# Patient Record
Sex: FEMALE | Race: WHITE | NOT HISPANIC OR LATINO | Employment: FULL TIME | URBAN - METROPOLITAN AREA
[De-identification: names, ages, dates, MRNs, and addresses within clinical notes are randomized per-mention and may not be internally consistent; named-entity substitution may affect disease eponyms.]

---

## 2023-03-26 DIAGNOSIS — M62.838 OTHER MUSCLE SPASM: ICD-10-CM

## 2023-03-27 RX ORDER — CYCLOBENZAPRINE HCL 10 MG
TABLET ORAL
Qty: 90 TABLET | Refills: 3 | Status: SHIPPED | OUTPATIENT
Start: 2023-03-27 | End: 2023-07-29

## 2023-03-28 ENCOUNTER — TELEPHONE (OUTPATIENT)
Dept: PRIMARY CARE | Facility: CLINIC | Age: 63
End: 2023-03-28
Payer: COMMERCIAL

## 2023-03-28 DIAGNOSIS — G47.00 INSOMNIA, UNSPECIFIED TYPE: ICD-10-CM

## 2023-04-03 DIAGNOSIS — M54.81 OCCIPITAL NEURALGIA: ICD-10-CM

## 2023-04-03 RX ORDER — ZOLPIDEM TARTRATE 5 MG/1
5 TABLET ORAL NIGHTLY PRN
COMMUNITY
End: 2023-04-03 | Stop reason: SDUPTHER

## 2023-04-03 RX ORDER — ZOLPIDEM TARTRATE 5 MG/1
5 TABLET ORAL NIGHTLY PRN
Qty: 30 TABLET | Refills: 2 | Status: SHIPPED | OUTPATIENT
Start: 2023-04-03 | End: 2023-07-19 | Stop reason: SDUPTHER

## 2023-04-03 RX ORDER — PREGABALIN 75 MG/1
CAPSULE ORAL
Qty: 90 CAPSULE | OUTPATIENT
Start: 2023-04-03

## 2023-04-03 NOTE — TELEPHONE ENCOUNTER
Per patient, Ambien dose is 5mg daily.  Contract and UDS for sleep aids current.  Refill sent to physician for review.

## 2023-04-10 ENCOUNTER — HOSPITAL ENCOUNTER (OUTPATIENT)
Dept: DATA CONVERSION | Facility: HOSPITAL | Age: 63
End: 2023-04-10
Attending: ANESTHESIOLOGY
Payer: COMMERCIAL

## 2023-04-10 DIAGNOSIS — M54.16 RADICULOPATHY, LUMBAR REGION: ICD-10-CM

## 2023-04-10 DIAGNOSIS — J45.909 UNSPECIFIED ASTHMA, UNCOMPLICATED (HHS-HCC): ICD-10-CM

## 2023-04-17 DIAGNOSIS — J45.40 MODERATE PERSISTENT ASTHMA WITHOUT COMPLICATION (HHS-HCC): Primary | ICD-10-CM

## 2023-04-17 RX ORDER — ALBUTEROL SULFATE 90 UG/1
AEROSOL, METERED RESPIRATORY (INHALATION)
Qty: 17 G | Refills: 4 | Status: SHIPPED | OUTPATIENT
Start: 2023-04-17 | End: 2024-03-18

## 2023-04-19 ENCOUNTER — TELEPHONE (OUTPATIENT)
Dept: PRIMARY CARE | Facility: CLINIC | Age: 63
End: 2023-04-19
Payer: COMMERCIAL

## 2023-04-19 NOTE — TELEPHONE ENCOUNTER
Patient is requesting a refill on Lyrica.  She was referred to pain management in 1/2023 and doesn't have an updated drug screen or urine screen.  She is scheduled for a follow up on 5/31. She is asking for enough to see you at her apt. Please advise

## 2023-05-02 ENCOUNTER — OFFICE VISIT (OUTPATIENT)
Dept: PRIMARY CARE | Facility: CLINIC | Age: 63
End: 2023-05-02
Payer: COMMERCIAL

## 2023-05-02 VITALS
WEIGHT: 142.4 LBS | BODY MASS INDEX: 25.63 KG/M2 | SYSTOLIC BLOOD PRESSURE: 120 MMHG | TEMPERATURE: 97.6 F | DIASTOLIC BLOOD PRESSURE: 72 MMHG

## 2023-05-02 DIAGNOSIS — G44.009 MIGRAINE-CLUSTER HEADACHE SYNDROME: Primary | ICD-10-CM

## 2023-05-02 PROCEDURE — 99213 OFFICE O/P EST LOW 20 MIN: CPT | Performed by: FAMILY MEDICINE

## 2023-05-02 PROCEDURE — 96372 THER/PROPH/DIAG INJ SC/IM: CPT | Performed by: FAMILY MEDICINE

## 2023-05-02 RX ORDER — PAROXETINE 10 MG/1
1 TABLET, FILM COATED ORAL DAILY
COMMUNITY
Start: 2012-04-23 | End: 2023-07-10

## 2023-05-02 RX ORDER — CEVIMELINE HYDROCHLORIDE 30 MG/1
1 CAPSULE ORAL 3 TIMES DAILY
COMMUNITY
Start: 2019-04-26 | End: 2024-01-17 | Stop reason: SDUPTHER

## 2023-05-02 RX ORDER — FLUTICASONE PROPIONATE AND SALMETEROL 500; 50 UG/1; UG/1
1 POWDER RESPIRATORY (INHALATION)
COMMUNITY
Start: 2018-03-05

## 2023-05-02 RX ORDER — OMEPRAZOLE 20 MG/1
20 CAPSULE, DELAYED RELEASE ORAL DAILY
COMMUNITY
Start: 2014-01-27 | End: 2024-01-17 | Stop reason: SDUPTHER

## 2023-05-02 RX ORDER — KETOROLAC TROMETHAMINE 30 MG/ML
60 INJECTION, SOLUTION INTRAMUSCULAR; INTRAVENOUS ONCE
Status: COMPLETED | OUTPATIENT
Start: 2023-05-02 | End: 2023-05-02

## 2023-05-02 RX ORDER — ELETRIPTAN HYDROBROMIDE 40 MG/1
40 TABLET, FILM COATED ORAL ONCE AS NEEDED
Qty: 6 TABLET | Refills: 5 | Status: SHIPPED | OUTPATIENT
Start: 2023-05-02 | End: 2024-03-01 | Stop reason: SDUPTHER

## 2023-05-02 RX ORDER — PREGABALIN 75 MG/1
1 CAPSULE ORAL 3 TIMES DAILY
COMMUNITY
Start: 2020-03-11 | End: 2023-05-02 | Stop reason: SDUPTHER

## 2023-05-02 RX ORDER — PREDNISONE 20 MG/1
40 TABLET ORAL DAILY
Qty: 10 TABLET | Refills: 0 | Status: SHIPPED | OUTPATIENT
Start: 2023-05-02 | End: 2023-05-07

## 2023-05-02 RX ORDER — MONTELUKAST SODIUM 10 MG/1
1 TABLET ORAL EVERY EVENING
COMMUNITY
Start: 2012-04-23 | End: 2023-08-22

## 2023-05-02 RX ORDER — IBUPROFEN 800 MG/1
TABLET ORAL
COMMUNITY
Start: 2018-01-02 | End: 2023-10-17 | Stop reason: SDUPTHER

## 2023-05-02 RX ORDER — LIDOCAINE 50 MG/G
1 PATCH TOPICAL EVERY 12 HOURS
COMMUNITY
End: 2023-10-03

## 2023-05-02 RX ORDER — PREGABALIN 75 MG/1
75 CAPSULE ORAL 3 TIMES DAILY
Qty: 90 CAPSULE | Refills: 5 | Status: SHIPPED | OUTPATIENT
Start: 2023-05-02 | End: 2023-12-21 | Stop reason: HOSPADM

## 2023-05-02 RX ORDER — ELETRIPTAN HYDROBROMIDE 40 MG/1
TABLET, FILM COATED ORAL
COMMUNITY
Start: 2018-11-28 | End: 2023-05-02 | Stop reason: SDUPTHER

## 2023-05-02 RX ADMIN — KETOROLAC TROMETHAMINE 60 MG: 30 INJECTION, SOLUTION INTRAMUSCULAR; INTRAVENOUS at 10:35

## 2023-05-02 ASSESSMENT — ENCOUNTER SYMPTOMS: HEADACHES: 1

## 2023-05-02 ASSESSMENT — PAIN SCALES - WONG BAKER: WONGBAKER_NUMERICALRESPONSE: HURTS WORST

## 2023-05-02 ASSESSMENT — PAIN SCALES - GENERAL: PAINLEVEL_OUTOF10: 10 - WORST POSSIBLE PAIN

## 2023-05-02 NOTE — PROGRESS NOTES
Subjective   Patient ID: Radha Puri is a 62 y.o. female who presents for Headache.    He has been having a nonstop migraine for 1 week.  She thinks that it may be due to her going off the Lyrica.  I told her I would renew it for her and we would do some things to get some relief for this current cluster.    Headache          Review of Systems   Neurological:  Positive for headaches.       Objective   /72   Temp 36.4 °C (97.6 °F) (Skin)   Wt 64.6 kg (142 lb 6.4 oz)   BMI 25.63 kg/m²     Physical Exam  Neurological:      General: No focal deficit present.      Mental Status: She is oriented to person, place, and time. Mental status is at baseline.   Psychiatric:         Mood and Affect: Mood normal.         Behavior: Behavior normal.         Thought Content: Thought content normal.         Judgment: Judgment normal.         Assessment/Plan   Problem List Items Addressed This Visit    None  Visit Diagnoses       Migraine-cluster headache syndrome    -  Primary    Relevant Medications    ketorolac (Toradol) injection 60 mg (Completed) (Start on 5/2/2023 10:45 AM)    eletriptan (Relpax) 40 mg tablet    pregabalin (Lyrica) 75 mg capsule    predniSONE (Deltasone) 20 mg tablet

## 2023-05-02 NOTE — PATIENT INSTRUCTIONS
We gave you a shot of Toradol and I renewed your Lyrica as well as Relpax.  I put you on a short course of prednisone in addition in order to settle this migraine down

## 2023-05-10 ENCOUNTER — APPOINTMENT (OUTPATIENT)
Dept: PRIMARY CARE | Facility: CLINIC | Age: 63
End: 2023-05-10
Payer: COMMERCIAL

## 2023-05-31 ENCOUNTER — APPOINTMENT (OUTPATIENT)
Dept: PRIMARY CARE | Facility: CLINIC | Age: 63
End: 2023-05-31
Payer: COMMERCIAL

## 2023-06-08 ENCOUNTER — TELEPHONE (OUTPATIENT)
Dept: PRIMARY CARE | Facility: CLINIC | Age: 63
End: 2023-06-08
Payer: COMMERCIAL

## 2023-06-08 NOTE — TELEPHONE ENCOUNTER
[facility], left a msg stating that they sent a POC on 5/31, and are looking for you to sigh and fax it back.

## 2023-07-10 DIAGNOSIS — F34.1 PERSISTENT DEPRESSIVE DISORDER: Primary | ICD-10-CM

## 2023-07-10 RX ORDER — PAROXETINE 10 MG/1
TABLET, FILM COATED ORAL
Qty: 90 TABLET | Refills: 3 | Status: SHIPPED | OUTPATIENT
Start: 2023-07-10

## 2023-07-14 LAB
ALANINE AMINOTRANSFERASE (SGPT) (U/L) IN SER/PLAS: 18 U/L (ref 7–45)
ALBUMIN (G/DL) IN SER/PLAS: 4.2 G/DL (ref 3.4–5)
ALKALINE PHOSPHATASE (U/L) IN SER/PLAS: 74 U/L (ref 33–136)
ANION GAP IN SER/PLAS: 13 MMOL/L (ref 10–20)
ASPARTATE AMINOTRANSFERASE (SGOT) (U/L) IN SER/PLAS: 15 U/L (ref 9–39)
BILIRUBIN TOTAL (MG/DL) IN SER/PLAS: 0.7 MG/DL (ref 0–1.2)
CALCIUM (MG/DL) IN SER/PLAS: 8.8 MG/DL (ref 8.6–10.3)
CARBON DIOXIDE, TOTAL (MMOL/L) IN SER/PLAS: 24 MMOL/L (ref 21–32)
CHLORIDE (MMOL/L) IN SER/PLAS: 104 MMOL/L (ref 98–107)
CHOLESTEROL (MG/DL) IN SER/PLAS: 221 MG/DL (ref 0–199)
CHOLESTEROL IN HDL (MG/DL) IN SER/PLAS: 49.7 MG/DL
CHOLESTEROL/HDL RATIO: 4.4
CREATININE (MG/DL) IN SER/PLAS: 1.1 MG/DL (ref 0.5–1.05)
ERYTHROCYTE DISTRIBUTION WIDTH (RATIO) BY AUTOMATED COUNT: 12.8 % (ref 11.5–14.5)
ERYTHROCYTE MEAN CORPUSCULAR HEMOGLOBIN CONCENTRATION (G/DL) BY AUTOMATED: 32 G/DL (ref 32–36)
ERYTHROCYTE MEAN CORPUSCULAR VOLUME (FL) BY AUTOMATED COUNT: 96 FL (ref 80–100)
ERYTHROCYTES (10*6/UL) IN BLOOD BY AUTOMATED COUNT: 4.2 X10E12/L (ref 4–5.2)
GFR FEMALE: 56 ML/MIN/1.73M2
GLUCOSE (MG/DL) IN SER/PLAS: 161 MG/DL (ref 74–99)
HEMATOCRIT (%) IN BLOOD BY AUTOMATED COUNT: 40.3 % (ref 36–46)
HEMOGLOBIN (G/DL) IN BLOOD: 12.9 G/DL (ref 12–16)
LDL: 144 MG/DL (ref 0–99)
LEUKOCYTES (10*3/UL) IN BLOOD BY AUTOMATED COUNT: 5.1 X10E9/L (ref 4.4–11.3)
PLATELETS (10*3/UL) IN BLOOD AUTOMATED COUNT: 297 X10E9/L (ref 150–450)
POTASSIUM (MMOL/L) IN SER/PLAS: 4.5 MMOL/L (ref 3.5–5.3)
PROTEIN TOTAL: 6.7 G/DL (ref 6.4–8.2)
SODIUM (MMOL/L) IN SER/PLAS: 136 MMOL/L (ref 136–145)
TRIGLYCERIDE (MG/DL) IN SER/PLAS: 136 MG/DL (ref 0–149)
UREA NITROGEN (MG/DL) IN SER/PLAS: 29 MG/DL (ref 6–23)
VLDL: 27 MG/DL (ref 0–40)

## 2023-07-19 ENCOUNTER — OFFICE VISIT (OUTPATIENT)
Dept: PRIMARY CARE | Facility: CLINIC | Age: 63
End: 2023-07-19
Payer: COMMERCIAL

## 2023-07-19 VITALS
SYSTOLIC BLOOD PRESSURE: 120 MMHG | DIASTOLIC BLOOD PRESSURE: 80 MMHG | TEMPERATURE: 97.9 F | WEIGHT: 145.2 LBS | OXYGEN SATURATION: 95 % | HEART RATE: 81 BPM | BODY MASS INDEX: 26.13 KG/M2

## 2023-07-19 DIAGNOSIS — Z00.00 HEALTH MAINTENANCE EXAMINATION: Primary | ICD-10-CM

## 2023-07-19 DIAGNOSIS — Z79.899 MEDICATION MANAGEMENT: ICD-10-CM

## 2023-07-19 DIAGNOSIS — Z12.11 COLON CANCER SCREENING: ICD-10-CM

## 2023-07-19 DIAGNOSIS — G47.00 INSOMNIA, UNSPECIFIED TYPE: ICD-10-CM

## 2023-07-19 DIAGNOSIS — Z23 ENCOUNTER FOR IMMUNIZATION: ICD-10-CM

## 2023-07-19 DIAGNOSIS — R73.9 HYPERGLYCEMIA: ICD-10-CM

## 2023-07-19 LAB — POC HEMOGLOBIN A1C: 5.9 % (ref 4.2–6.5)

## 2023-07-19 PROCEDURE — 90471 IMMUNIZATION ADMIN: CPT | Performed by: STUDENT IN AN ORGANIZED HEALTH CARE EDUCATION/TRAINING PROGRAM

## 2023-07-19 PROCEDURE — 80346 BENZODIAZEPINES1-12: CPT

## 2023-07-19 PROCEDURE — 80354 DRUG SCREENING FENTANYL: CPT

## 2023-07-19 PROCEDURE — 80373 DRUG SCREENING TRAMADOL: CPT

## 2023-07-19 PROCEDURE — 80361 OPIATES 1 OR MORE: CPT

## 2023-07-19 PROCEDURE — 80307 DRUG TEST PRSMV CHEM ANLYZR: CPT

## 2023-07-19 PROCEDURE — 99396 PREV VISIT EST AGE 40-64: CPT | Performed by: STUDENT IN AN ORGANIZED HEALTH CARE EDUCATION/TRAINING PROGRAM

## 2023-07-19 PROCEDURE — 80358 DRUG SCREENING METHADONE: CPT

## 2023-07-19 PROCEDURE — 99214 OFFICE O/P EST MOD 30 MIN: CPT | Performed by: STUDENT IN AN ORGANIZED HEALTH CARE EDUCATION/TRAINING PROGRAM

## 2023-07-19 PROCEDURE — 90715 TDAP VACCINE 7 YRS/> IM: CPT | Performed by: STUDENT IN AN ORGANIZED HEALTH CARE EDUCATION/TRAINING PROGRAM

## 2023-07-19 PROCEDURE — 80365 DRUG SCREENING OXYCODONE: CPT

## 2023-07-19 PROCEDURE — 1036F TOBACCO NON-USER: CPT | Performed by: STUDENT IN AN ORGANIZED HEALTH CARE EDUCATION/TRAINING PROGRAM

## 2023-07-19 PROCEDURE — 80368 SEDATIVE HYPNOTICS: CPT

## 2023-07-19 PROCEDURE — 83036 HEMOGLOBIN GLYCOSYLATED A1C: CPT | Performed by: STUDENT IN AN ORGANIZED HEALTH CARE EDUCATION/TRAINING PROGRAM

## 2023-07-19 RX ORDER — ALBUTEROL SULFATE 0.83 MG/ML
2.5 SOLUTION RESPIRATORY (INHALATION) EVERY 4 HOURS PRN
COMMUNITY
Start: 2013-11-07

## 2023-07-19 RX ORDER — ZOLPIDEM TARTRATE 5 MG/1
5 TABLET ORAL NIGHTLY PRN
Qty: 30 TABLET | Refills: 2 | Status: SHIPPED | OUTPATIENT
Start: 2023-07-19 | End: 2023-10-24 | Stop reason: SDUPTHER

## 2023-07-19 ASSESSMENT — ENCOUNTER SYMPTOMS
SHORTNESS OF BREATH: 0
ABDOMINAL PAIN: 0
PALPITATIONS: 0
NERVOUS/ANXIOUS: 0
HEMATURIA: 0
DIZZINESS: 0
FATIGUE: 0
HEADACHES: 0
DYSURIA: 0
DYSPHORIC MOOD: 0
FREQUENCY: 0
NAUSEA: 0
WHEEZING: 0
CHILLS: 0
COUGH: 0
NUMBNESS: 0
CONSTIPATION: 0
FEVER: 0
DIARRHEA: 0

## 2023-07-19 ASSESSMENT — PATIENT HEALTH QUESTIONNAIRE - PHQ9
SUM OF ALL RESPONSES TO PHQ9 QUESTIONS 1 AND 2: 0
1. LITTLE INTEREST OR PLEASURE IN DOING THINGS: NOT AT ALL
2. FEELING DOWN, DEPRESSED OR HOPELESS: NOT AT ALL

## 2023-07-19 NOTE — PATIENT INSTRUCTIONS
Recommendations for women annual wellness exam:   Make sure screenings for cervical, breast, and colon cancer are up to date if applicable- pap smears age 21-65, discuss mammogram starting at age 40, colonoscopy at age 45, earlier if positive family history for breast or colon cancer   Screen for osteoporosis with DXA bone scan starting at age 65 or sooner if risk factors present (long term steroid use, smoking, heavy alcohol use, history of fracture, rheumatoid arthritis, low body weight, family history of hip fracture)  Screening for lung cancer with low-dose CT in all adults age 55-77 who have a 30 pack-year smoking history and currently smoke or have quit within the past 15 years  Follow a healthy diet (Dash diet, Mediterranean diet)  Exercise 150 min/wk   Maintain healthy weight (BMI < 25)   Do not smoke   Alcohol in moderation (up to 1 drink/day)  Get enough sleep (7-8 hours/night)  Make sure immunizations are up to date (influenza, pneumococcal, Tdap, shingles if age > 50)  Postmenopausal women or women with osteoporosis need minimum 1,200 mg calcium and 800 IU vitamin D daily  Talk to your physician if you have concerns about depression or anxiety  Visit dentist twice yearly    Try to keep an eye on carbohydrate consumption  Tdap given today  Meds refilled  Will f/up in 6 months for med refill

## 2023-07-19 NOTE — PROGRESS NOTES
Radha Puri  presents for her annual wellness exam.    HPI  Specialists seen by patient: Ortho spine: Dr Parker  -ENT  -GYN: Dr Rincon  -Neuro: Dr Becker  -eye doctor  -dentist    Last pap/cervical cancer screening: within last year  Last mammogram: approximate date 6/22 and was normal, that was diagnostic  Hx of colon ca screening: yes, 2019, needs repeat  Hx of DXA: n/a  History of depression or anxiety:yes, doing well  Immunizations: not up to date - may need tdap and will get tdap  Diet: Follows a healthy diet  Exercise: Gets regular exercise,  and swims   Alcohol use:  a few drinks a week    How many times in the past year 4 or more drinks in a day? Not sure  Lung cancer screening: not applicable  Smoking history: ex-smoker  Drug use: no    Needs refill of ambien. Takes it nightly. Has tried melatonin. Hasn't tried trazodone or amitriptyline.     OARRS:  Elizabeth Donahue,  on 7/19/2023  8:47 AM  I have personally reviewed the OARRS report for Radha Puri. I have considered the risks of abuse, dependence, addiction and diversion    Is the patient prescribed a combination of a benzodiazepine and opioid?  No    Last Urine Drug Screen / ordered today: Yes  Recent Results (from the past 62530 hour(s))   OPIATE/OPIOID/BENZO PRESCRIPTION COMPLIANCE    Collection Time: 07/19/23  8:08 AM   Result Value Ref Range    DRUG SCREEN COMMENT URINE SEE BELOW     Creatine, Urine 122.7 mg/dL    Amphetamine Screen, Urine PRESUMPTIVE NEGATIVE NEGATIVE    Barbiturate Screen, Urine PRESUMPTIVE NEGATIVE NEGATIVE    Cannabinoid Screen, Urine PRESUMPTIVE NEGATIVE NEGATIVE    Cocaine Screen, Urine PRESUMPTIVE NEGATIVE NEGATIVE    PCP Screen, Urine PRESUMPTIVE NEGATIVE NEGATIVE    7-Aminoclonazepam <25 Cutoff <25 ng/mL    Alpha-Hydroxyalprazolam <25 Cutoff <25 ng/mL    Alpha-Hydroxymidazolam <25 Cutoff <25 ng/mL    Alprazolam <25 Cutoff <25 ng/mL    Chlordiazepoxide <25 Cutoff <25 ng/mL    Clonazepam <25 Cutoff <25  ng/mL    Diazepam <25 Cutoff <25 ng/mL    Lorazepam <25 Cutoff <25 ng/mL    Midazolam <25 Cutoff <25 ng/mL    Nordiazepam <25 Cutoff <25 ng/mL    Oxazepam <25 Cutoff <25 ng/mL    Temazepam <25 Cutoff <25 ng/mL    Zolpidem 25 (A) Cutoff <25 ng/mL    Zolpidem Metabolite (ZCA) >1000 (A) Cutoff <25 ng/mL    6-Acetylmorphine <25 Cutoff <25 ng/mL    Codeine <50 Cutoff <50 ng/mL    Hydrocodone <25 Cutoff <25 ng/mL    Hydromorphone <25 Cutoff <25 ng/mL    Morphine Urine <50 Cutoff <50 ng/mL    Norhydrocodone <25 Cutoff <25 ng/mL    Noroxycodone <25 Cutoff <25 ng/mL    Oxycodone <25 Cutoff <25 ng/mL    Oxymorphone <25 Cutoff <25 ng/mL    Tramadol <50 Cutoff <50 ng/mL    O-Desmethyltramadol <50 Cutoff <50 ng/mL    Fentanyl <2.5 Cutoff<2.5 ng/mL    Norfentanyl <2.5 Cutoff<2.5 ng/mL    METHADONE CONFIRMATION,URINE <25 Cutoff <25 ng/mL    EDDP <25 Cutoff <25 ng/mL   Drug Screen, Urine With Reflex to Confirmation    Collection Time: 06/08/22 12:11 PM   Result Value Ref Range    DRUG SCREEN COMMENT URINE SEE BELOW     Amphetamine Screen, Urine PRESUMPTIVE NEGATIVE NEGATIVE    Barbiturate Screen, Urine PRESUMPTIVE NEGATIVE NEGATIVE    BENZODIAZEPINE (PRESENCE) IN URINE BY SCREEN METHOD PRESUMPTIVE NEGATIVE NEGATIVE    Cannabinoid Screen, Urine PRESUMPTIVE NEGATIVE NEGATIVE    Cocaine Screen, Urine PRESUMPTIVE NEGATIVE NEGATIVE    Fentanyl, Ur PRESUMPTIVE NEGATIVE NEGATIVE    Methadone Screen, Urine PRESUMPTIVE NEGATIVE NEGATIVE    Opiate Screen, Urine PRESUMPTIVE NEGATIVE NEGATIVE    Oxycodone Screen, Ur PRESUMPTIVE NEGATIVE NEGATIVE    PCP Screen, Urine PRESUMPTIVE NEGATIVE NEGATIVE   Benzodiazepine Confirmation, Urine    Collection Time: 06/08/22 12:11 PM   Result Value Ref Range    7-Aminoclonazepam <25 Cutoff <25 ng/mL    Alpha-Hydroxyalprazolam <25 Cutoff <25 ng/mL    Alpha-Hydroxymidazolam <25 Cutoff <25 ng/mL    Alprazolam <25 Cutoff <25 ng/mL    Chlordiazepoxide <25 Cutoff <25 ng/mL    Clonazepam <25 Cutoff <25 ng/mL     Diazepam <25 Cutoff <25 ng/mL    Lorazepam <25 Cutoff <25 ng/mL    Midazolam <25 Cutoff <25 ng/mL    Nordiazepam <25 Cutoff <25 ng/mL    Oxazepam <25 Cutoff <25 ng/mL    Temazepam <25 Cutoff <25 ng/mL   Amphetamine Confirm, Urine    Collection Time: 08/02/21 12:00 AM   Result Value Ref Range    Amphetamines,Urine <50 ng/mL    MDA, Urine <200 ng/mL    MDEA, Urine <200 ng/mL    MDMA, Urine <200 ng/mL    Methamphetamine Quant, Ur <200 ng/mL    Phentermine,Urine <200 ng/mL     Results are as expected.     Controlled Substance Agreement:  Date of the Last Agreement: 7/19/23  Reviewed Controlled Substance Agreement including but not limited to the benefits, risks, and alternatives to treatment with a Controlled Substance medication(s).    Sleep Aids:   What is the patient's goal of therapy? Improve sleep  Is this being achieved with current treatment? yes    Activities of Daily Living:   Is your overall impression that this patient is benefiting (symptom reduction outweighs side effects) from sleep aid therapy? Yes     1. Physical Functioning: Better  2. Family Relationship: Same  3. Social Relationship: Same  4. Mood: Same  5. Sleep Patterns: Better  6. Overall Function: Better      Review of Systems   Constitutional:  Negative for chills, fatigue and fever.   Respiratory:  Negative for cough, shortness of breath and wheezing.    Cardiovascular:  Negative for chest pain, palpitations and leg swelling.   Gastrointestinal:  Negative for abdominal pain, constipation, diarrhea and nausea.   Genitourinary:  Negative for dysuria, frequency, hematuria and urgency.   Neurological:  Negative for dizziness, numbness and headaches.   Psychiatric/Behavioral:  Negative for dysphoric mood. The patient is not nervous/anxious.           Objective    /80 (BP Location: Left arm, Patient Position: Sitting, BP Cuff Size: Adult)   Pulse 81   Temp 36.6 °C (97.9 °F) (Temporal)   Wt 65.9 kg (145 lb 3.2 oz)   SpO2 95%   BMI 26.13  kg/m²     Physical Exam       Problem List Items Addressed This Visit    None  Visit Diagnoses       Health maintenance examination    -  Primary    Insomnia, unspecified type        Relevant Medications    zolpidem (Ambien) 5 mg tablet    Other Relevant Orders    Opiate/Opioid/Benzo Extended Prescription Compliance (Completed)    Encounter for immunization        Relevant Orders    Tdap vaccine, age 10 years and older (BOOSTRIX) (Completed)    Colon cancer screening        Hyperglycemia        Relevant Orders    POCT glycosylated hemoglobin (Hb A1C) manually resulted (Completed)    Medication management        Relevant Orders    Opiate/Opioid/Benzo Extended Prescription Compliance (Completed)             Blood work results reviewed with patient.   We reviewed appropriate preventative health screening guidelines. The patient is not up-to-date on vaccinations, recommended tetanus vaccine and given today . The patient is  up to date on mammogram but declines one for this year. The patient had colonoscopy in 2019.    We discussed regular aerobic exercise. We discussed proper nutrition and weight control.      Elizabeth Donahue, DO  07/22/23

## 2023-07-21 LAB
6-ACETYLMORPHINE: <25 NG/ML
7-AMINOCLONAZEPAM: <25 NG/ML
ALPHA-HYDROXYALPRAZOLAM: <25 NG/ML
ALPHA-HYDROXYMIDAZOLAM: <25 NG/ML
ALPRAZOLAM: <25 NG/ML
AMPHETAMINE (PRESENCE) IN URINE BY SCREEN METHOD: ABNORMAL
BARBITURATES PRESENCE IN URINE BY SCREEN METHOD: ABNORMAL
CANNABINOIDS IN URINE BY SCREEN METHOD: ABNORMAL
CHLORDIAZEPOXIDE: <25 NG/ML
CLONAZEPAM: <25 NG/ML
COCAINE (PRESENCE) IN URINE BY SCREEN METHOD: ABNORMAL
CODEINE: <50 NG/ML
CREATINE, URINE FOR DRUG: 122.7 MG/DL
DIAZEPAM: <25 NG/ML
DRUG SCREEN COMMENT URINE: ABNORMAL
EDDP: <25 NG/ML
FENTANYL CONFIRMATION, URINE: <2.5 NG/ML
HYDROCODONE: <25 NG/ML
HYDROMORPHONE: <25 NG/ML
LORAZEPAM: <25 NG/ML
METHADONE CONFIRMATION,URINE: <25 NG/ML
MIDAZOLAM: <25 NG/ML
MORPHINE URINE: <50 NG/ML
NORDIAZEPAM: <25 NG/ML
NORFENTANYL: <2.5 NG/ML
NORHYDROCODONE: <25 NG/ML
NOROXYCODONE: <25 NG/ML
O-DESMETHYLTRAMADOL: <50 NG/ML
OXAZEPAM: <25 NG/ML
OXYCODONE: <25 NG/ML
OXYMORPHONE: <25 NG/ML
PHENCYCLIDINE (PRESENCE) IN URINE BY SCREEN METHOD: ABNORMAL
TEMAZEPAM: <25 NG/ML
TRAMADOL: <50 NG/ML
ZOLPIDEM METABOLITE (ZCA): >1000 NG/ML
ZOLPIDEM: 25 NG/ML

## 2023-07-22 PROBLEM — R73.9 HYPERGLYCEMIA: Status: ACTIVE | Noted: 2023-07-22

## 2023-07-22 PROBLEM — G47.00 INSOMNIA: Status: ACTIVE | Noted: 2023-07-22

## 2023-07-22 NOTE — ASSESSMENT & PLAN NOTE
Patient's blood sugar was quite high on recent labs.  A1c in office was 5.9 and we discussed the importance of a low carbohydrate diet with prediabetes.

## 2023-07-22 NOTE — ASSESSMENT & PLAN NOTE
Ambien refilled.  She is aware of the risks of being on this medication and at this time I feel that it is okay that she continue with this medication.  I personally have reviewed the OARRS report for this patient.  This report is scanned into the electronic medical record.  I have considered the risks of abuse, dependence, addiction and diversion.  I believe that it is clinically appropriate for the patient to be prescribed this medication.  Urine drug screen and drug contract either pending or up-to-date.

## 2023-07-24 DIAGNOSIS — M62.838 OTHER MUSCLE SPASM: ICD-10-CM

## 2023-07-29 RX ORDER — CYCLOBENZAPRINE HCL 10 MG
10 TABLET ORAL 3 TIMES DAILY PRN
Qty: 90 TABLET | Refills: 0 | Status: SHIPPED | OUTPATIENT
Start: 2023-07-29 | End: 2023-08-16

## 2023-08-11 ENCOUNTER — TELEPHONE (OUTPATIENT)
Dept: PRIMARY CARE | Facility: CLINIC | Age: 63
End: 2023-08-11
Payer: COMMERCIAL

## 2023-08-11 DIAGNOSIS — M54.42 ACUTE LOW BACK PAIN WITH LEFT-SIDED SCIATICA, UNSPECIFIED BACK PAIN LATERALITY: ICD-10-CM

## 2023-08-11 NOTE — TELEPHONE ENCOUNTER
Lauren from [facility] needs a new order for dry needling pt.  Patient has apt today for reevaluation.  Please fax to [phone]

## 2023-08-12 DIAGNOSIS — M62.838 OTHER MUSCLE SPASM: ICD-10-CM

## 2023-08-15 NOTE — TELEPHONE ENCOUNTER
"----- Message from Elizabeth Donahue DO sent at 8/15/2023  3:37 PM EDT -----  Regarding: FW: Dry needling   Contact: [phone]  Can you put in the prescription under physical therapy with comments dry needling    - Dr. DUNBAR    ----- Message -----  From: Leanne Davis  Sent: 8/14/2023   4:55 PM EDT  To: Elizabeth Donahue DO  Subject: FW: Dry needling                                   ----- Message -----  From: Radha Puri \"Hilda\"  Sent: 8/9/2023  11:20 AM EDT  To:  [place] [id number] Clinical Support Staff  Subject: Dry needling                                     Hi, I was DC from Physical Therapy last night. Sadly! The back and hip issues are here to stay:/  Dry needling has helped considerably. However because I was discharged, I’ll need a new script for dry needling. Would you please right a script to [facility] for dry needling. Their fax number is [phone]  Thank you   Hilda Puri   [phone]      "

## 2023-08-16 RX ORDER — CYCLOBENZAPRINE HCL 10 MG
10 TABLET ORAL 3 TIMES DAILY PRN
Qty: 90 TABLET | Refills: 0 | Status: SHIPPED | OUTPATIENT
Start: 2023-08-16 | End: 2023-09-06

## 2023-08-22 DIAGNOSIS — T78.40XD ALLERGY, SUBSEQUENT ENCOUNTER: Primary | ICD-10-CM

## 2023-08-22 RX ORDER — MONTELUKAST SODIUM 10 MG/1
10 TABLET ORAL EVERY EVENING
Qty: 90 TABLET | Refills: 3 | Status: SHIPPED | OUTPATIENT
Start: 2023-08-22

## 2023-09-04 DIAGNOSIS — M62.838 OTHER MUSCLE SPASM: ICD-10-CM

## 2023-09-06 RX ORDER — CYCLOBENZAPRINE HCL 10 MG
10 TABLET ORAL 3 TIMES DAILY PRN
Qty: 90 TABLET | Refills: 0 | Status: SHIPPED | OUTPATIENT
Start: 2023-09-06 | End: 2023-12-21 | Stop reason: HOSPADM

## 2023-09-15 ENCOUNTER — OFFICE VISIT (OUTPATIENT)
Dept: PRIMARY CARE | Facility: CLINIC | Age: 63
End: 2023-09-15
Payer: COMMERCIAL

## 2023-09-15 VITALS
SYSTOLIC BLOOD PRESSURE: 112 MMHG | DIASTOLIC BLOOD PRESSURE: 69 MMHG | WEIGHT: 144.6 LBS | HEART RATE: 88 BPM | TEMPERATURE: 97.6 F | OXYGEN SATURATION: 95 % | BODY MASS INDEX: 26.03 KG/M2

## 2023-09-15 DIAGNOSIS — M25.562 ACUTE PAIN OF LEFT KNEE: Primary | ICD-10-CM

## 2023-09-15 DIAGNOSIS — R79.89 ELEVATED SERUM CREATININE: ICD-10-CM

## 2023-09-15 PROCEDURE — 1036F TOBACCO NON-USER: CPT | Performed by: NURSE PRACTITIONER

## 2023-09-15 PROCEDURE — 99213 OFFICE O/P EST LOW 20 MIN: CPT | Performed by: NURSE PRACTITIONER

## 2023-09-15 RX ORDER — DICLOFENAC SODIUM 10 MG/G
GEL TOPICAL
Qty: 200 G | Refills: 1 | Status: SHIPPED | OUTPATIENT
Start: 2023-09-15 | End: 2023-09-15 | Stop reason: SDUPTHER

## 2023-09-15 RX ORDER — NAPROXEN SODIUM 220 MG
TABLET ORAL EVERY 8 HOURS PRN
COMMUNITY
End: 2023-10-02

## 2023-09-15 RX ORDER — DICLOFENAC SODIUM 10 MG/G
GEL TOPICAL
Qty: 200 G | Refills: 1 | Status: SHIPPED | OUTPATIENT
Start: 2023-09-15 | End: 2023-09-15 | Stop reason: ALTCHOICE

## 2023-09-15 RX ORDER — DICLOFENAC SODIUM 10 MG/G
GEL TOPICAL
Qty: 200 G | Refills: 1 | Status: SHIPPED | OUTPATIENT
Start: 2023-09-15 | End: 2023-10-17 | Stop reason: ALTCHOICE

## 2023-09-15 RX ORDER — FLURANDRENOLIDE 4 UG/CM2
TAPE TOPICAL DAILY PRN
COMMUNITY
Start: 2019-05-24

## 2023-09-15 ASSESSMENT — ENCOUNTER SYMPTOMS
ARTHRALGIAS: 1
FEVER: 0
CHILLS: 0
FATIGUE: 0
COUGH: 1
NECK PAIN: 1

## 2023-09-15 NOTE — PROGRESS NOTES
Subjective   Hilda Puri is a 62 y.o. female who presents for Knee Pain (Possible referral for new orthapedic.), Flu Vaccine (Already recieved), Cough (Thinks it is Asthma related), and Med Refill (Patti, diclofenic gel,  mg,).    HPI  She presents to the office today for evaluation of her left knee.  About one month ago fell on the left knee. No twisting- went down directly on the left knee.  (+) Bruising, swelling and redness.  Went to urgent care last Sunday and had an x-ray.  No new fracture but mild OA and loose bodies noted.  She reports it is much more painful bearing weight now than it was after the fall.    Had a fall 2 years ago and fractured the kneecap.  She reports she had an orthopedic at [facility] who retired.  She needs a new referral to orthopedics.    She also report she has had an ongoing dry cough. This started over the summer with the poor air quality but has persisted.  Was on steroid in the past but made her very irritable.    Review of Systems   Constitutional:  Negative for chills, fatigue and fever.   Respiratory:  Positive for cough.    Cardiovascular:  Negative for leg swelling.   Musculoskeletal:  Positive for arthralgias, gait problem and neck pain.       Objective   /69   Pulse 88   Temp 36.4 °C (97.6 °F)   Wt 65.6 kg (144 lb 9.6 oz)   SpO2 95%   BMI 26.03 kg/m²     Physical Exam  Constitutional:       General: She is not in acute distress.     Appearance: Normal appearance. She is not toxic-appearing.   Cardiovascular:      Rate and Rhythm: Normal rate and regular rhythm.      Pulses: Normal pulses.      Heart sounds: Normal heart sounds, S1 normal and S2 normal.   Pulmonary:      Effort: Pulmonary effort is normal. No accessory muscle usage or respiratory distress.      Breath sounds: Normal breath sounds and air entry. No wheezing or rhonchi.   Musculoskeletal:      Right knee: Normal.      Left knee: Crepitus present. Decreased range of motion.      Right  lower leg: No edema.      Left lower leg: No edema.   Lymphadenopathy:      Cervical: No cervical adenopathy.   Neurological:      Mental Status: She is alert and oriented to person, place, and time.      Sensory: Sensation is intact.   Psychiatric:         Mood and Affect: Mood normal.         Behavior: Behavior normal.         Thought Content: Thought content normal.         Judgment: Judgment normal.       Assessment/Plan   Problem List Items Addressed This Visit       Elevated serum creatinine - Primary     Advised to avoid NSAIDs for now until rechecked.         Relevant Orders    Basic Metabolic Panel    Acute pain of left knee     Diclofenac gel as needed. Referral to orthopedics Dr. Leonard Sal.         Relevant Medications    diclofenac sodium (Voltaren) 1 % gel gel    Other Relevant Orders    Referral to Orthopaedic Surgery       It has been a pleasure seeing you today!

## 2023-09-28 DIAGNOSIS — Z87.39 PERSONAL HISTORY OF OTHER DISEASES OF THE MUSCULOSKELETAL SYSTEM AND CONNECTIVE TISSUE: ICD-10-CM

## 2023-09-29 NOTE — TELEPHONE ENCOUNTER
Patient informed Dr MISTRY is out of office until 10/2/23. Patient states medication is not needed now and can wait until when  returns.

## 2023-10-02 ENCOUNTER — OFFICE VISIT (OUTPATIENT)
Dept: PRIMARY CARE | Facility: CLINIC | Age: 63
End: 2023-10-02
Payer: COMMERCIAL

## 2023-10-02 VITALS
HEART RATE: 78 BPM | TEMPERATURE: 97.2 F | BODY MASS INDEX: 24.37 KG/M2 | SYSTOLIC BLOOD PRESSURE: 112 MMHG | DIASTOLIC BLOOD PRESSURE: 72 MMHG | OXYGEN SATURATION: 97 % | WEIGHT: 135.4 LBS

## 2023-10-02 DIAGNOSIS — M54.31 SCIATICA OF RIGHT SIDE: Primary | ICD-10-CM

## 2023-10-02 PROCEDURE — 99213 OFFICE O/P EST LOW 20 MIN: CPT | Performed by: INTERNAL MEDICINE

## 2023-10-02 PROCEDURE — 1036F TOBACCO NON-USER: CPT | Performed by: INTERNAL MEDICINE

## 2023-10-02 RX ORDER — DICLOFENAC SODIUM 10 MG/G
4 GEL TOPICAL 4 TIMES DAILY
Qty: 150 G | Refills: 1 | Status: SHIPPED | OUTPATIENT
Start: 2023-10-02

## 2023-10-02 RX ORDER — IBUPROFEN 800 MG/1
800 TABLET ORAL 3 TIMES DAILY PRN
Qty: 90 TABLET | Refills: 5 | Status: SHIPPED | OUTPATIENT
Start: 2023-10-02 | End: 2024-02-28

## 2023-10-02 NOTE — OP NOTE
Post Operative Note:     Post-Procedure Diagnosis: Right-sided lumbar radiculitis   Procedure: 1.   2.   3.   4.   5.   Surgeon: Monroe   Resident/Fellow/Other Assistant: KATHERINE Oliveira   Estimated Blood Loss (mL): none   Specimen: no   Findings: None     Operative Report Dictated:  Dictation: not applicable - note contains Operative  Report   Operative Report:    Preoperative diagnosis:  Lumbar radiculitis  Postoperative diagnosis:  Lumbar radiculitis, spondylosis, stenosis  Procedure: Right-sided L3 and 4 lumbar transforaminal epidural steroid injection under fluoroscopic guidance  Surgeon: Sivan Childress  Assistant:  Fellow  Anesthesia: Local  Complications: Apparently none    Clinical note: Ms. Puri is a white female with history of low back and right-sided leg pain.  We discussed moving forward with the aforementioned procedure and she is here today.  CT lumbar spine  reviewed.    Procedure note: The patient was met in the preoperative holding area after risks benefits and alternatives to procedure were discussed with the patient, informed consent was obtained. Patient brought back to the procedure room and placed in the prone  position on the fluoroscopy table. Area over the back was exposed, prepped, draped, in the usual sterile fashion.  Skin and subcutaneous tissues to the neuroforamen was anesthetized using 0.5% lidocaine.  22-gauge Sprotte needles were inserted in the  skin and advanced into the foramen. Needle tip position was confirmed in AP oblique and lateral view.  Contrast was injected which showed appropriate epidural spread, no intravascular or intrathecal uptake. A total of 2 mL of 0.5% lidocaine mixed with  10 mg dexamethasone was injected in divided doses among the 2 needles. Needles removed, bandage applied, patient tolerated the procedure well with no immediate complications.      Attestation:   Note Completion:  Attending Attestation I was present for the entire procedure          Electronic Signatures:  Sivan Childress)  (Signed 10-Apr-2023 09:12)   Authored: Post Operative Note, Note Completion      Last Updated: 10-Apr-2023 09:12 by Sivan Childress)

## 2023-10-02 NOTE — PROGRESS NOTES
Subjective   Patient ID: [id number]   [facility]    Hilda Puri is a 62 y.o. female who presents for sciatic pain. She is having sciatica pain chronically. She is referred to orthopedic doctor this month but she wants physical therapy.        Objective   Visit Vitals  /72 (BP Location: Left arm, Patient Position: Sitting, BP Cuff Size: Adult)   Pulse 78   Temp 36.2 °C (97.2 °F) (Skin)      Review of Systems  All 12 systems reviewed, no other abnormality except that mentioned in HPI.    Physical Exam  Constitutional- no abnormality  ENT- no abnormality  Neck- no swelling  CVS- normal S1 and S2, no murmur.  Pulmonary- clear to auscultation,no rhonchi, no wheezes.  Abdomen- normal- liver and spleen, soft, no distension, bowel sound present.  Neurological- all cranial nerves intact, speech and gait normal, no sensory or motor deficiency.  Musculoskeletal- all pulses are normal, normal movement, no joint swelling.  Skin- no rash, dry.  psychiatry- no suicidal ideation.  Lymph node- no lymphadenopathy      Assessment/Plan   Problem List Items Addressed This Visit    None  Visit Diagnoses       Sciatica of right side    -  Primary    Relevant Medications    diclofenac sodium (Voltaren) 1 % gel gel    ibuprofen 800 mg tablet    Other Relevant Orders    Referral to Physical Therapy            Ruth Barajas MD

## 2023-10-03 RX ORDER — LIDOCAINE 50 MG/G
PATCH TOPICAL
Qty: 30 PATCH | Refills: 5 | Status: SHIPPED | OUTPATIENT
Start: 2023-10-03

## 2023-10-17 ENCOUNTER — OFFICE VISIT (OUTPATIENT)
Dept: ORTHOPEDIC SURGERY | Facility: CLINIC | Age: 63
End: 2023-10-17
Payer: COMMERCIAL

## 2023-10-17 ENCOUNTER — PREP FOR PROCEDURE (OUTPATIENT)
Dept: ORTHOPEDIC SURGERY | Facility: CLINIC | Age: 63
End: 2023-10-17

## 2023-10-17 ENCOUNTER — ANCILLARY PROCEDURE (OUTPATIENT)
Dept: RADIOLOGY | Facility: CLINIC | Age: 63
End: 2023-10-17
Payer: COMMERCIAL

## 2023-10-17 VITALS — BODY MASS INDEX: 23.92 KG/M2 | WEIGHT: 135 LBS | HEIGHT: 63 IN

## 2023-10-17 DIAGNOSIS — M25.351 INSTABILITY OF RIGHT HIP JOINT: ICD-10-CM

## 2023-10-17 DIAGNOSIS — M17.11 ARTHRITIS OF RIGHT KNEE: ICD-10-CM

## 2023-10-17 DIAGNOSIS — M25.551 RIGHT HIP PAIN: ICD-10-CM

## 2023-10-17 DIAGNOSIS — M16.11 PRIMARY OSTEOARTHRITIS OF RIGHT HIP: ICD-10-CM

## 2023-10-17 PROCEDURE — 1036F TOBACCO NON-USER: CPT | Performed by: ORTHOPAEDIC SURGERY

## 2023-10-17 PROCEDURE — 99214 OFFICE O/P EST MOD 30 MIN: CPT | Performed by: ORTHOPAEDIC SURGERY

## 2023-10-17 PROCEDURE — 73502 X-RAY EXAM HIP UNI 2-3 VIEWS: CPT | Mod: RIGHT SIDE | Performed by: RADIOLOGY

## 2023-10-17 PROCEDURE — 73502 X-RAY EXAM HIP UNI 2-3 VIEWS: CPT | Mod: RT,FY

## 2023-10-17 NOTE — PROGRESS NOTES
PRIMARY CARE PHYSICIAN: Elizabeth Donahue DO  REFERRING PROVIDER: Dr Leonard Sal MD    CONSULT ORTHOPAEDIC: Hip Evaluation        ASSESSMENT & PLAN    IMPRESSION:  Primary osteoarthritis, right hip    PLAN:  Radha Puri has radiographic and physical exam evidence of degenerative joint disease and wishes to pursue surgery. The patient appears to have sufficient symptoms to warrant surgical intervention and is an appropriate candidate for  right Total Hip Arthroplasty as evidenced by six months of unsuccessful non-operative treatment as outlined in the HPI, progressive symptoms including pain impacting sleep or causing fatigue, pain impacting work, pain worsened with weight bearing, and pain limiting ability to stay fit and healthy.     We had a lengthy discussion regarding the risk and benefit of surgery, the alternatives, limitations, and personnel involved. The include but are not limited to infection, persistent pain, instability, nerve injury, blood clots, and medical complications. We also discussed the pre-operative course, surgery itself, and rehabilitation. Perioperative blood management and transfusion issues were discussed, and options clearly outlined. The patient consented to the use of allogenic blood if medically necessary.     The patient has elected to schedule surgery at this time or intents to call the office with a surgical date. Shared decision making occurred while obtaining informed consent. The patient with be scheduled for a pre-operative education class. The patient will be ordered appropriate preoperative labs to be completed for preadmission testing.     Robotic Assisted Surgery: Yes. The use of robotic assisted surgery was discussed with the patient.  The risk, benefits were discussed regarding this.  Patient consented for this procedure.  We discussed specifically placement of pins and arrays for navigation during surgery and to help with the robotic assistance.  We discussed the  use of an additional bandage to cover the pin sites.  We also reviewed that they may have some slight pain at the placement of pin sites that should improve in the same fashion as their general recovery of the joint replacement.    We discussed the term robot, when used to describe the [employer] system, refers to the [employer] robotic arm. The CHAS System is not a robot, but a surgeon-controlled robotic-arm assisted device.      - Preoperative Consults: PAT Clearance   - Disposition: Rapid recovery  - Anesthesia Plan: Spinal, local  - Implants: Veronica, Chas  - Physical Therapy Plan: Home  - [id number]: Yes  - Surgical Approach: Direct superior  - DVT PPx: Aspirin    Risk Assessment for Post-Op Antibiotics   None present    I hereby indicate that these comorbidities may have a detrimental effect on this arthroplasty.   -None present        SUBJECTIVE  CHIEF COMPLAINT:   Chief Complaint   Patient presents with    Right Hip - Pain        HPI: Radha Puri is a 62 y.o. patient. Radha Puri has had progressive problems with the right hip over the past 2 years. They do not report any trauma.  But she does trip a lot.  They do not report any constant or progressive numbness or tingling in their legs. Their symptoms are interfering with activities which include pain with sitting or getting in and out of a car. She does limp most of the time now, due to the pain.     FUNCTIONAL STATUS: occasionally limited.  AMBULATORY STATUS: Independent community ambulation with canes/crutches  PREVIOUS TREATMENTS: NSAIDS Ibuprofen and Aleve with mild improvement  Therapy for her hips and back still taking  HISTORY OF SURGERY ON AFFECTED HIP(S): No   BACK PAIN REPORTED: Yes       REVIEW OF SYSTEMS  Constitutional: See HPI for pain assessment, No significant weight loss, recent trauma  Cardiovascular: No chest pain, shortness of breath  Respiratory: No difficulty breathing, cough  Gastrointestinal: No nausea, vomiting, diarrhea,  constipation  Musculoskeletal: Noted in HPI, positive for pain, restricted motion, stiffness  Integumentary: No rashes, easy bruising, redness   Neurological: no numbness or tingling in extremities, no gait disturbances   Psychiatric: No mood changes, memory changes, social issues  Heme/Lymph: no excessive swelling, easy bruising, excessive bleeding  ENT: No hearing changes  Eyes: No vision changes    Past Medical History:   Diagnosis Date    Acute bronchitis due to other specified organisms 08/06/2019    Acute bacterial bronchitis    Atelectasis 06/24/2013    Atelectasis    Carpal tunnel syndrome, unspecified upper limb 06/24/2013    Carpal tunnel syndrome    Chest pain, unspecified 04/02/2013    Chest pain    Chronic obstructive pulmonary disease, unspecified (CMS/HCC)     Chronic obstructive pulmonary disease    Cutaneous abscess of face 04/02/2013    Abscess of face    Enthesopathy, unspecified 04/02/2013    Tendonitis    Erysipelas 04/02/2013    Erysipelas    Gastro-esophageal reflux disease with esophagitis, without bleeding 08/14/2014    Gastro-esophageal reflux disease with esophagitis    Headache, unspecified 04/02/2013    Headache    Migraine, unspecified, not intractable, without status migrainosus 11/28/2018    Headache, migraine    Other conditions influencing health status 06/24/2013    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    Other lesions of oral mucosa 06/25/2019    Sore mouth    Pain in leg, unspecified 06/24/2013    Lower extremity pain    Personal history of malignant neoplasm of unspecified site of lip, oral cavity, and pharynx 08/14/2014    History of malignant neoplasm of pharynx    Personal history of other diseases of the digestive system 04/26/2019    History of glossitis    Personal history of other specified conditions 06/09/2014    History of heartburn    Personal history of other specified conditions 06/12/2018    History of dyspnea    Personal history of pneumonia (recurrent)  2014    History of pneumonia    Sprain of joints and ligaments of unspecified parts of neck, initial encounter 2013    Neck sprain    Zoster without complications 2013    Herpes zoster        Allergies   Allergen Reactions    Doxycycline Diarrhea        Past Surgical History:   Procedure Laterality Date    CERVICAL DISCECTOMY  2013    Spinal Diskectomy Cervical    CERVICAL FUSION  2013    Cervical Vertebral Fusion    EYE SURGERY  2013    Eye Surgery    KNEE ARTHROSCOPY W/ DEBRIDEMENT  2013    Arthroscopy Knee Right    OTHER SURGICAL HISTORY  2014    Throat Surgery    ROTATOR CUFF REPAIR  2013    Rotator Cuff Repair    SINUS SURGERY  2013    Sinus Surgery        Family History   Problem Relation Name Age of Onset    Cirrhosis Mother      Lung cancer Father          Social History     Socioeconomic History    Marital status:      Spouse name: Not on file    Number of children: Not on file    Years of education: Not on file    Highest education level: Not on file   Occupational History    Not on file   Tobacco Use    Smoking status: Former     Packs/day: 1.00     Years: 20.00     Additional pack years: 0.00     Total pack years: 20.00     Types: Cigarettes     Quit date:      Years since quittin.8     Passive exposure: Past    Smokeless tobacco: Never   Substance and Sexual Activity    Alcohol use: Yes     Alcohol/week: 2.0 standard drinks of alcohol     Types: 2 Glasses of wine per week    Drug use: Yes     Types: Marijuana    Sexual activity: Not on file   Other Topics Concern    Not on file   Social History Narrative    Not on file     Social Determinants of Health     Financial Resource Strain: Not on file   Food Insecurity: Not on file   Transportation Needs: Not on file   Physical Activity: Not on file   Stress: Not on file   Social Connections: Not on file   Intimate Partner Violence: Not on file   Housing Stability: Not on file         CURRENT MEDICATIONS:   Current Outpatient Medications   Medication Sig Dispense Refill    albuterol 2.5 mg /3 mL (0.083 %) nebulizer solution Take 3 mL (2.5 mg) by nebulization every 4 hours if needed.      albuterol 90 mcg/actuation inhaler USE 2 INHALATIONS EVERY 4 TO 6 HOURS AS NEEDED 17 g 4    cetirizine (ZYRTEC) 10 mg capsule Take by mouth.      cevimeline (Evoxac) 30 mg capsule Take 1 capsule (30 mg) by mouth 3 times a day.      cyclobenzaprine (Flexeril) 10 mg tablet TAKE 1 TABLET BY MOUTH THREE TIMES A DAY AS NEEDED FOR MUSCLE SPASM 90 tablet 0    diclofenac sodium (Voltaren) 1 % gel gel Apply 4 grams to affected knees four times daily as needed. Do not exceed 16 gram per knee joint or 32 grams in a day total. 200 g 1    diclofenac sodium (Voltaren) 1 % gel gel Apply 1 Application topically 4 times a day. 150 g 1    eletriptan (Relpax) 40 mg tablet Take 1 tablet (40 mg) by mouth 1 time if needed for migraine for up to 1 dose. 6 tablet 5    flurandrenolide (Cordran Tape Large Roll) 4 mcg/cm2 tape Apply topically.      fluticasone propion-salmeteroL (Advair Diskus) 500-50 mcg/dose diskus inhaler Inhale 1 puff 2 times a day.      ibuprofen 800 mg tablet Take by mouth.      ibuprofen 800 mg tablet Take 1 tablet (800 mg) by mouth 3 times a day as needed for mild pain (1 - 3) (pain). 90 tablet 5    L. acidophilus/Bifid. animalis 15.5 billion cell capsule Take by mouth.      lidocaine (Lidoderm) 5 % patch Apply 1 patch and leave in place for 12 hours, then remove and leave off for 12 hours. 30 patch 5    montelukast (Singulair) 10 mg tablet TAKE 1 TABLET IN THE EVENING 90 tablet 3    omeprazole (PriLOSEC) 20 mg DR capsule Take 1 capsule (20 mg) by mouth once daily.      PARoxetine (Paxil) 10 mg tablet TAKE 1 TABLET DAILY AS DIRECTED 90 tablet 3    pregabalin (Lyrica) 75 mg capsule Take 1 capsule (75 mg) by mouth 3 times a day. 90 capsule 5    zolpidem (Ambien) 5 mg tablet Take 1 tablet (5 mg) by mouth as needed  "at bedtime for sleep. 30 tablet 2     No current facility-administered medications for this visit.        OBJECTIVE    PHYSICAL EXAM      8/29/2022    11:21 AM 1/18/2023     7:57 AM 3/23/2023     9:26 AM 5/2/2023    10:10 AM 7/19/2023     7:15 AM 9/15/2023    10:09 AM 10/2/2023    12:03 PM   Vitals   Systolic  120  120 120 112 112   Diastolic  80  72 80 69 72   Heart Rate   83  81 88 78   Temp 36.3 °C (97.3 °F) 36.5 °C (97.7 °F)  36.4 °C (97.6 °F) 36.6 °C (97.9 °F) 36.4 °C (97.6 °F) 36.2 °C (97.2 °F)   Resp   18       Height (in) 1.588 m (5' 2.5\")  1.588 m (5' 2.5\")       Weight (lb) 144.3 145.5 145 142.4 145.2 144.6 135.4   BMI 25.97 kg/m2 26.19 kg/m2 26.1 kg/m2 25.63 kg/m2 26.13 kg/m2 26.03 kg/m2 24.37 kg/m2   BSA (m2) 1.7 m2 1.71 m2 1.7 m2 1.69 m2 1.7 m2 1.7 m2 1.65 m2   Visit Report    Report Report Report Report      There is no height or weight on file to calculate BMI.    GENERAL: A/Ox3, NAD. Appears healthy, well nourished  PSYCHIATRIC: Mood stable, appropriate memory recall  EYES: EOM intact, no scleral icterus  CARDIAC: regular rate  LUNGS: Breathing non-labored  SKIN: no erythema, rashes, or ecchymoses     MUSCULOSKELETAL:  Laterality: right Hip Exam  - ROM, Extension: full, no flexion contracture  - Strength: Abduction 5/5 against resitance, Flexion 5/5  - Palpation: mild TTP along greater trochanter  - Log roll/IR exam: painful and limited motion. Pain with hip flexion past 90 degrees and internal rotation  - Straight leg raise: negative  - EHL/PF/DF motor intact  - Gait: antalgic to arthritic side, negative Trendelenburg gait   - Special Tests: none performed    NEUROVASCULAR:  - Neurovascular Status: sensation intact to light touch distally  - Capillary refill brisk at extremities, Bilateral dorsalis pedis pulse 2+            IMAGING:  Multiple views of the affected right hip(s) demonstrate: Severe arthritic changes noted with complete joint space narrowing, subchondral sclerosis, marginal cystic " change and subchondral cystic change.   X-rays were personally reviewed and interpreted by me.  Radiology reports were reviewed by me as well, if readily available at the time.        Gonzalo Bishop DO  Attending Surgeon  Joint Replacement and Adult Reconstructive Surgery  [place], [place]

## 2023-10-24 DIAGNOSIS — G47.00 INSOMNIA, UNSPECIFIED TYPE: ICD-10-CM

## 2023-10-25 RX ORDER — ZOLPIDEM TARTRATE 5 MG/1
5 TABLET ORAL NIGHTLY PRN
Qty: 30 TABLET | Refills: 2 | Status: SHIPPED | OUTPATIENT
Start: 2023-10-25 | End: 2024-01-17 | Stop reason: SDUPTHER

## 2023-11-27 ENCOUNTER — TELEPHONE (OUTPATIENT)
Dept: ORTHOPEDIC SURGERY | Facility: CLINIC | Age: 63
End: 2023-11-27
Payer: COMMERCIAL

## 2023-11-27 NOTE — TELEPHONE ENCOUNTER
Patient called in and states her surgery in [place] is supposed to be on 12/21/2023 - but in Epic she is scheduled on 12/22/23 and she does not want it to be that day.     She wants it to be on 12/21/23 - please call her and discuss.     Thanks.

## 2023-11-28 NOTE — TELEPHONE ENCOUNTER
Tried to call the patient to confirm I do have her scheduled for 12/21/23 and I sent an email to [facility] about the date change. Awaiting a call back

## 2023-12-05 NOTE — PROGRESS NOTES
Thank you for attending our Joint Replacement class today in preparation for your upcoming surgery.  Topics discussed include:    MyChart Enrollment  Communication with Care Team  My Chart is the best form of communication to reach all of your caregivers  You can send messages to specific care givers, or a care team  Continued Education  You will be enrolled in a Total Joint Replacement care plan to receive additional education before and after surgery  You can review a short recording of the class content  Access to Medical Records  You can access test results, office notes, appointments, etc.  You can connect to other healthcare systems who use [employer] ([facility], [facility], [facility], etc.)  [facility]  Program Information  Consent to Enroll    Background/Understanding of Joint Replacement Surgery  Potential for same day discharge  Any questions or concerns to be directed to the surgeon's office    How to Prepare for Surgery  Use of Nicotine Products/Smoking  Stop several weeks before surgery  Such products slow down the healing process and increase risk of post-op infection and complications  Clearance for Surgery  Medical Clearance by Specialists  Dental Clearance  Cracked/Broken/Loose teeth left untreated may postpone surgery  The importance of post-op antibiotics for dental visits per surgeon protocol  Preadmission Testing  **Potential for postponed surgery if appropriate clearance is not obtained  Medication Instruction  Follow instructions provided by the doctor who prescribes your medication (typically, but not limited to cardiologist)  Preadmission testing will provide additional instructions during your appointment on what to stop and what to take as you get closer to surgery  For clarification of these instructions, please call preadmission testing directly - [phone]  Tips for Preparing the home for discharge from the hospital  Care Partner  Requirement for surgery, the patient must have a plan to have  help at home  Potential for postponed surgery if plan for home support cannot be established  How the care partner can help after surgery  CHG Body Wash/Mouth Wash  Follow the instructions given at preadmission testing  Body wash is to be used on the body and hair for 5 washes  Mouthwash is to be used the night before and morning of surgery  **This is a system-wide protocol developed by infectious disease professionals, we will not alter our recommendations for those with sensitive skin or those who have special hair needs.  Please follow the instructions as they are written as this will provide the best infection prevention measures for surgery.  Should you have an allergy to one of the products, please discuss with your preadmission team**    What to Expect in the Hospital/At Home  Morning of Surgery NPO Guidelines  Nothing to eat after midnight  Water can be consumed up to 2 hours prior to arrival  Surgical and Post-Surgical Care Team  Surgical Team  Anesthesia Team  Nursing  Physical Therapy  Care Coordinating  Pharmacy  Hospital Arrival Instructions  Arrive at the time provided to you  Consider traffic patterns (rush-hour) based on arrival time  Have arrangements made for a ride home  If discharging same day, care partner should remain at the hospital  Recovering after Surgery  Recovery Room - Visitors are not brought back  Transition to hospital room - 2nd Floor, Visitors will be directed to your room  The presence of and strategies for controlling surgical pain and swelling  The importance of early mobility  Side effects after surgery  What to expect if staying overnight    Discharge Planning  The intended plan for discharge will be for patients to discharge home  All patients require a care partner (family, friend, neighbor, etc.) to stay with the patient for the first few nights after surgery  The inability to secure help at home will postpone surgery  Home Care Services set up per surgeon order  Physical  Therapy  Occupational Therapy  **If desired, private duty care can be arranged by the patient ahead of time**  Outpatient Physical Therapy per surgeon order    Recovering at Home  Wound Care  Follow wound care instructions found in your discharge paperwork  Bandage is water-resistant and you may shower with the bandage  Do not scrub directly over the bandage  Do not submerge in water until cleared (bathtub, hot tub, pool, etc.)    Post-Op Risk Prevention  Infection Prevention  Promptly seek treatment for any infections post-operatively  Routine dental visits must be postponed for 3 months after surgery  Your surgeon may require antibiotics prior to future dental visits  Any concerns for infection not related directly to the knee or the hip should be managed by your primary care provider  Blood Clots  Be sure to complete the course of blood thinning medication as prescribed by your surgeon  Movement every 1-2 hours during the day is encouraged to prevent blood clots  Monitor for signs of blood clots  Wear compression stockings as prescribed by your surgeon  Constipation  Constipation is common following surgery  Drink plenty of fluids  Take stool softener/laxative as prescribed by your surgeon  Move around frequently  Eat foods high in fiber  Fall Prevention  Prepare home ahead of time to clear space to move with walker  Remove throw rugs and electrical cords from walkways  Install railings near any stairways with more than 2 steps  Use night lights for increased visibility at night  Continue to use your assistive device until cleared by surgeon or physical therapy  Dislocation Prevention - Not all procedures will have dislocation precautions  Follow dislocation precautions provided by your surgeon  It is OK to resume sexual activity about 6 weeks following surgery  Be sure to follow any dislocation precautions assigned    Durable Medical Equipment  Cold Therapy  Breg Cold Therapy Machines  Ice/Gel Packs  Assistive  Devices  Folding Walker with Wheels (in the front only)  No Rollators  Crutches if approved by Physical Therapy and Surgeon after surgery  Hip Kits  Raised Toilet Seats  Additional Compression Stockings    Joint Preservation  Healthy Activities when Cleared  Walking  Swimming  Bike Riding  Activities to Avoid  Refrain from repetitive motions which have a high impact on the joint  Gradual Progression  Progress activity slowly, listen to your body  Common Findings - NORMAL after surgery  Clicking/Grinding  Numbness near incision    Physical Therapy  Prehabilitation exercises  START TODAY ON BOTH LEGS  Surgery Specific Precautions  Follow surgery specific precautions found in your discharge paperwork    Follow-Up Visit  All patients will see their surgeon for a follow up visit after surgery  The visit may range from 2-6 weeks after surgery and is surgeon specific      Please don't hesitate to reach out if you have any additional questions or concerns.    Nidhi Rodgers MBA, BSN, RN-BC  Orthopedic   [facility] [phone]

## 2023-12-06 ENCOUNTER — EDUCATION (OUTPATIENT)
Dept: ORTHOPEDIC SURGERY | Facility: HOSPITAL | Age: 63
End: 2023-12-06
Payer: COMMERCIAL

## 2023-12-06 ENCOUNTER — HOSPITAL ENCOUNTER (OUTPATIENT)
Dept: RADIOLOGY | Facility: HOSPITAL | Age: 63
Discharge: HOME | End: 2023-12-06
Payer: COMMERCIAL

## 2023-12-06 ENCOUNTER — TELEPHONE (OUTPATIENT)
Dept: ORTHOPEDIC SURGERY | Facility: HOSPITAL | Age: 63
End: 2023-12-06

## 2023-12-06 DIAGNOSIS — M16.11 PRIMARY OSTEOARTHRITIS OF RIGHT HIP: ICD-10-CM

## 2023-12-06 DIAGNOSIS — M25.351 INSTABILITY OF RIGHT HIP JOINT: ICD-10-CM

## 2023-12-06 PROCEDURE — 73700 CT LOWER EXTREMITY W/O DYE: CPT | Mod: RT

## 2023-12-06 ASSESSMENT — HOOS JR
SITTING: MODERATE
BENDING TO THE FLOOR TO PICK UP OBJECT: MODERATE
WALKING ON UNEVEN SURFACE: SEVERE
GOING UP OR DOWN STAIRS: MODERATE
RISING FROM SITTING: MODERATE
HOOS JR TOTAL INTERVAL SCORE: 46.65
LYING IN BED (TURNING OVER, MAINTAINING HIP POSITION): SEVERE

## 2023-12-06 ASSESSMENT — KOOS JR
BENDING TO THE FLOOR TO PICK UP OBJECT: MODERATE
GOING UP OR DOWN STAIRS: MODERATE
RISING FROM SITTING: MODERATE
STRAIGHTENING KNEE FULLY: MODERATE
HOW SEVERE IS YOUR KNEE STIFFNESS AFTER FIRST WAKING IN MORNING: MODERATE
TWISING OR PIVOTING ON KNEE: SEVERE
STANDING UPRIGHT: SEVERE
KOOS JR SCORING: 47.49

## 2023-12-11 ENCOUNTER — PROCEDURE VISIT (OUTPATIENT)
Dept: ORTHOPEDIC SURGERY | Facility: CLINIC | Age: 63
End: 2023-12-11
Payer: COMMERCIAL

## 2023-12-11 ENCOUNTER — APPOINTMENT (OUTPATIENT)
Dept: PREADMISSION TESTING | Facility: HOSPITAL | Age: 63
End: 2023-12-11
Payer: COMMERCIAL

## 2023-12-11 PROCEDURE — A665321A HIP KIT: Performed by: ORTHOPAEDIC SURGERY

## 2023-12-12 ENCOUNTER — PRE-ADMISSION TESTING (OUTPATIENT)
Dept: PREADMISSION TESTING | Facility: HOSPITAL | Age: 63
End: 2023-12-12
Payer: COMMERCIAL

## 2023-12-12 VITALS
TEMPERATURE: 96.8 F | OXYGEN SATURATION: 96 % | BODY MASS INDEX: 23.15 KG/M2 | SYSTOLIC BLOOD PRESSURE: 142 MMHG | HEART RATE: 81 BPM | DIASTOLIC BLOOD PRESSURE: 52 MMHG | WEIGHT: 135.58 LBS | RESPIRATION RATE: 16 BRPM | HEIGHT: 64 IN

## 2023-12-12 DIAGNOSIS — Z01.818 PREOPERATIVE TESTING: Primary | ICD-10-CM

## 2023-12-12 DIAGNOSIS — R79.89 ELEVATED SERUM CREATININE: ICD-10-CM

## 2023-12-12 LAB
ALBUMIN SERPL BCP-MCNC: 4.5 G/DL (ref 3.4–5)
ALP SERPL-CCNC: 78 U/L (ref 33–136)
ALT SERPL W P-5'-P-CCNC: 13 U/L (ref 7–45)
ANION GAP SERPL CALC-SCNC: 13 MMOL/L (ref 10–20)
APPEARANCE UR: CLEAR
AST SERPL W P-5'-P-CCNC: 13 U/L (ref 9–39)
BILIRUB SERPL-MCNC: 0.7 MG/DL (ref 0–1.2)
BILIRUB UR STRIP.AUTO-MCNC: NEGATIVE MG/DL
BUN SERPL-MCNC: 23 MG/DL (ref 6–23)
CALCIUM SERPL-MCNC: 9.5 MG/DL (ref 8.6–10.3)
CHLORIDE SERPL-SCNC: 103 MMOL/L (ref 98–107)
CO2 SERPL-SCNC: 28 MMOL/L (ref 21–32)
COLOR UR: YELLOW
CREAT SERPL-MCNC: 0.87 MG/DL (ref 0.5–1.05)
ERYTHROCYTE [DISTWIDTH] IN BLOOD BY AUTOMATED COUNT: 12.5 % (ref 11.5–14.5)
GFR SERPL CREATININE-BSD FRML MDRD: 75 ML/MIN/1.73M*2
GLUCOSE SERPL-MCNC: 90 MG/DL (ref 74–99)
GLUCOSE UR STRIP.AUTO-MCNC: NEGATIVE MG/DL
HCT VFR BLD AUTO: 42.2 % (ref 36–46)
HGB BLD-MCNC: 13.8 G/DL (ref 12–16)
HOLD SPECIMEN: NORMAL
KETONES UR STRIP.AUTO-MCNC: NEGATIVE MG/DL
LEUKOCYTE ESTERASE UR QL STRIP.AUTO: ABNORMAL
MCH RBC QN AUTO: 29.9 PG (ref 26–34)
MCHC RBC AUTO-ENTMCNC: 32.7 G/DL (ref 32–36)
MCV RBC AUTO: 91 FL (ref 80–100)
NITRITE UR QL STRIP.AUTO: NEGATIVE
NRBC BLD-RTO: NORMAL /100{WBCS}
PH UR STRIP.AUTO: 6 [PH]
PLATELET # BLD AUTO: 323 X10*3/UL (ref 150–450)
POTASSIUM SERPL-SCNC: 4 MMOL/L (ref 3.5–5.3)
PROT SERPL-MCNC: 7.2 G/DL (ref 6.4–8.2)
PROT UR STRIP.AUTO-MCNC: NEGATIVE MG/DL
RBC # BLD AUTO: 4.62 X10*6/UL (ref 4–5.2)
RBC # UR STRIP.AUTO: NEGATIVE /UL
RBC #/AREA URNS AUTO: ABNORMAL /HPF
SODIUM SERPL-SCNC: 140 MMOL/L (ref 136–145)
SP GR UR STRIP.AUTO: 1.02
SQUAMOUS #/AREA URNS AUTO: ABNORMAL /HPF
TRANS CELLS #/AREA UR COMP ASSIST: ABNORMAL /HPF
UROBILINOGEN UR STRIP.AUTO-MCNC: 0.2 MG/DL
WBC # BLD AUTO: 5.8 X10*3/UL (ref 4.4–11.3)
WBC #/AREA URNS AUTO: ABNORMAL /HPF

## 2023-12-12 PROCEDURE — 93005 ELECTROCARDIOGRAM TRACING: CPT

## 2023-12-12 PROCEDURE — 81001 URINALYSIS AUTO W/SCOPE: CPT

## 2023-12-12 PROCEDURE — 99204 OFFICE O/P NEW MOD 45 MIN: CPT

## 2023-12-12 PROCEDURE — 80053 COMPREHEN METABOLIC PANEL: CPT

## 2023-12-12 PROCEDURE — 87086 URINE CULTURE/COLONY COUNT: CPT

## 2023-12-12 PROCEDURE — 36415 COLL VENOUS BLD VENIPUNCTURE: CPT

## 2023-12-12 PROCEDURE — 85027 COMPLETE CBC AUTOMATED: CPT

## 2023-12-12 PROCEDURE — 87081 CULTURE SCREEN ONLY: CPT

## 2023-12-12 RX ORDER — PREGABALIN 75 MG/1
75 CAPSULE ORAL 3 TIMES DAILY
COMMUNITY
End: 2024-01-05 | Stop reason: SDUPTHER

## 2023-12-12 RX ORDER — CHLORHEXIDINE GLUCONATE ORAL RINSE 1.2 MG/ML
15 SOLUTION DENTAL DAILY
Qty: 30 ML | Refills: 0 | Status: SHIPPED | OUTPATIENT
Start: 2023-12-12 | End: 2023-12-21 | Stop reason: HOSPADM

## 2023-12-12 ASSESSMENT — CHADS2 SCORE
HYPERTENSION: NO
AGE GREATER THAN OR EQUAL TO 75: NO
CHF: NO
DIABETES: NO
PRIOR STROKE OR TIA OR THROMBOEMBOLISM: NO
CHADS2 SCORE: 0

## 2023-12-12 ASSESSMENT — DUKE ACTIVITY SCORE INDEX (DASI)
CAN YOU WALK INDOORS, SUCH AS AROUND YOUR HOUSE: YES
CAN YOU DO MODERATE WORK AROUND THE HOUSE LIKE VACUUMING, SWEEPING FLOORS OR CARRYING GROCERIES: YES
CAN YOU DO YARD WORK LIKE RAKING LEAVES, WEEDING OR PUSHING A MOWER: NO
CAN YOU DO HEAVY WORK AROUND THE HOUSE LIKE SCRUBBING FLOORS OR LIFTING AND MOVING HEAVY FURNITURE: NO
DASI METS SCORE: 7.4
CAN YOU CLIMB A FLIGHT OF STAIRS OR WALK UP A HILL: YES
CAN YOU WALK A BLOCK OR TWO ON LEVEL GROUND: YES
CAN YOU RUN A SHORT DISTANCE: YES
CAN YOU PARTICIPATE IN STRENOUS SPORTS LIKE SWIMMING, SINGLES TENNIS, FOOTBALL, BASKETBALL, OR SKIING: NO
CAN YOU DO LIGHT WORK AROUND THE HOUSE LIKE DUSTING OR WASHING DISHES: YES
CAN YOU PARTICIPATE IN MODERATE RECREATIONAL ACTIVITIES LIKE GOLF, BOWLING, DANCING, DOUBLES TENNIS OR THROWING A BASEBALL OR FOOTBALL: YES
CAN YOU HAVE SEXUAL RELATIONS: YES
CAN YOU TAKE CARE OF YOURSELF (EAT, DRESS, BATHE, OR USE TOILET): YES
TOTAL_SCORE: 38.2

## 2023-12-12 ASSESSMENT — PAIN - FUNCTIONAL ASSESSMENT: PAIN_FUNCTIONAL_ASSESSMENT: 0-10

## 2023-12-12 ASSESSMENT — PAIN SCALES - GENERAL: PAINLEVEL_OUTOF10: 1

## 2023-12-12 ASSESSMENT — PAIN DESCRIPTION - DESCRIPTORS: DESCRIPTORS: ACHING

## 2023-12-12 NOTE — PREPROCEDURE INSTRUCTIONS
Medication List            Accurate as of December 12, 2023  9:23 AM. Always use your most recent med list.                * albuterol 2.5 mg /3 mL (0.083 %) nebulizer solution  Medication Adjustments for Surgery: Take morning of surgery with sip of water, no other fluids     * albuterol 90 mcg/actuation inhaler  USE 2 INHALATIONS EVERY 4 TO 6 HOURS AS NEEDED  Medication Adjustments for Surgery: Take morning of surgery with sip of water, no other fluids     APPLE CIDER VINEGAR ORAL  Medication Adjustments for Surgery: Stop 7 days before surgery     cetirizine 10 mg capsule  Commonly known as: ZYRTEC  Medication Adjustments for Surgery: Take morning of surgery with sip of water, no other fluids     cevimeline 30 mg capsule  Commonly known as: Evoxac  Medication Adjustments for Surgery: Take morning of surgery with sip of water, no other fluids     Cordran Tape Large Roll 4 mcg/cm2 tape  Generic drug: flurandrenolide  Medication Adjustments for Surgery: Stop 1 day before surgery     cyclobenzaprine 10 mg tablet  Commonly known as: Flexeril  TAKE 1 TABLET BY MOUTH THREE TIMES A DAY AS NEEDED FOR MUSCLE SPASM  Medication Adjustments for Surgery: Take morning of surgery with sip of water, no other fluids     diclofenac sodium 1 % gel gel  Commonly known as: Voltaren  Apply 1 Application topically 4 times a day.  Notes to patient: Stop 5 days prior to surgery     ELDERBERRY FRUIT ORAL  Medication Adjustments for Surgery: Stop 7 days before surgery     eletriptan 40 mg tablet  Commonly known as: Relpax  Take 1 tablet (40 mg) by mouth 1 time if needed for migraine for up to 1 dose.  Medication Adjustments for Surgery: Take morning of surgery with sip of water, no other fluids     fluticasone propion-salmeteroL 500-50 mcg/dose diskus inhaler  Commonly known as: Advair Diskus  Medication Adjustments for Surgery: Take morning of surgery with sip of water, no other fluids     ibuprofen 800 mg tablet  Take 1 tablet (800 mg)  by mouth 3 times a day as needed for mild pain (1 - 3) (pain).  Notes to patient: Stop 5 days prior to surgery     L. acidophilus/Bifid. animalis 15.5 billion cell capsule  Medication Adjustments for Surgery: Stop 7 days before surgery     lidocaine 5 % patch  Commonly known as: Lidoderm  Apply 1 patch and leave in place for 12 hours, then remove and leave off for 12 hours.  Notes to patient: Stop 5 days prior to surgery     montelukast 10 mg tablet  Commonly known as: Singulair  TAKE 1 TABLET IN THE EVENING  Medication Adjustments for Surgery: Take morning of surgery with sip of water, no other fluids     omeprazole 20 mg DR capsule  Commonly known as: PriLOSEC  Medication Adjustments for Surgery: Take morning of surgery with sip of water, no other fluids     PARoxetine 10 mg tablet  Commonly known as: Paxil  TAKE 1 TABLET DAILY AS DIRECTED  Medication Adjustments for Surgery: Take morning of surgery with sip of water, no other fluids     * pregabalin 75 mg capsule  Commonly known as: Lyrica  Medication Adjustments for Surgery: Take morning of surgery with sip of water, no other fluids     * pregabalin 75 mg capsule  Commonly known as: Lyrica  Take 1 capsule (75 mg) by mouth 3 times a day.  Medication Adjustments for Surgery: Take morning of surgery with sip of water, no other fluids     zolpidem 5 mg tablet  Commonly known as: Ambien  Take 1 tablet (5 mg) by mouth as needed at bedtime for sleep.  Medication Adjustments for Surgery: Continue until night before surgery           * This list has 4 medication(s) that are the same as other medications prescribed for you. Read the directions carefully, and ask your doctor or other care provider to review them with you.                                  NPO Instructions:    Do not eat any food after midnight the night before your surgery/procedure.    Additional Instructions:     Seven/Six Days before Surgery:  Review your medication instructions, stop indicated  medications  Five Days before Surgery:  Review your medication instructions, stop indicated medications  Three Days before Surgery:  Review your medication instructions, stop indicated medications  The Day before Surgery:  Review your medication instructions, stop indicated medications  You will be contacted regarding the time of your arrival to facility and surgery time  Do not eat any food after Midnight  Day of Surgery:  Review your medication instructions, take indicated medications  Wear  comfortable loose fitting clothing  Do not use moisturizers, creams, lotions or perfume  All jewelry and valuables should be left at home    Follow written instructions for CHG wash and mouth rinse that were given and explained.

## 2023-12-12 NOTE — H&P (VIEW-ONLY)
"CPM/PAT Evaluation       Name: Radha Puri (Radha Puri \"Hilda\")  /Age: 1960/63 y.o.     In-Person       Chief Complaint: Unilateral primary OA of the right hip    HPI  Patient is a 64 y/o alert and oriented female coming in for PAT for a robot assisted right total hip arthroplasty scheduled on 2023 with Dr Bishop. She reports right hip pain that she rates at a 1/10 and worsens with movement and ambulation. Patient denies Cx pain, SOB, DALLAS, and NVDC. Patient also denies Hx DVT/PE. Current medications were reviewed and a presurgical medication schedule was provided. He has no questions at this time.    +PERSONAL REPORTS OF REACTIONS TO ANESTHESIA-PONV, SMALL AIRWAY  NO PERSONAL REPORTS OF FAMILY HISTORY OF REACTIONS TO ANESTHESIA  NO PERSONAL REPORTS OF METAL, NICKEL, OR SHELLFISH ALLERGY  FORMER SMOKER-QUIT SMOKING 29 YEARS AGO. PREVIOUSLY SMOKED 1PPD X 20 YEARS   OCCASIONAL ETOH/NO DRUGS    Past Medical History:   Diagnosis Date    Acute bronchitis due to other specified organisms 2019    Acute bacterial bronchitis    Atelectasis 2013    Atelectasis    Carpal tunnel syndrome, unspecified upper limb 2013    Carpal tunnel syndrome    Chest pain, unspecified 2013    Chest pain    Chronic obstructive pulmonary disease, unspecified (CMS/[facility])     Chronic obstructive pulmonary disease    Cutaneous abscess of face 2013    Abscess of face    Diverticulosis     Enthesopathy, unspecified 2013    Tendonitis    Erysipelas 2013    Erysipelas    Gastro-esophageal reflux disease with esophagitis, without bleeding 2014    Gastro-esophageal reflux disease with esophagitis    GERD (gastroesophageal reflux disease)     Hard to intubate     Headache, unspecified 2013    Headache    Larynx cancer (CMS/[facility])     surgery to remove only    Migraine, unspecified, not intractable, without status migrainosus 2018    Headache, migraine    Occipital neuralgia     " Other conditions influencing health status 06/24/2013    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    Pain in leg, unspecified 06/24/2013    Lower extremity pain    Personal history of malignant neoplasm of unspecified site of lip, oral cavity, and pharynx 08/14/2014    History of malignant neoplasm of pharynx    Personal history of other diseases of the digestive system 04/26/2019    History of glossitis    Personal history of other specified conditions 06/09/2014    History of heartburn    Personal history of other specified conditions 06/12/2018    History of dyspnea    Personal history of pneumonia (recurrent) 01/23/2014    History of pneumonia    Sprain of joints and ligaments of unspecified parts of neck, initial encounter 04/02/2013    Neck sprain    Zoster without complications 04/02/2013    Herpes zoster     Past Surgical History:   Procedure Laterality Date    BREAST BIOPSY Right     benign    BUNIONECTOMY Left     CERVICAL DISCECTOMY  11/07/2013    Spinal Diskectomy Cervical    CERVICAL FUSION  09/12/2013    Cervical Vertebral Fusion    EYE SURGERY Bilateral 11/07/2013    Eye Surgery    KNEE ARTHROSCOPY W/ DEBRIDEMENT Right 11/07/2013    Arthroscopy Knee Right    OTHER SURGICAL HISTORY  08/16/2014    Throat Surgery to reove larynx cancer (several times)    OTHER SURGICAL HISTORY      occipital nerve stimulator to left upper chest    OTHER SURGICAL HISTORY      several cervical injections for pain    ROTATOR CUFF REPAIR Right 09/05/2013    Rotator Cuff Repair x2    SINUS SURGERY  11/07/2013    Sinus Surgery septoplasty x 2, nasal fracture x 2     Family History   Problem Relation Name Age of Onset    Cirrhosis Mother      Lung cancer Father       Allergies   Allergen Reactions    Doxycycline Diarrhea     Medication Documentation Review Audit       Reviewed by Yuliya Diaz RN (Registered Nurse) on 12/12/23 at 0908      Medication Order Taking? Sig Documenting Provider Last Dose Status   albuterol  2.5 mg /3 mL (0.083 %) nebulizer solution [id number] No Take 3 mL (2.5 mg) by nebulization every 4 hours if needed. Historical Provider, MD More than a month Active   albuterol 90 mcg/actuation inhaler [id number] Yes USE 2 INHALATIONS EVERY 4 TO 6 HOURS AS NEEDED Ori Chisholm MD 12/11/2023 Active   APPLE CIDER VINEGAR ORAL [id number] Yes Take 1 capsule by mouth once daily. Historical Provider, MD 12/11/2023 Active   cetirizine (ZYRTEC) 10 mg capsule [id number] Yes 1 capsule (10 mg) once daily. Historical Provider, MD 12/11/2023 Active   cevimeline (Evoxac) 30 mg capsule [id number] Yes Take 1 capsule (30 mg) by mouth 3 times a day. Historical Provider, MD 12/11/2023 Active   cyclobenzaprine (Flexeril) 10 mg tablet [id number] Yes TAKE 1 TABLET BY MOUTH THREE TIMES A DAY AS NEEDED FOR MUSCLE SPASM Elizabeth Donahue DO 12/11/2023 Active   diclofenac sodium (Voltaren) 1 % gel gel [id number] Yes Apply 1 Application topically 4 times a day.   Patient taking differently: Apply 1 Application topically 4 times a day as needed.    Ruth Barajas MD 12/11/2023 Active   ELDERBERRY FRUIT ORAL [id number]  Take 1 tablet by mouth once daily. Historical Provider, MD  Active   eletriptan (Relpax) 40 mg tablet [id number] No Take 1 tablet (40 mg) by mouth 1 time if needed for migraine for up to 1 dose.   Patient not taking: Reported on 12/12/2023    Ori Chisholm MD More than a month Active   flurandrenolide (Cordran Tape Large Roll) 4 mcg/cm2 tape [id number] No Apply topically once daily as needed. Historical Provider, MD More than a month Active   fluticasone propion-salmeteroL (Advair Diskus) 500-50 mcg/dose diskus inhaler [id number] Yes Inhale 1 puff 2 times a day. Historical Provider, MD Past Week Active   ibuprofen 800 mg tablet [id number] Yes Take 1 tablet (800 mg) by mouth 3 times a day as needed for mild pain (1 - 3) (pain). Ruth Barajas MD 12/12/2023 Active   L. acidophilus/Bifid. animalis 15.5 billion cell capsule [id number] Yes  Take 1 capsule by mouth once daily. Historical Provider, MD 2023 Active   lidocaine (Lidoderm) 5 % patch [id number] Yes Apply 1 patch and leave in place for 12 hours, then remove and leave off for 12 hours.   Patient taking differently: 1 patch 2 times a day as needed. Apply 1 patch and leave in place for 12 hours, then remove and leave off for 12 hours.    Elizabeth Donahue,  Past Week Active   montelukast (Singulair) 10 mg tablet [id number] Yes TAKE 1 TABLET IN THE EVENING   Patient taking differently: Take 1 tablet (10 mg) by mouth once daily.    Elizabeth Donahue DO 2023 Active   omeprazole (PriLOSEC) 20 mg DR capsule [id number] Yes Take 1 capsule (20 mg) by mouth once daily. Historical Provider, MD 2023 Active   PARoxetine (Paxil) 10 mg tablet [id number] Yes TAKE 1 TABLET DAILY AS DIRECTED Ori Chisholm MD 2023 Active   pregabalin (Lyrica) 75 mg capsule [id number]  Take 1 capsule (75 mg) by mouth 3 times a day. Ori Chisholm MD   10/02/23 2359   pregabalin (Lyrica) 75 mg capsule [id number] Yes Take 1 capsule (75 mg) by mouth 3 times a day. Historical Provider, MD 2023 Active   zolpidem (Ambien) 5 mg tablet [id number] Yes Take 1 tablet (5 mg) by mouth as needed at bedtime for sleep. Elizabeth Donahue DO 2023 Active                  Review of Systems   Constitutional: Negative for chills, decreased appetite, diaphoresis, fever and malaise/fatigue.   Eyes:  Negative for blurred vision and double vision.   Cardiovascular:  Negative for chest pain, claudication, cyanosis, dyspnea on exertion, irregular heartbeat, leg swelling, near-syncope and palpitations.   Respiratory:  Negative for cough, hemoptysis, shortness of breath and wheezing.    Endocrine: Negative for cold intolerance, heat intolerance, polydipsia, polyphagia and polyuria.   Gastrointestinal:  Negative for abdominal pain, constipation, diarrhea, dysphagia, nausea and vomiting.   Genitourinary:  Negative  "for bladder incontinence, dysuria, hematuria, incomplete emptying, nocturia, pelvic pain and urgency.   Neurological:  Negative for headaches, light-headedness, paresthesias, sensory change and weakness.   Psychiatric/Behavioral:  Negative for altered mental status.       Vitals and nursing note reviewed. Physical exam within normal limits.   Constitutional:       Appearance: Normal appearance. She is normal weight.   HENT:      Head: Normocephalic and atraumatic.      Mouth/Throat:      Mouth: Mucous membranes are moist.      Pharynx: Oropharynx is clear.   Eyes:      Extraocular Movements: Extraocular movements intact.      Conjunctiva/sclera: Conjunctivae normal.      Pupils: Pupils are equal, round, and reactive to light.   Cardiovascular:      Rate and Rhythm: Normal rate and regular rhythm.      Pulses: Normal pulses.      Heart sounds: Normal heart sounds.      No audible carotid bruit  Pulmonary:      Effort: Pulmonary effort is normal.      Breath sounds: Normal breath sounds.   Abdominal:      General: Abdomen is flat. Bowel sounds are normal.      Palpations: Abdomen is soft.   Musculoskeletal:      Cervical back: Normal range of motion and neck supple.   Skin:     General: Skin is warm and dry.      Capillary Refill: Capillary refill takes less than 2 seconds.   Neurological:      General: No focal deficit present.      Mental Status: She is alert and oriented to person, place, and time. Mental status is at baseline.   Psychiatric:         Mood and Affect: Mood normal.         Behavior: Behavior normal.         Thought Content: Thought content normal.         Judgment: Judgment normal.     PAT AIRWAY:   Airway:     Mallampati::  II    TM distance::  >3 FB    Neck ROM::  Full     Visit Vitals  /52   Pulse 81   Temp 36 °C (96.8 °F) (Temporal)   Resp 16   Ht 1.626 m (5' 4\")   Wt 61.5 kg (135 lb 9.3 oz)   SpO2 96%   BMI 23.27 kg/m²   Smoking Status Former   BSA 1.67 m²      [place] [facility] [place] 12.12.23-NSR RATE 71. NO ACUTE CHANGES    DASI Risk Score      Flowsheet Row Most Recent Value   DASI SCORE 38.2   METS Score (Will be calculated only when all the questions are answered) 7.4          Caprini DVT Assessment      Flowsheet Row Most Recent Value   DVT Score 11   Current Status Elective major lower extremity arthroplasty   History COPD, Previous malignancy   Age 60-75 years   BMI 30 or less          Modified Frailty Index    No data to display       CHADS2 Stroke Risk  Current as of 13 minutes ago        N/A 3 - 100%: High Risk   2 - 3%: Medium Risk   0 - 2%: Low Risk     Last Change: N/A          This score determines the patient's risk of having a stroke if the patient has atrial fibrillation.        This score is not applicable to this patient. Components are not calculated.          Revised Cardiac Risk Index    No data to display       Apfel Simplified Score    No data to display       Risk Analysis Index Results This Encounter    No data found in the last 1 encounters.       Stop Bang Score      Flowsheet Row Most Recent Value   Do you snore loudly? 0   Do you often feel tired or fatigued after your sleep? 0   Has anyone ever observed you stop breathing in your sleep? 0   Do you have or are you being treated for high blood pressure? 0   Recent BMI (Calculated) 23.3   Is BMI greater than 35 kg/m2? 0=No   Age older than 50 years old? 1=Yes   Is your neck circumference greater than 17 inches (Male) or 16 inches (Female)? 0   Gender - Male 0=No   STOP-BANG Total Score 1          Assessment and Plan:     Unilateral primary OA of the right hip-robot assisted right total hip arthroplasty scheduled on 12/21/2023 with Dr Bishop.    Asthma/COPD-Managed with albuterol 2.5 mg /3 mL (0.083 %) nebulizer solution, albuterol 90 mcg/actuation inhaler, and fluticasone propion-salmeteroL (Advair Diskus) 500-50 mcg/dose diskus inhaler. LSCTAB. Denies Chest pain, SOB, and DALLAS. Has not used Albuterol nebulized solution  recently but does use MDI 1x daily.     GERD-Managed with omeprazole (PriLOSEC) 20 mg DR capsule    Occipital neuralgia-has occipital nerve stimulator. Managed with pregabalin (Lyrica) 75 mg capsule and eletriptan (Relpax) 40 mg tablet    Preoperative risk assessment  ASA II  RCRI-0 POINTS CLASS I RISK 3.9%  STOP-BANGS-1 POINT LOW RISK FOR SANDY  NSQIP-PREDICTED LENGTH OF STAY 1.5 DAYS  ARISCAT-3 POINTS LOW RISK 1.6%  DASI-38.2 POINTS. 7.4 METS  PAT-0.1%  JHFRAT-6 POINTS MODERATE RISK FOR FALLS  CLEARANCE-NA  PAT TESTING-CBC, CMP, UA, MRSA PCR, EKG  CHLORHEXIDINE .12% DENTAL RINSE E-PRESCRIBED PER  INFECTION PREVENTION PROTOCOL. PATIENT EDUCATED.    *CLEARED FOR SURGERY PENDING LABS/EKG. LABS REVIEWED, STABLE.     *FACE TO FACE TIME 20 MINUTES.

## 2023-12-12 NOTE — CPM/PAT H&P
"CPM/PAT Evaluation       Name: Radha Puri (Radha Puri \"Hilda\")  /Age: 1960/63 y.o.     In-Person       Chief Complaint: Unilateral primary OA of the right hip    HPI  Patient is a 64 y/o alert and oriented female coming in for PAT for a robot assisted right total hip arthroplasty scheduled on 2023 with Dr Bishop. She reports right hip pain that she rates at a 1/10 and worsens with movement and ambulation. Patient denies Cx pain, SOB, DALLAS, and NVDC. Patient also denies Hx DVT/PE. Current medications were reviewed and a presurgical medication schedule was provided. He has no questions at this time.    +PERSONAL REPORTS OF REACTIONS TO ANESTHESIA-PONV, SMALL AIRWAY  NO PERSONAL REPORTS OF FAMILY HISTORY OF REACTIONS TO ANESTHESIA  NO PERSONAL REPORTS OF METAL, NICKEL, OR SHELLFISH ALLERGY  FORMER SMOKER-QUIT SMOKING 29 YEARS AGO. PREVIOUSLY SMOKED 1PPD X 20 YEARS   OCCASIONAL ETOH/NO DRUGS    Past Medical History:   Diagnosis Date    Acute bronchitis due to other specified organisms 2019    Acute bacterial bronchitis    Atelectasis 2013    Atelectasis    Carpal tunnel syndrome, unspecified upper limb 2013    Carpal tunnel syndrome    Chest pain, unspecified 2013    Chest pain    Chronic obstructive pulmonary disease, unspecified (CMS/[facility])     Chronic obstructive pulmonary disease    Cutaneous abscess of face 2013    Abscess of face    Diverticulosis     Enthesopathy, unspecified 2013    Tendonitis    Erysipelas 2013    Erysipelas    Gastro-esophageal reflux disease with esophagitis, without bleeding 2014    Gastro-esophageal reflux disease with esophagitis    GERD (gastroesophageal reflux disease)     Hard to intubate     Headache, unspecified 2013    Headache    Larynx cancer (CMS/[facility])     surgery to remove only    Migraine, unspecified, not intractable, without status migrainosus 2018    Headache, migraine    Occipital neuralgia     " Other conditions influencing health status 06/24/2013    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    Pain in leg, unspecified 06/24/2013    Lower extremity pain    Personal history of malignant neoplasm of unspecified site of lip, oral cavity, and pharynx 08/14/2014    History of malignant neoplasm of pharynx    Personal history of other diseases of the digestive system 04/26/2019    History of glossitis    Personal history of other specified conditions 06/09/2014    History of heartburn    Personal history of other specified conditions 06/12/2018    History of dyspnea    Personal history of pneumonia (recurrent) 01/23/2014    History of pneumonia    Sprain of joints and ligaments of unspecified parts of neck, initial encounter 04/02/2013    Neck sprain    Zoster without complications 04/02/2013    Herpes zoster     Past Surgical History:   Procedure Laterality Date    BREAST BIOPSY Right     benign    BUNIONECTOMY Left     CERVICAL DISCECTOMY  11/07/2013    Spinal Diskectomy Cervical    CERVICAL FUSION  09/12/2013    Cervical Vertebral Fusion    EYE SURGERY Bilateral 11/07/2013    Eye Surgery    KNEE ARTHROSCOPY W/ DEBRIDEMENT Right 11/07/2013    Arthroscopy Knee Right    OTHER SURGICAL HISTORY  08/16/2014    Throat Surgery to reove larynx cancer (several times)    OTHER SURGICAL HISTORY      occipital nerve stimulator to left upper chest    OTHER SURGICAL HISTORY      several cervical injections for pain    ROTATOR CUFF REPAIR Right 09/05/2013    Rotator Cuff Repair x2    SINUS SURGERY  11/07/2013    Sinus Surgery septoplasty x 2, nasal fracture x 2     Family History   Problem Relation Name Age of Onset    Cirrhosis Mother      Lung cancer Father       Allergies   Allergen Reactions    Doxycycline Diarrhea     Medication Documentation Review Audit       Reviewed by Yuliya Diaz RN (Registered Nurse) on 12/12/23 at 0908      Medication Order Taking? Sig Documenting Provider Last Dose Status   albuterol  2.5 mg /3 mL (0.083 %) nebulizer solution [id number] No Take 3 mL (2.5 mg) by nebulization every 4 hours if needed. Historical Provider, MD More than a month Active   albuterol 90 mcg/actuation inhaler [id number] Yes USE 2 INHALATIONS EVERY 4 TO 6 HOURS AS NEEDED Ori Chisholm MD 12/11/2023 Active   APPLE CIDER VINEGAR ORAL [id number] Yes Take 1 capsule by mouth once daily. Historical Provider, MD 12/11/2023 Active   cetirizine (ZYRTEC) 10 mg capsule [id number] Yes 1 capsule (10 mg) once daily. Historical Provider, MD 12/11/2023 Active   cevimeline (Evoxac) 30 mg capsule [id number] Yes Take 1 capsule (30 mg) by mouth 3 times a day. Historical Provider, MD 12/11/2023 Active   cyclobenzaprine (Flexeril) 10 mg tablet [id number] Yes TAKE 1 TABLET BY MOUTH THREE TIMES A DAY AS NEEDED FOR MUSCLE SPASM Elizabeth Donahue DO 12/11/2023 Active   diclofenac sodium (Voltaren) 1 % gel gel [id number] Yes Apply 1 Application topically 4 times a day.   Patient taking differently: Apply 1 Application topically 4 times a day as needed.    Ruth Barajas MD 12/11/2023 Active   ELDERBERRY FRUIT ORAL [id number]  Take 1 tablet by mouth once daily. Historical Provider, MD  Active   eletriptan (Relpax) 40 mg tablet [id number] No Take 1 tablet (40 mg) by mouth 1 time if needed for migraine for up to 1 dose.   Patient not taking: Reported on 12/12/2023    Ori Chisholm MD More than a month Active   flurandrenolide (Cordran Tape Large Roll) 4 mcg/cm2 tape [id number] No Apply topically once daily as needed. Historical Provider, MD More than a month Active   fluticasone propion-salmeteroL (Advair Diskus) 500-50 mcg/dose diskus inhaler [id number] Yes Inhale 1 puff 2 times a day. Historical Provider, MD Past Week Active   ibuprofen 800 mg tablet [id number] Yes Take 1 tablet (800 mg) by mouth 3 times a day as needed for mild pain (1 - 3) (pain). Ruth Barajas MD 12/12/2023 Active   L. acidophilus/Bifid. animalis 15.5 billion cell capsule [id number] Yes  Take 1 capsule by mouth once daily. Historical Provider, MD 2023 Active   lidocaine (Lidoderm) 5 % patch [id number] Yes Apply 1 patch and leave in place for 12 hours, then remove and leave off for 12 hours.   Patient taking differently: 1 patch 2 times a day as needed. Apply 1 patch and leave in place for 12 hours, then remove and leave off for 12 hours.    Elizabeth Donahue,  Past Week Active   montelukast (Singulair) 10 mg tablet [id number] Yes TAKE 1 TABLET IN THE EVENING   Patient taking differently: Take 1 tablet (10 mg) by mouth once daily.    Elizabeth Donahue DO 2023 Active   omeprazole (PriLOSEC) 20 mg DR capsule [id number] Yes Take 1 capsule (20 mg) by mouth once daily. Historical Provider, MD 2023 Active   PARoxetine (Paxil) 10 mg tablet [id number] Yes TAKE 1 TABLET DAILY AS DIRECTED Ori Chisholm MD 2023 Active   pregabalin (Lyrica) 75 mg capsule [id number]  Take 1 capsule (75 mg) by mouth 3 times a day. Ori Chisholm MD   10/02/23 2359   pregabalin (Lyrica) 75 mg capsule [id number] Yes Take 1 capsule (75 mg) by mouth 3 times a day. Historical Provider, MD 2023 Active   zolpidem (Ambien) 5 mg tablet [id number] Yes Take 1 tablet (5 mg) by mouth as needed at bedtime for sleep. Elizabeth Donahue DO 2023 Active                  Review of Systems   Constitutional: Negative for chills, decreased appetite, diaphoresis, fever and malaise/fatigue.   Eyes:  Negative for blurred vision and double vision.   Cardiovascular:  Negative for chest pain, claudication, cyanosis, dyspnea on exertion, irregular heartbeat, leg swelling, near-syncope and palpitations.   Respiratory:  Negative for cough, hemoptysis, shortness of breath and wheezing.    Endocrine: Negative for cold intolerance, heat intolerance, polydipsia, polyphagia and polyuria.   Gastrointestinal:  Negative for abdominal pain, constipation, diarrhea, dysphagia, nausea and vomiting.   Genitourinary:  Negative  "for bladder incontinence, dysuria, hematuria, incomplete emptying, nocturia, pelvic pain and urgency.   Neurological:  Negative for headaches, light-headedness, paresthesias, sensory change and weakness.   Psychiatric/Behavioral:  Negative for altered mental status.       Vitals and nursing note reviewed. Physical exam within normal limits.   Constitutional:       Appearance: Normal appearance. She is normal weight.   HENT:      Head: Normocephalic and atraumatic.      Mouth/Throat:      Mouth: Mucous membranes are moist.      Pharynx: Oropharynx is clear.   Eyes:      Extraocular Movements: Extraocular movements intact.      Conjunctiva/sclera: Conjunctivae normal.      Pupils: Pupils are equal, round, and reactive to light.   Cardiovascular:      Rate and Rhythm: Normal rate and regular rhythm.      Pulses: Normal pulses.      Heart sounds: Normal heart sounds.      No audible carotid bruit  Pulmonary:      Effort: Pulmonary effort is normal.      Breath sounds: Normal breath sounds.   Abdominal:      General: Abdomen is flat. Bowel sounds are normal.      Palpations: Abdomen is soft.   Musculoskeletal:      Cervical back: Normal range of motion and neck supple.   Skin:     General: Skin is warm and dry.      Capillary Refill: Capillary refill takes less than 2 seconds.   Neurological:      General: No focal deficit present.      Mental Status: She is alert and oriented to person, place, and time. Mental status is at baseline.   Psychiatric:         Mood and Affect: Mood normal.         Behavior: Behavior normal.         Thought Content: Thought content normal.         Judgment: Judgment normal.     PAT AIRWAY:   Airway:     Mallampati::  II    TM distance::  >3 FB    Neck ROM::  Full     Visit Vitals  /52   Pulse 81   Temp 36 °C (96.8 °F) (Temporal)   Resp 16   Ht 1.626 m (5' 4\")   Wt 61.5 kg (135 lb 9.3 oz)   SpO2 96%   BMI 23.27 kg/m²   Smoking Status Former   BSA 1.67 m²      EKG COMPLETED IN [facility] PAT " 12.12.23-NSR RATE 71. NO ACUTE CHANGES    DASI Risk Score      Flowsheet Row Most Recent Value   DASI SCORE 38.2   METS Score (Will be calculated only when all the questions are answered) 7.4          Caprini DVT Assessment      Flowsheet Row Most Recent Value   DVT Score 11   Current Status Elective major lower extremity arthroplasty   History COPD, Previous malignancy   Age 60-75 years   BMI 30 or less          Modified Frailty Index    No data to display       CHADS2 Stroke Risk  Current as of 13 minutes ago        N/A 3 - 100%: High Risk   2 - 3%: Medium Risk   0 - 2%: Low Risk     Last Change: N/A          This score determines the patient's risk of having a stroke if the patient has atrial fibrillation.        This score is not applicable to this patient. Components are not calculated.          Revised Cardiac Risk Index    No data to display       Apfel Simplified Score    No data to display       Risk Analysis Index Results This Encounter    No data found in the last 1 encounters.       Stop Bang Score      Flowsheet Row Most Recent Value   Do you snore loudly? 0   Do you often feel tired or fatigued after your sleep? 0   Has anyone ever observed you stop breathing in your sleep? 0   Do you have or are you being treated for high blood pressure? 0   Recent BMI (Calculated) 23.3   Is BMI greater than 35 kg/m2? 0=No   Age older than 50 years old? 1=Yes   Is your neck circumference greater than 17 inches (Male) or 16 inches (Female)? 0   Gender - Male 0=No   STOP-BANG Total Score 1          Assessment and Plan:     Unilateral primary OA of the right hip-robot assisted right total hip arthroplasty scheduled on 12/21/2023 with Dr Bishop.    Asthma/COPD-Managed with albuterol 2.5 mg /3 mL (0.083 %) nebulizer solution, albuterol 90 mcg/actuation inhaler, and fluticasone propion-salmeteroL (Advair Diskus) 500-50 mcg/dose diskus inhaler. LSCTAB. Denies Chest pain, SOB, and DALLAS. Has not used Albuterol nebulized solution  recently but does use MDI 1x daily.     GERD-Managed with omeprazole (PriLOSEC) 20 mg DR capsule    Occipital neuralgia-has occipital nerve stimulator. Managed with pregabalin (Lyrica) 75 mg capsule and eletriptan (Relpax) 40 mg tablet    Preoperative risk assessment  ASA II  RCRI-0 POINTS CLASS I RISK 3.9%  STOP-BANGS-1 POINT LOW RISK FOR SANDY  NSQIP-PREDICTED LENGTH OF STAY 1.5 DAYS  ARISCAT-3 POINTS LOW RISK 1.6%  DASI-38.2 POINTS. 7.4 METS  PAT-0.1%  JHFRAT-6 POINTS MODERATE RISK FOR FALLS  CLEARANCE-NA  PAT TESTING-CBC, CMP, UA, MRSA PCR, EKG  CHLORHEXIDINE .12% DENTAL RINSE E-PRESCRIBED PER  INFECTION PREVENTION PROTOCOL. PATIENT EDUCATED.    *CLEARED FOR SURGERY PENDING LABS/EKG. LABS REVIEWED, STABLE.     *FACE TO FACE TIME 20 MINUTES.

## 2023-12-14 ENCOUNTER — APPOINTMENT (OUTPATIENT)
Dept: ORTHOPEDIC SURGERY | Facility: CLINIC | Age: 63
End: 2023-12-14
Payer: COMMERCIAL

## 2023-12-14 LAB
BACTERIA UR CULT: NORMAL
STAPHYLOCOCCUS SPEC CULT: NORMAL

## 2023-12-14 PROCEDURE — A665321A HIP KIT: Performed by: ORTHOPAEDIC SURGERY

## 2023-12-15 ENCOUNTER — TELEPHONE (OUTPATIENT)
Dept: ORTHOPEDIC SURGERY | Facility: HOSPITAL | Age: 63
End: 2023-12-15
Payer: COMMERCIAL

## 2023-12-15 NOTE — TELEPHONE ENCOUNTER
Thank you for taking my call today.  All questions were answered at the time of the call, but please feel free to reach out to me via MyChart or phone, [phone], with any new questions or concerns.       We confirmed that you opted to enroll in our [id number] program so your discharge prescriptions will be available to take home at the time of discharge.       We confirmed that your plan would be to Discharge Home same day ([facility]).    We confirmed that you need additional DME for recovery and have been referred to [facility] tech for additional assistance. - We will ensure that you have a walker before you are discharged     Use the provided body wash for 4 days before surgery and complete a 5th shower on the morning of surgery, this includes your body and hair.  Follow the directions as provided during preadmission testing.  The mouth wash will be used the night before and the morning of surgery.       As a reminder, if you do not hear from our team, please call [phone] between 2pm and 3pm the business day before your surgery to confirm your arrival time and details.    Please don't hesitate to reach out with additional questions or concerns.    Nidhi Rodgers MBA, BSN, RN-BC  Orthopedic   [facility]  [phone]

## 2023-12-19 ENCOUNTER — ANESTHESIA EVENT (OUTPATIENT)
Dept: OPERATING ROOM | Facility: HOSPITAL | Age: 63
End: 2023-12-19
Payer: COMMERCIAL

## 2023-12-20 PROBLEM — M16.11 PRIMARY OSTEOARTHRITIS OF RIGHT HIP: Status: ACTIVE | Noted: 2023-12-20

## 2023-12-20 PROCEDURE — RXMED WILLOW AMBULATORY MEDICATION CHARGE

## 2023-12-20 RX ORDER — NAPROXEN SODIUM 220 MG/1
81 TABLET, FILM COATED ORAL 2 TIMES DAILY
Qty: 60 TABLET | Refills: 0 | Status: SHIPPED | OUTPATIENT
Start: 2023-12-20 | End: 2024-01-20

## 2023-12-20 RX ORDER — ACETAMINOPHEN 325 MG/1
1000 TABLET ORAL EVERY 8 HOURS PRN
Qty: 90 TABLET | Refills: 1 | Status: SHIPPED | OUTPATIENT
Start: 2023-12-20

## 2023-12-20 RX ORDER — OXYCODONE HYDROCHLORIDE 5 MG/1
5 TABLET ORAL EVERY 6 HOURS PRN
Qty: 28 TABLET | Refills: 0 | Status: SHIPPED | OUTPATIENT
Start: 2023-12-20 | End: 2023-12-28

## 2023-12-20 RX ORDER — SENNOSIDES 8.6 MG/1
1 TABLET ORAL DAILY
Qty: 30 TABLET | Refills: 0 | Status: SHIPPED | OUTPATIENT
Start: 2023-12-20 | End: 2024-01-20

## 2023-12-20 RX ORDER — TRAMADOL HYDROCHLORIDE 50 MG/1
50 TABLET ORAL EVERY 6 HOURS PRN
Qty: 28 TABLET | Refills: 0 | Status: SHIPPED | OUTPATIENT
Start: 2023-12-20 | End: 2023-12-28

## 2023-12-20 RX ORDER — CYCLOBENZAPRINE HCL 5 MG
5 TABLET ORAL 3 TIMES DAILY PRN
Qty: 30 TABLET | Refills: 0 | Status: SHIPPED | OUTPATIENT
Start: 2023-12-20 | End: 2024-01-17 | Stop reason: SDUPTHER

## 2023-12-20 RX ORDER — CELECOXIB 200 MG/1
200 CAPSULE ORAL 2 TIMES DAILY
Qty: 60 CAPSULE | Refills: 0 | Status: SHIPPED | OUTPATIENT
Start: 2023-12-20 | End: 2024-01-17 | Stop reason: ALTCHOICE

## 2023-12-20 NOTE — DISCHARGE INSTRUCTIONS
Gonzalo Bishop DO  Phone: [phone] Ryan Yanez, Medical Assistant    PLEASE READ CAREFULLY BEFORE CONTACTING YOUR PROVIDER.    WE WORK COLLABORATIVELY AS A TEAM. CALLING MULTIPLE STAFF MEMBERS REGARDING THE SAME ISSUE WILL DELAY YOUR CARE.  MYCHART IS THE PREFERRED COMMUNICATION FOR ALL TEAM MEMBERS.  ________________________________________________________________________________________________________________________________________________________________________    After Surgery Instructions: TOTAL HIP ARTHROPLASTY    The following is a general guide and may be applied to most patients that go home from the hospital after surgery. If you go to a rehab facility the timeline may deviate a little. Please do not hesitate to call our office for any questions or additional guidance. We will work with you to get the best experience from your new joint replacement.     WEEK 1  Relax…Don't Overdo it!  - Get up once an hour for light activity while you are awake. (get a drink, go to the bathroom, etc.)  - Keep the surgical site iced and elevated above your heart, if possible, while you are resting.  - You will have some light exercises given to you from the physical therapist in the hospital. They will teach you posterior hip precautions that you should be mindful of for the first six weeks after surgery.    SWELLING AND BRUISING    BRUISING AND SWELLING AFTER SURGERY IS NORMAL. As the patient becomes more mobile the bruising and swelling will begin to migrate towards the ankle and foot and is usually noticed toward the end of the day.    WEEK 2-3  You will have a prescription for physical therapy given to you or electronically prescribed and you should start outpatient therapy one week after your operation. Be sure to do the home exercises on the days you are not attending physical therapy.    - Continue to progress walking as tolerated.   - Continue to use ice for soreness on the surgical site  - You may wean from  your walker to a cane when you feel safe and comfortable to do so. Your physical therapist will also be helpful with progressing your assistive device.   - Be careful with bending and twisting - If it hurts, stop!!    FOLLOW UP  - Your first post-operative visit with Dr. Gonzalo Bishop should have been scheduled already. Please call [phone] for appointment questions  - You do not need new xrays for this visit  - Don't be nervous! This visit is to see how you are doing and make sure your incision is healing nicely and have begun working with physical therapy.       MEDICATION REFILLS - Please call main office number: [phone]    You will not receive a call indicating that your prescription has been filled.  Please contact your pharmacy with any questions.    Medication refills will be filled Monday-Friday 7am to 1pm ONLY. Please call the office or send a [employer] message for a refill request.  Any requests received outside of this timeframe will be handled on the next business day.  The office staff and orthopedic nurses cannot refill medications; messages should be left directly through the office or via my chart.  Please do not call multiple times or call other members of the care team for medication needs, this will cause the refill to take longer.    Per State and Institutional policy, pain medications can only be refilled every 7 days for up to six weeks following surgery.    DRIVING & TRAVEL AFTER SURGERY   Patients should anticipate waiting at least 3-6 weeks before traveling long distances after surgery.  At minimum you cannot be using your walker before considering driving and you cannot take any narcotic medications prior to driving. You will need to stop to walk around ever 1 hour during your travel to help with blood clot prevention.  Please call the office or your joint nurse to discuss prior to post-surgical travel.  Patients may not drive until cleared by the joint nurse or the  office.    DENTAL PROCEDURES & CLEANINGS  You must wait a minimum of 3 months for elective dental appointments (if possible, we understand emergencies happen), including routine cleanings or dental work including bridges, crowns, extractions, etc..  For any dental visit - cleaning or dental procedures - patients must take an antibiotic 1 hour before the appointment.  Antibiotics are a lifelong need before dental appointments.  The antibiotic prescribed will be based on each patient's allergies.    WOUND CARE  You will have an ace wrap on your leg put on during surgery. This compression wrap is for comfort and may be removed 24 hours after surgery. You may continue to use compression throughout your recovery if this is comfortable for you.  You have a waterproof bandage on your wound and may shower with this on. The waterproof bandage is to remain in place for 7 days. You may remove it yourself. You may leave your incision open to air after the bandage has been removed.  Under your waterproof bandage you have a mesh and glue dressin in place. Do not peel this off. This will be on for 3-4 weeks. You may trim the edges as they peel off on their own. You can continue to shower with this on. Let the water run freely over your incision when showering and do not scrub.   You may shower upon discharging from the hospital.  Soap and water is permitted to run over the surgical dressing.  Do not scrub directly over these items.  DO NOT soak your incision in a bath, hot tub, pool or pond/lake for a minimum of 3 weeks following your surgery.  DO NOT use lotions, creams, ointments on your wound for a minimum of 3 weeks following your surgery. At that time you may use vitamin E to assist with softening of your incision.    RESTARTING HOME ROUTINE - DIET & MEDICATIONS  Post-operative constipation can result due to a combination of inactivity, anesthesia and pain medication. To help prevent this, you should increase your water and  fiber intake. Physical activity such as walking will also help stimulate the bowels.   You may resume your normal diet when you discharge home.  Choose foods that help promote good bowel habits and prevent constipation, such as foods high in fiber.  You may restart your home medications the following day after your surgery UNLESS you have been given alternate instructions.  Follow the instructions given to you on your hospital discharge instructions for more information regarding your home medications.    IN-HOME PHYSICAL THERAPY & OUTPATIENT PHYSICAL THERAPY  Continue the exercises you were given in the hospital until you have been seen by in-home therapy.  Make sure to provide a phone number with the ability for the home care staff to leave a message if you do not answer your phone.    Depending on your treatment plan you will begin outpatient or home therapy after surgery. This should be arranged through the office of hospital during discharge  FOR PATIENT DOING HOME THERAPY: Outpatient physical therapy following knee replacement surgery should begin 2-3 weeks after surgery.  You should call to schedule this appointment ASAP if not already scheduled before surgery.  Waiting until you are ready for outpatient physical therapy will cause a delay in your care.  You may choose any outpatient physical therapy location.  Call the office for an order if needed.    EMERGENCIES - WHEN TO CONTACT THE SURGEON'S OFFICE IMMEDIATELY  Fever >101 with chills that has been present for at least 48 hours.   Excessive bleeding from incision that will not slow down. A small amount of drainage is normal and expected.  Once pressure is applied and the area is covered, do not continue to check the area regularly.  This will remove pressure and bleeding will continue.  Leave in place for 4-6 hours.  Signs of infection of incision-excessive drainage that is soaking through your dressing (especially if it is pus-like), redness that is  spreading out from the edges of your incision, or increased warmth around the area.  Excruciating pain for which the pain medication, taken as instructed, is not helping.  Severe calf pain.  Go directly to the emergency room or call 911, if you are experiencing chest pain or difficulty breathing.    ICE & COLD THERAPY INSTRUCTIONS    To assist with pain control and post-op swelling, you should be using ice regularly throughout recovery, especially for the first 6 weeks, regardless of the cold therapy method you use.      Always make sure there is a layer of protection between the cold pad and your skin.    If you are using ICE PACKS or GEL PACKS, you will need to alternate 20 minutes on, 20 minutes off twice per hour.    If you are using an ICE MACHINE, please follow the provided ice machine instructions.  These devices differ from ice or ice packs whereas the mechanism circulates water through tubing and a pad to provide longer periods of cold therapy to the desired site.  You can use your cold devices around the clock for optimal comfort.  We recommend using cold therapy after working with therapy or completing exercises on your own.  There is no set schedule in which you must follow while using cold therapy.  Below are a few points to remember when using a cold therapy device:    You do not need to need to use the 20 on, 20 off method.  Detach the pad from the cooler and ambulate at least once every hour.  You can check your skin under the pad at this time.  You may wear the cold therapy device during periods of sleep including overnight.  If you wake up during the night, you can check the skin at this time.  You do not need to wake up specifically to perform skin checks.  Empty the cooler and pad when device is not in use.  Follow 's instructions for cleaning your cold therapy device.      DISCHARGE MEDICATIONS - Please reference the sample schedule on the reverse side for instructions on how to best  schedule medications.    PAIN MEDICATION    _______ Oxycodone   Oxycodone has been prescribed for post-operative pain control. Take one 5 mg tablet every 6 hours for pain. Alternate with Tramadol. See medication example sheet. This medication will only be refilled ONCE every 7 days for a period of 6 weeks. After 6 weeks, you will transition to acetaminophen (Tylenol) and over -the- counter anti-inflammatories such as Ibuprofen, Advil or Aleve in conjunction with ICE.    Side effects may be constipation and nausea, vomiting, sleepiness, dizziness, lightheadedness, headache, blurred vision, dry mouth sweating, itching (if you have itching, over-the -counter Benadryl can be used as needed).   You may NOT operate a motor vehicle while taking this medication or have been cleared by your surgeon or PA.     ________ Tramadol   Tramadol has been prescribed for post-operative pain control. Take one 50 mg tablet every 6 hours for pain. Alternate with Oxycodone. See medication example sheet. This medication will only be refilled ONCE every 7 days for a period of 6 weeks. After 6 weeks, you will transition to acetaminophen (Tylenol) and over- the- counter anti-inflammatories such as Ibuprofen, Advil or Aleve in conjunction with ICE.    Side effects may be constipation and nausea, vomiting, sleepiness, dizziness, lightheadedness, headache, blurred vision, dry mouth, sweating, itching (if you have itching, over-the -counter Benadryl can be used as needed).   You may NOT operate a motor vehicle while taking this medication or have been cleared by your surgeon or PA.    NON-NARCOTIC PAIN MEDICATION     _________ Celecoxib (Celebrex) or Meloxicam (Mobic) - Prescription anti-inflammatory medication   One of these will be prescribed (if you are able to take it) as an adjunct anti-inflammatory to assist in pain control. Take one twice daily for 4 weeks. See medication example sheet. You will not receive refills on this  medication.   Side effects may include nausea or upset stomach    _________ Acetaminophen (Tylenol)   Acetaminophen has been prescribed as an adjunct for pain control. Take two 500 mg tablet every 6-8 hours for 4 weeks. See medication example sheet. No refills will be given after initial prescription.   Side effects may include nausea, heartburn, drowsiness, and headache.    _________Cyclobenzaprine (Flexeril)   This is a muscle relaxer that will be prescribed    Take this AS NEEDED per instructions on bottle   This will be only prescribed once and is available to help with muscle spasms. There will be no refills of this medication    BLOOD THINNER    __________ Aspirin or Other Blood Thinner   Aspirin or another medication has been prescribed as a blood thinner to prevent blood clots in your leg or lungs. Take as prescribed on the bottle for 4 weeks. You will not receive a refill on this medication.   Do not take this medication if you are on another blood thinner.    ANTI NAUSEA    __________ Pantoprazole   Pantoprazole has been prescribed to help with nausea and protect your stomach while taking pain medication. Take one 40 mg tablet once daily for 4 weeks. You will not receive a refill on this medication.    ANTIBIOTIC     If you are deemed “high risk” for infection after surgery and antibiotic will be prescribed. Please take this as directed for one week.     STOOL SOFTENERS    __________ Senna   Post-operative constipation can result due to a combination of inactivity, anesthesia and pain medication. To help prevent this, you should increase your water and fiber intake. Physical activity such as walking will also help stimulate the bowels.    Senna has been prescribed to help with constipation while on Oxycodone and Tramadol. Take one tablet twice daily for 4 weeks. No refills will be given.   You may also take Miralax in combination with Senna to prevent constipation.  This can be purchased over the counter  at your local pharmacy.  Take as instructed on the bottle.   Pain Medication Refills -[phone] or MyChart- Monday through Friday 7am-1pm    Medication refills will be sent upon receipt of your request during the times listed above. Due to the high call volume, you will not receive a call confirming prescription refills; please do not call multiple times.  Prescription refills may take a few hours to process, you may follow up with your pharmacy for pickup availability.    SAMPLE              The times below are an example of how to organize medications to optimize pain control  Your actual medication schedule may vary based on your last dose taken IN THE HOSPITAL      Time 3:00am 6:00am 9:00am 12:00pm 3:00pm 6:00pm 9:00pm 12:00am   Medications Tramadol Acetaminophen (Tylenol)   Oxycodone  Miralax   Blood Thinner  Colace  Pantoprazole  Tramadol  Meloxicam Acetaminophen (Tylenol)   Oxycodone Tramadol Acetaminophen (Tylenol)   Oxycodone  Miralax Blood Thinner  Colace  Tramadol   Acetaminophen (Tylenol)   Oxycodone            You may begin to wean off the pain medication as your pain remains controlled with increased activity.  The schedules provided are meant to serve as an example.  You may wean off based on your pain control.  Please note that pain medications are not filled beyond 6 weeks after surgery.              The times below are an example of how to WEAN OFF medications WHILE CONTINUING TO OPTIMIZE PAIN CONTROL.  Your actual medication schedule may vary based on your last dose taken.  Time 12:00am 4:00am 8:00am 12:00pm 4:00pm 8:00pm   Med Tramadol Oxycodone   Tramadol Oxycodone Tramadol Oxycodone     Time 12:00am 6:00am 12:00pm 6:00pm   Med Tramadol Oxycodone   Tramadol Oxycodone     Time 12:00am 8:00am 4:00pm   Med Tramadol Oxycodone   Tramadol     Time 12:00am 12:00pm   Med Tramadol Tramadol        _________________________________________________________________________________________________________________________________________________________________________    OFFICE INFORMATION    OFFICE LOCATIONS    Location 1: [facility]  [address], [address]  Office Number: [phone]    Location 2: [address]  [facility], [address]  Office Number: [phone]    Location 3: [facility]  [address] [place] [address]  Office Number: [phone]

## 2023-12-21 ENCOUNTER — ANESTHESIA (OUTPATIENT)
Dept: OPERATING ROOM | Facility: HOSPITAL | Age: 63
End: 2023-12-21
Payer: COMMERCIAL

## 2023-12-21 ENCOUNTER — HOME HEALTH ADMISSION (OUTPATIENT)
Dept: HOME HEALTH SERVICES | Facility: HOME HEALTH | Age: 63
End: 2023-12-21
Payer: COMMERCIAL

## 2023-12-21 ENCOUNTER — DOCUMENTATION (OUTPATIENT)
Dept: HOME HEALTH SERVICES | Facility: HOME HEALTH | Age: 63
End: 2023-12-21

## 2023-12-21 ENCOUNTER — APPOINTMENT (OUTPATIENT)
Dept: ORTHOPEDIC SURGERY | Facility: CLINIC | Age: 63
End: 2023-12-21
Payer: COMMERCIAL

## 2023-12-21 ENCOUNTER — PHARMACY VISIT (OUTPATIENT)
Dept: PHARMACY | Facility: CLINIC | Age: 63
End: 2023-12-21
Payer: COMMERCIAL

## 2023-12-21 ENCOUNTER — APPOINTMENT (OUTPATIENT)
Dept: RADIOLOGY | Facility: HOSPITAL | Age: 63
End: 2023-12-21
Payer: COMMERCIAL

## 2023-12-21 ENCOUNTER — HOSPITAL ENCOUNTER (OUTPATIENT)
Facility: HOSPITAL | Age: 63
Setting detail: OUTPATIENT SURGERY
Discharge: HOME | End: 2023-12-21
Attending: ORTHOPAEDIC SURGERY | Admitting: ORTHOPAEDIC SURGERY
Payer: COMMERCIAL

## 2023-12-21 VITALS
TEMPERATURE: 97.5 F | OXYGEN SATURATION: 97 % | DIASTOLIC BLOOD PRESSURE: 72 MMHG | BODY MASS INDEX: 23.52 KG/M2 | HEIGHT: 64 IN | HEART RATE: 68 BPM | WEIGHT: 137.79 LBS | RESPIRATION RATE: 15 BRPM | SYSTOLIC BLOOD PRESSURE: 119 MMHG

## 2023-12-21 DIAGNOSIS — M16.11 PRIMARY OSTEOARTHRITIS OF RIGHT HIP: Primary | ICD-10-CM

## 2023-12-21 DIAGNOSIS — M17.11 ARTHRITIS OF RIGHT KNEE: ICD-10-CM

## 2023-12-21 PROCEDURE — 3700000002 HC GENERAL ANESTHESIA TIME - EACH INCREMENTAL 1 MINUTE: Performed by: ORTHOPAEDIC SURGERY

## 2023-12-21 PROCEDURE — RXMED WILLOW AMBULATORY MEDICATION CHARGE

## 2023-12-21 PROCEDURE — 97530 THERAPEUTIC ACTIVITIES: CPT | Mod: GP

## 2023-12-21 PROCEDURE — 3600000018 HC OR TIME - INITIAL BASE CHARGE - PROCEDURE LEVEL SIX: Performed by: ORTHOPAEDIC SURGERY

## 2023-12-21 PROCEDURE — 2500000004 HC RX 250 GENERAL PHARMACY W/ HCPCS (ALT 636 FOR OP/ED): Performed by: ANESTHESIOLOGIST ASSISTANT

## 2023-12-21 PROCEDURE — 27130 TOTAL HIP ARTHROPLASTY: CPT | Performed by: NURSE ANESTHETIST, CERTIFIED REGISTERED

## 2023-12-21 PROCEDURE — 72170 X-RAY EXAM OF PELVIS: CPT

## 2023-12-21 PROCEDURE — 2500000005 HC RX 250 GENERAL PHARMACY W/O HCPCS: Performed by: ORTHOPAEDIC SURGERY

## 2023-12-21 PROCEDURE — 3700000001 HC GENERAL ANESTHESIA TIME - INITIAL BASE CHARGE: Performed by: ORTHOPAEDIC SURGERY

## 2023-12-21 PROCEDURE — 2500000004 HC RX 250 GENERAL PHARMACY W/ HCPCS (ALT 636 FOR OP/ED): Performed by: ORTHOPAEDIC SURGERY

## 2023-12-21 PROCEDURE — 2500000004 HC RX 250 GENERAL PHARMACY W/ HCPCS (ALT 636 FOR OP/ED): Performed by: ANESTHESIOLOGY

## 2023-12-21 PROCEDURE — 97161 PT EVAL LOW COMPLEX 20 MIN: CPT | Mod: GP

## 2023-12-21 PROCEDURE — 2780000003 HC OR 278 NO HCPCS: Performed by: ORTHOPAEDIC SURGERY

## 2023-12-21 PROCEDURE — 3600000017 HC OR TIME - EACH INCREMENTAL 1 MINUTE - PROCEDURE LEVEL SIX: Performed by: ORTHOPAEDIC SURGERY

## 2023-12-21 PROCEDURE — A27130 PR TOTAL HIP ARTHROPLASTY: Performed by: ANESTHESIOLOGY

## 2023-12-21 PROCEDURE — 97116 GAIT TRAINING THERAPY: CPT | Mod: GP

## 2023-12-21 PROCEDURE — A27130 PR TOTAL HIP ARTHROPLASTY: Performed by: ANESTHESIOLOGIST ASSISTANT

## 2023-12-21 PROCEDURE — 2720000007 HC OR 272 NO HCPCS: Performed by: ORTHOPAEDIC SURGERY

## 2023-12-21 PROCEDURE — 7100000001 HC RECOVERY ROOM TIME - INITIAL BASE CHARGE: Performed by: ORTHOPAEDIC SURGERY

## 2023-12-21 PROCEDURE — 2500000001 HC RX 250 WO HCPCS SELF ADMINISTERED DRUGS (ALT 637 FOR MEDICARE OP): Performed by: ORTHOPAEDIC SURGERY

## 2023-12-21 PROCEDURE — 27187 REINFORCE HIP BONES: CPT | Performed by: ORTHOPAEDIC SURGERY

## 2023-12-21 PROCEDURE — 27130 TOTAL HIP ARTHROPLASTY: CPT | Performed by: ORTHOPAEDIC SURGERY

## 2023-12-21 PROCEDURE — 7100000002 HC RECOVERY ROOM TIME - EACH INCREMENTAL 1 MINUTE: Performed by: ORTHOPAEDIC SURGERY

## 2023-12-21 PROCEDURE — C1776 JOINT DEVICE (IMPLANTABLE): HCPCS | Performed by: ORTHOPAEDIC SURGERY

## 2023-12-21 PROCEDURE — 7100000009 HC PHASE TWO TIME - INITIAL BASE CHARGE: Performed by: ORTHOPAEDIC SURGERY

## 2023-12-21 PROCEDURE — 7100000010 HC PHASE TWO TIME - EACH INCREMENTAL 1 MINUTE: Performed by: ORTHOPAEDIC SURGERY

## 2023-12-21 PROCEDURE — C1713 ANCHOR/SCREW BN/BN,TIS/BN: HCPCS | Performed by: ORTHOPAEDIC SURGERY

## 2023-12-21 PROCEDURE — A4649 SURGICAL SUPPLIES: HCPCS | Performed by: ORTHOPAEDIC SURGERY

## 2023-12-21 PROCEDURE — 97110 THERAPEUTIC EXERCISES: CPT | Mod: GP

## 2023-12-21 DEVICE — 6.5MM LOW PROFILE HEX SCREW 25MM
Type: IMPLANTABLE DEVICE | Site: HIP | Status: FUNCTIONAL
Brand: TRIDENT II

## 2023-12-21 DEVICE — 127 DEGREE NECK ANGLE HIP STEM
Type: IMPLANTABLE DEVICE | Site: HIP | Status: FUNCTIONAL
Brand: ACCOLADE

## 2023-12-21 DEVICE — CERAMIC V40 FEMORAL HEAD
Type: IMPLANTABLE DEVICE | Site: HIP | Status: FUNCTIONAL
Brand: BIOLOX

## 2023-12-21 DEVICE — BEADED CABLE AND SLEEVE SET
Type: IMPLANTABLE DEVICE | Site: HIP | Status: FUNCTIONAL
Brand: DALL-MILES

## 2023-12-21 DEVICE — INSERT, TRIDENT X3 POLYETHYLENE, 0 DEG, 32MM C: Type: IMPLANTABLE DEVICE | Site: HIP | Status: FUNCTIONAL

## 2023-12-21 DEVICE — TRIDENT II TRITANIUM CLUSTER 46C
Type: IMPLANTABLE DEVICE | Site: HIP | Status: FUNCTIONAL
Brand: TRIDENT II

## 2023-12-21 DEVICE — BONE PINS (3.2MM X 140MM): Type: IMPLANTABLE DEVICE | Site: HIP | Status: NON-FUNCTIONAL

## 2023-12-21 RX ORDER — SODIUM CHLORIDE, SODIUM LACTATE, POTASSIUM CHLORIDE, CALCIUM CHLORIDE 600; 310; 30; 20 MG/100ML; MG/100ML; MG/100ML; MG/100ML
40 INJECTION, SOLUTION INTRAVENOUS CONTINUOUS
Status: DISCONTINUED | OUTPATIENT
Start: 2023-12-21 | End: 2023-12-21 | Stop reason: HOSPADM

## 2023-12-21 RX ORDER — OXYCODONE HCL 10 MG/1
10 TABLET, FILM COATED, EXTENDED RELEASE ORAL ONCE
Status: COMPLETED | OUTPATIENT
Start: 2023-12-21 | End: 2023-12-21

## 2023-12-21 RX ORDER — CEFAZOLIN SODIUM 2 G/100ML
2 INJECTION, SOLUTION INTRAVENOUS ONCE
Status: COMPLETED | OUTPATIENT
Start: 2023-12-21 | End: 2023-12-21

## 2023-12-21 RX ORDER — FENTANYL CITRATE 50 UG/ML
50 INJECTION, SOLUTION INTRAMUSCULAR; INTRAVENOUS EVERY 5 MIN PRN
Status: DISCONTINUED | OUTPATIENT
Start: 2023-12-21 | End: 2023-12-21 | Stop reason: HOSPADM

## 2023-12-21 RX ORDER — SENNOSIDES 8.6 MG/1
2 TABLET ORAL 2 TIMES DAILY
Status: DISCONTINUED | OUTPATIENT
Start: 2023-12-21 | End: 2023-12-21 | Stop reason: HOSPADM

## 2023-12-21 RX ORDER — LABETALOL HYDROCHLORIDE 5 MG/ML
5 INJECTION, SOLUTION INTRAVENOUS EVERY 5 MIN PRN
Status: DISCONTINUED | OUTPATIENT
Start: 2023-12-21 | End: 2023-12-21 | Stop reason: HOSPADM

## 2023-12-21 RX ORDER — NALOXONE HYDROCHLORIDE 0.4 MG/ML
0.2 INJECTION, SOLUTION INTRAMUSCULAR; INTRAVENOUS; SUBCUTANEOUS EVERY 5 MIN PRN
Status: DISCONTINUED | OUTPATIENT
Start: 2023-12-21 | End: 2023-12-21 | Stop reason: HOSPADM

## 2023-12-21 RX ORDER — BISACODYL 5 MG
10 TABLET, DELAYED RELEASE (ENTERIC COATED) ORAL DAILY PRN
Status: DISCONTINUED | OUTPATIENT
Start: 2023-12-21 | End: 2023-12-21 | Stop reason: HOSPADM

## 2023-12-21 RX ORDER — VANCOMYCIN HYDROCHLORIDE 1 G/20ML
INJECTION, POWDER, LYOPHILIZED, FOR SOLUTION INTRAVENOUS AS NEEDED
Status: DISCONTINUED | OUTPATIENT
Start: 2023-12-21 | End: 2023-12-21 | Stop reason: HOSPADM

## 2023-12-21 RX ORDER — PROPOFOL 10 MG/ML
INJECTION, EMULSION INTRAVENOUS AS NEEDED
Status: DISCONTINUED | OUTPATIENT
Start: 2023-12-21 | End: 2023-12-21

## 2023-12-21 RX ORDER — CYCLOBENZAPRINE HCL 10 MG
5 TABLET ORAL 3 TIMES DAILY PRN
Status: DISCONTINUED | OUTPATIENT
Start: 2023-12-21 | End: 2023-12-21 | Stop reason: HOSPADM

## 2023-12-21 RX ORDER — ONDANSETRON 4 MG/1
4 TABLET, ORALLY DISINTEGRATING ORAL EVERY 8 HOURS PRN
Status: DISCONTINUED | OUTPATIENT
Start: 2023-12-21 | End: 2023-12-21 | Stop reason: HOSPADM

## 2023-12-21 RX ORDER — METOCLOPRAMIDE 10 MG/1
10 TABLET ORAL EVERY 6 HOURS PRN
Status: DISCONTINUED | OUTPATIENT
Start: 2023-12-21 | End: 2023-12-21 | Stop reason: HOSPADM

## 2023-12-21 RX ORDER — SCOLOPAMINE TRANSDERMAL SYSTEM 1 MG/1
1 PATCH, EXTENDED RELEASE TRANSDERMAL
Status: DISCONTINUED | OUTPATIENT
Start: 2023-12-21 | End: 2023-12-21 | Stop reason: HOSPADM

## 2023-12-21 RX ORDER — VANCOMYCIN HYDROCHLORIDE 1 G/20ML
1 INJECTION, POWDER, LYOPHILIZED, FOR SOLUTION INTRAVENOUS ONCE
Status: COMPLETED | OUTPATIENT
Start: 2023-12-21 | End: 2023-12-21

## 2023-12-21 RX ORDER — PROPOFOL 10 MG/ML
INJECTION, EMULSION INTRAVENOUS CONTINUOUS PRN
Status: DISCONTINUED | OUTPATIENT
Start: 2023-12-21 | End: 2023-12-21

## 2023-12-21 RX ORDER — CELECOXIB 200 MG/1
400 CAPSULE ORAL ONCE
Status: COMPLETED | OUTPATIENT
Start: 2023-12-21 | End: 2023-12-21

## 2023-12-21 RX ORDER — CEFAZOLIN SODIUM 2 G/100ML
2 INJECTION, SOLUTION INTRAVENOUS EVERY 6 HOURS
Status: DISCONTINUED | OUTPATIENT
Start: 2023-12-21 | End: 2023-12-21 | Stop reason: HOSPADM

## 2023-12-21 RX ORDER — ACETAMINOPHEN 325 MG/1
975 TABLET ORAL ONCE
Status: COMPLETED | OUTPATIENT
Start: 2023-12-21 | End: 2023-12-21

## 2023-12-21 RX ORDER — ACETAMINOPHEN 325 MG/1
650 TABLET ORAL EVERY 6 HOURS SCHEDULED
Status: DISCONTINUED | OUTPATIENT
Start: 2023-12-21 | End: 2023-12-21 | Stop reason: HOSPADM

## 2023-12-21 RX ORDER — MEPERIDINE HYDROCHLORIDE 25 MG/ML
12.5 INJECTION INTRAMUSCULAR; INTRAVENOUS; SUBCUTANEOUS EVERY 10 MIN PRN
Status: DISCONTINUED | OUTPATIENT
Start: 2023-12-21 | End: 2023-12-21 | Stop reason: HOSPADM

## 2023-12-21 RX ORDER — ONDANSETRON HYDROCHLORIDE 2 MG/ML
4 INJECTION, SOLUTION INTRAVENOUS ONCE AS NEEDED
Status: DISCONTINUED | OUTPATIENT
Start: 2023-12-21 | End: 2023-12-21 | Stop reason: HOSPADM

## 2023-12-21 RX ORDER — KETOROLAC TROMETHAMINE 15 MG/ML
15 INJECTION, SOLUTION INTRAMUSCULAR; INTRAVENOUS EVERY 6 HOURS
Status: DISCONTINUED | OUTPATIENT
Start: 2023-12-21 | End: 2023-12-21 | Stop reason: HOSPADM

## 2023-12-21 RX ORDER — PANTOPRAZOLE SODIUM 40 MG/1
40 TABLET, DELAYED RELEASE ORAL
Status: DISCONTINUED | OUTPATIENT
Start: 2023-12-22 | End: 2023-12-21 | Stop reason: HOSPADM

## 2023-12-21 RX ORDER — ONDANSETRON HYDROCHLORIDE 2 MG/ML
4 INJECTION, SOLUTION INTRAVENOUS EVERY 8 HOURS PRN
Status: DISCONTINUED | OUTPATIENT
Start: 2023-12-21 | End: 2023-12-21 | Stop reason: HOSPADM

## 2023-12-21 RX ORDER — METOCLOPRAMIDE HYDROCHLORIDE 5 MG/ML
10 INJECTION INTRAMUSCULAR; INTRAVENOUS EVERY 6 HOURS PRN
Status: DISCONTINUED | OUTPATIENT
Start: 2023-12-21 | End: 2023-12-21 | Stop reason: HOSPADM

## 2023-12-21 RX ORDER — MIDAZOLAM HYDROCHLORIDE 1 MG/ML
INJECTION, SOLUTION INTRAMUSCULAR; INTRAVENOUS AS NEEDED
Status: DISCONTINUED | OUTPATIENT
Start: 2023-12-21 | End: 2023-12-21

## 2023-12-21 RX ORDER — VANCOMYCIN 1 G/200ML
1 INJECTION, SOLUTION INTRAVENOUS ONCE
Status: COMPLETED | OUTPATIENT
Start: 2023-12-21 | End: 2023-12-21

## 2023-12-21 RX ORDER — PREGABALIN 75 MG/1
75 CAPSULE ORAL ONCE
Status: DISCONTINUED | OUTPATIENT
Start: 2023-12-21 | End: 2023-12-21 | Stop reason: HOSPADM

## 2023-12-21 RX ORDER — ROPIVACAINE/EPI/CLONIDINE/KET 2.46-0.005
SYRINGE (ML) INJECTION AS NEEDED
Status: DISCONTINUED | OUTPATIENT
Start: 2023-12-21 | End: 2023-12-21 | Stop reason: HOSPADM

## 2023-12-21 RX ORDER — OXYCODONE HYDROCHLORIDE 5 MG/1
10 TABLET ORAL EVERY 4 HOURS PRN
Status: DISCONTINUED | OUTPATIENT
Start: 2023-12-21 | End: 2023-12-21 | Stop reason: HOSPADM

## 2023-12-21 RX ORDER — SODIUM CHLORIDE, SODIUM LACTATE, POTASSIUM CHLORIDE, CALCIUM CHLORIDE 600; 310; 30; 20 MG/100ML; MG/100ML; MG/100ML; MG/100ML
100 INJECTION, SOLUTION INTRAVENOUS CONTINUOUS
Status: DISCONTINUED | OUTPATIENT
Start: 2023-12-21 | End: 2023-12-21 | Stop reason: HOSPADM

## 2023-12-21 RX ORDER — TRANEXAMIC ACID 100 MG/ML
1000 INJECTION, SOLUTION INTRAVENOUS 2 TIMES DAILY
Status: COMPLETED | OUTPATIENT
Start: 2023-12-21 | End: 2023-12-21

## 2023-12-21 RX ORDER — DIPHENHYDRAMINE HYDROCHLORIDE 50 MG/ML
12.5 INJECTION INTRAMUSCULAR; INTRAVENOUS ONCE AS NEEDED
Status: DISCONTINUED | OUTPATIENT
Start: 2023-12-21 | End: 2023-12-21 | Stop reason: HOSPADM

## 2023-12-21 RX ORDER — IPRATROPIUM BROMIDE 0.5 MG/2.5ML
500 SOLUTION RESPIRATORY (INHALATION) EVERY 30 MIN PRN
Status: DISCONTINUED | OUTPATIENT
Start: 2023-12-21 | End: 2023-12-21 | Stop reason: HOSPADM

## 2023-12-21 RX ORDER — DIPHENHYDRAMINE HCL 25 MG
12.5 TABLET ORAL EVERY 6 HOURS PRN
Status: DISCONTINUED | OUTPATIENT
Start: 2023-12-21 | End: 2023-12-21 | Stop reason: HOSPADM

## 2023-12-21 RX ORDER — OXYCODONE HYDROCHLORIDE 5 MG/1
5 TABLET ORAL EVERY 4 HOURS PRN
Status: DISCONTINUED | OUTPATIENT
Start: 2023-12-21 | End: 2023-12-21 | Stop reason: HOSPADM

## 2023-12-21 RX ORDER — ALBUTEROL SULFATE 0.83 MG/ML
2.5 SOLUTION RESPIRATORY (INHALATION) EVERY 30 MIN PRN
Status: DISCONTINUED | OUTPATIENT
Start: 2023-12-21 | End: 2023-12-21 | Stop reason: HOSPADM

## 2023-12-21 RX ORDER — DIPHENHYDRAMINE HYDROCHLORIDE 50 MG/ML
12.5 INJECTION INTRAMUSCULAR; INTRAVENOUS EVERY 6 HOURS PRN
Status: DISCONTINUED | OUTPATIENT
Start: 2023-12-21 | End: 2023-12-21 | Stop reason: HOSPADM

## 2023-12-21 RX ADMIN — CELECOXIB 400 MG: 200 CAPSULE ORAL at 06:06

## 2023-12-21 RX ADMIN — PROPOFOL 50 MG: 10 INJECTION, EMULSION INTRAVENOUS at 07:28

## 2023-12-21 RX ADMIN — SCOPALAMINE 1 PATCH: 1 PATCH, EXTENDED RELEASE TRANSDERMAL at 06:07

## 2023-12-21 RX ADMIN — HYDROMORPHONE HYDROCHLORIDE 0.5 MG: 1 INJECTION, SOLUTION INTRAMUSCULAR; INTRAVENOUS; SUBCUTANEOUS at 11:11

## 2023-12-21 RX ADMIN — ONDANSETRON 4 MG: 2 INJECTION INTRAMUSCULAR; INTRAVENOUS at 14:08

## 2023-12-21 RX ADMIN — SODIUM CHLORIDE, SODIUM LACTATE, POTASSIUM CHLORIDE, AND CALCIUM CHLORIDE 100 ML/HR: 600; 310; 30; 20 INJECTION, SOLUTION INTRAVENOUS at 06:17

## 2023-12-21 RX ADMIN — PROPOFOL 30 MCG/KG/MIN: 10 INJECTION, EMULSION INTRAVENOUS at 07:28

## 2023-12-21 RX ADMIN — MIDAZOLAM 2 MG: 1 INJECTION INTRAMUSCULAR; INTRAVENOUS at 07:05

## 2023-12-21 RX ADMIN — ACETAMINOPHEN 975 MG: 325 TABLET ORAL at 06:06

## 2023-12-21 RX ADMIN — PROPOFOL 50 MG: 10 INJECTION, EMULSION INTRAVENOUS at 07:35

## 2023-12-21 RX ADMIN — KETOROLAC TROMETHAMINE 15 MG: 15 INJECTION, SOLUTION INTRAMUSCULAR; INTRAVENOUS at 14:45

## 2023-12-21 RX ADMIN — VANCOMYCIN 1 G: 1 INJECTION, SOLUTION INTRAVENOUS at 06:07

## 2023-12-21 RX ADMIN — OXYCODONE HYDROCHLORIDE 10 MG: 10 TABLET, FILM COATED, EXTENDED RELEASE ORAL at 06:06

## 2023-12-21 RX ADMIN — HYDROMORPHONE HYDROCHLORIDE 0.5 MG: 1 INJECTION, SOLUTION INTRAMUSCULAR; INTRAVENOUS; SUBCUTANEOUS at 10:15

## 2023-12-21 RX ADMIN — TRANEXAMIC ACID 1000 MG: 1 INJECTION, SOLUTION INTRAVENOUS at 07:32

## 2023-12-21 RX ADMIN — CEFAZOLIN SODIUM 2 G: 2 INJECTION, SOLUTION INTRAVENOUS at 07:30

## 2023-12-21 RX ADMIN — POVIDONE-IODINE 1 APPLICATION: 5 SOLUTION TOPICAL at 06:06

## 2023-12-21 RX ADMIN — TRANEXAMIC ACID 1000 MG: 1 INJECTION, SOLUTION INTRAVENOUS at 08:24

## 2023-12-21 RX ADMIN — MIDAZOLAM 2 MG: 1 INJECTION INTRAMUSCULAR; INTRAVENOUS at 07:25

## 2023-12-21 SDOH — HEALTH STABILITY: MENTAL HEALTH: CURRENT SMOKER: 0

## 2023-12-21 SDOH — SOCIAL STABILITY: SOCIAL NETWORK: ARE YOU MARRIED, WIDOWED, DIVORCED, SEPARATED, NEVER MARRIED, OR LIVING WITH A PARTNER?: MARRIED

## 2023-12-21 SDOH — ECONOMIC STABILITY: INCOME INSECURITY: IN THE LAST 12 MONTHS, WAS THERE A TIME WHEN YOU WERE NOT ABLE TO PAY THE MORTGAGE OR RENT ON TIME?: NO

## 2023-12-21 SDOH — HEALTH STABILITY: MENTAL HEALTH: HOW OFTEN DO YOU HAVE A DRINK CONTAINING ALCOHOL?: MONTHLY OR LESS

## 2023-12-21 SDOH — ECONOMIC STABILITY: FOOD INSECURITY: WITHIN THE PAST 12 MONTHS, THE FOOD YOU BOUGHT JUST DIDN'T LAST AND YOU DIDN'T HAVE MONEY TO GET MORE.: NEVER TRUE

## 2023-12-21 SDOH — HEALTH STABILITY: MENTAL HEALTH: HOW OFTEN DO YOU HAVE 6 OR MORE DRINKS ON ONE OCCASION?: NEVER

## 2023-12-21 SDOH — HEALTH STABILITY: MENTAL HEALTH
STRESS IS WHEN SOMEONE FEELS TENSE, NERVOUS, ANXIOUS, OR CAN'T SLEEP AT NIGHT BECAUSE THEIR MIND IS TROUBLED. HOW STRESSED ARE YOU?: NOT AT ALL

## 2023-12-21 SDOH — SOCIAL STABILITY: SOCIAL INSECURITY: WITHIN THE LAST YEAR, HAVE YOU BEEN HUMILIATED OR EMOTIONALLY ABUSED IN OTHER WAYS BY YOUR PARTNER OR EX-PARTNER?: NO

## 2023-12-21 SDOH — SOCIAL STABILITY: SOCIAL INSECURITY
WITHIN THE LAST YEAR, HAVE YOU BEEN KICKED, HIT, SLAPPED, OR OTHERWISE PHYSICALLY HURT BY YOUR PARTNER OR EX-PARTNER?: NO

## 2023-12-21 SDOH — ECONOMIC STABILITY: INCOME INSECURITY: HOW HARD IS IT FOR YOU TO PAY FOR THE VERY BASICS LIKE FOOD, HOUSING, MEDICAL CARE, AND HEATING?: NOT HARD AT ALL

## 2023-12-21 SDOH — ECONOMIC STABILITY: TRANSPORTATION INSECURITY
IN THE PAST 12 MONTHS, HAS LACK OF TRANSPORTATION KEPT YOU FROM MEETINGS, WORK, OR FROM GETTING THINGS NEEDED FOR DAILY LIVING?: NO

## 2023-12-21 SDOH — SOCIAL STABILITY: SOCIAL NETWORK
DO YOU BELONG TO ANY CLUBS OR ORGANIZATIONS SUCH AS CHURCH GROUPS UNIONS, FRATERNAL OR ATHLETIC GROUPS, OR SCHOOL GROUPS?: NO

## 2023-12-21 SDOH — ECONOMIC STABILITY: TRANSPORTATION INSECURITY
IN THE PAST 12 MONTHS, HAS THE LACK OF TRANSPORTATION KEPT YOU FROM MEDICAL APPOINTMENTS OR FROM GETTING MEDICATIONS?: NO

## 2023-12-21 SDOH — ECONOMIC STABILITY: INCOME INSECURITY: IN THE PAST 12 MONTHS, HAS THE ELECTRIC, GAS, OIL, OR WATER COMPANY THREATENED TO SHUT OFF SERVICE IN YOUR HOME?: NO

## 2023-12-21 SDOH — ECONOMIC STABILITY: FOOD INSECURITY: WITHIN THE PAST 12 MONTHS, YOU WORRIED THAT YOUR FOOD WOULD RUN OUT BEFORE YOU GOT MONEY TO BUY MORE.: NEVER TRUE

## 2023-12-21 SDOH — ECONOMIC STABILITY: HOUSING INSECURITY
IN THE LAST 12 MONTHS, WAS THERE A TIME WHEN YOU DID NOT HAVE A STEADY PLACE TO SLEEP OR SLEPT IN A SHELTER (INCLUDING NOW)?: NO

## 2023-12-21 SDOH — SOCIAL STABILITY: SOCIAL INSECURITY: WITHIN THE LAST YEAR, HAVE YOU BEEN AFRAID OF YOUR PARTNER OR EX-PARTNER?: NO

## 2023-12-21 SDOH — HEALTH STABILITY: MENTAL HEALTH: HOW MANY STANDARD DRINKS CONTAINING ALCOHOL DO YOU HAVE ON A TYPICAL DAY?: 1 OR 2

## 2023-12-21 SDOH — SOCIAL STABILITY: SOCIAL NETWORK: HOW OFTEN DO YOU GET TOGETHER WITH FRIENDS OR RELATIVES?: MORE THAN THREE TIMES A WEEK

## 2023-12-21 SDOH — ECONOMIC STABILITY: HOUSING INSECURITY: IN THE LAST 12 MONTHS, HOW MANY PLACES HAVE YOU LIVED?: 1

## 2023-12-21 SDOH — SOCIAL STABILITY: SOCIAL INSECURITY
WITHIN THE LAST YEAR, HAVE TO BEEN RAPED OR FORCED TO HAVE ANY KIND OF SEXUAL ACTIVITY BY YOUR PARTNER OR EX-PARTNER?: NO

## 2023-12-21 SDOH — SOCIAL STABILITY: SOCIAL NETWORK: HOW OFTEN DO YOU ATTENT MEETINGS OF THE CLUB OR ORGANIZATION YOU BELONG TO?: NEVER

## 2023-12-21 SDOH — SOCIAL STABILITY: SOCIAL NETWORK
IN A TYPICAL WEEK, HOW MANY TIMES DO YOU TALK ON THE PHONE WITH FAMILY, FRIENDS, OR NEIGHBORS?: MORE THAN THREE TIMES A WEEK

## 2023-12-21 SDOH — HEALTH STABILITY: PHYSICAL HEALTH: ON AVERAGE, HOW MANY MINUTES DO YOU ENGAGE IN EXERCISE AT THIS LEVEL?: 20 MIN

## 2023-12-21 SDOH — HEALTH STABILITY: PHYSICAL HEALTH: ON AVERAGE, HOW MANY DAYS PER WEEK DO YOU ENGAGE IN MODERATE TO STRENUOUS EXERCISE (LIKE A BRISK WALK)?: 7 DAYS

## 2023-12-21 SDOH — SOCIAL STABILITY: SOCIAL NETWORK: HOW OFTEN DO YOU ATTEND CHURCH OR RELIGIOUS SERVICES?: NEVER

## 2023-12-21 ASSESSMENT — PAIN - FUNCTIONAL ASSESSMENT
PAIN_FUNCTIONAL_ASSESSMENT: 0-10

## 2023-12-21 ASSESSMENT — COLUMBIA-SUICIDE SEVERITY RATING SCALE - C-SSRS
2. HAVE YOU ACTUALLY HAD ANY THOUGHTS OF KILLING YOURSELF?: NO
1. IN THE PAST MONTH, HAVE YOU WISHED YOU WERE DEAD OR WISHED YOU COULD GO TO SLEEP AND NOT WAKE UP?: NO
6. HAVE YOU EVER DONE ANYTHING, STARTED TO DO ANYTHING, OR PREPARED TO DO ANYTHING TO END YOUR LIFE?: NO

## 2023-12-21 ASSESSMENT — LIFESTYLE VARIABLES
AUDIT-C TOTAL SCORE: 1
SKIP TO QUESTIONS 9-10: 1

## 2023-12-21 ASSESSMENT — PAIN SCALES - GENERAL
PAINLEVEL_OUTOF10: 6
PAINLEVEL_OUTOF10: 9
PAINLEVEL_OUTOF10: 3
PAINLEVEL_OUTOF10: 0 - NO PAIN
PAINLEVEL_OUTOF10: 4
PAINLEVEL_OUTOF10: 0 - NO PAIN
PAINLEVEL_OUTOF10: 0 - NO PAIN
PAINLEVEL_OUTOF10: 6
PAINLEVEL_OUTOF10: 0 - NO PAIN
PAINLEVEL_OUTOF10: 6
PAIN_LEVEL: 0
PAINLEVEL_OUTOF10: 0 - NO PAIN
PAINLEVEL_OUTOF10: 3

## 2023-12-21 ASSESSMENT — COGNITIVE AND FUNCTIONAL STATUS - GENERAL
STANDING UP FROM CHAIR USING ARMS: A LITTLE
CLIMB 3 TO 5 STEPS WITH RAILING: A LITTLE
WALKING IN HOSPITAL ROOM: A LITTLE
MOVING TO AND FROM BED TO CHAIR: A LITTLE
MOBILITY SCORE: 20

## 2023-12-21 ASSESSMENT — ACTIVITIES OF DAILY LIVING (ADL)
ADLS_ADDRESSED: YES
LACK_OF_TRANSPORTATION: NO
ADL_ASSISTANCE: INDEPENDENT

## 2023-12-21 NOTE — HH CARE COORDINATION
Home Care received a Referral for Physical Therapy and Occupational Therapy. We have processed the referral for a Start of Care on 12/22/23.     If you have any questions or concerns, please feel free to contact us at [phone]. Follow the prompts, enter your five digit zip code, and you will be directed to your care team on [facility].

## 2023-12-21 NOTE — OP NOTE
right TOTAL HIP ARTHROPLASTY     Date: 2023   OR Location: [facility]    Name: Radha Puri  : 1960    MRN: [id number]    Diagnosis  Primary osteoarthritis, right hip    Procedures  1.  Right total of arthroplasty  2.  Placement of prophylactic cable, right proximal femur      Surgeons      * Gonzalo Bishop - Primary     Specimen: none    Staff: Circulator: Nita Nieves RN  Scrub Person: Kassandra Girard PA-C; Baldomero August; Scarlett Drake     Drains and/or Catheters: None    Estimated Blood Loss: 50 cc    Resident/Fellow/Other Assistant:  Surgeon(s) and Role:     Procedure Summary  Anesthesia: * No anesthesia type entered *    Anesthesia Staff: Anesthesiologist: Arnaud Ch MD  C-AA: FRANCO Bennett   ASA: III       Intra-op Medications:   - 1 Gram Tranexamic acid prior to surgical incision  - 1 Gram Tranexamic acid at start of incision close  - CHILANGO Pain cockail: ropivacaine-epinephrine-clonidine-ketorolac 2.46-0.005- 0.0008-0.3mg/mL periarticular syringe: 50 cc    IMPLANTS:   Implant Name Type Inv. Item Serial No.  Lot No. LRB No. Used Action   INSERT, TRIDENT X3 POLYETHYLENE, 0 DEG, 32MM C - [id number] Joint INSERT, TRIDENT X3 POLYETHYLENE, 0 DEG, 32MM C  [employer] [id number] Right 1 Implanted   SHELL, TRIDENT II, CLUSTERHOLE, 46C - S000 - [id number] Joint Hip SHELL, TRIDENT II, CLUSTERHOLE, 46C 000 [employer] [id number] Right 1 Implanted   SCREW, LOW PROFILE HEX, 6.5 X 25 MM - [id number] Screw SCREW, LOW PROFILE HEX, 6.5 X 25 MM  [employer] [id number] Right 1 Implanted   CABLE/SLEEVE, 2.0 BEAD DALL-MILES VI - [id number] Joint CABLE/SLEEVE, 2.0 BEAD DALL-MILES VI  [employer]2 [employer] [id number] Right 1 Implanted   STEM, ACCOLADE II, 127D SZ 3 - [id number] Joint STEM, ACCOLADE II, 127D SZ 3  [employer] [id number] Right 1 Implanted   HEAD, FEMUR V40 32MM +4MM BIOLOX DELTA - [id number] Joint HEAD, FEMUR V40  32MM +4MM BIOLOX DELTA  [employer] [id number] Right 1 Implanted       Findings: See operative note    Operative Indications:  Radha Puri is a patient that is well-known to me and initially presented as an outpatient. They have been treated for advanced hip osteoarthritis with therapy, anti-inflammatories, and activity modification, all without improvement of symptoms. They are here for surgery for osteoarthritis of the right hip    We therefore reviewed the alternative option of elective total hip arthroplasty. The risks and benefits of this procedure were discussed in detail as an outpatient and are documented in our outpatient record. The patient expressed full understanding and wished to proceed. Informed consent was obtained and was placed on the medical chart    The risks, benefits and potential complications of the arthroplasty surgery were discussed with the patient in detail.  Risks including but not limited to the risk of anesthesia, DVT, pulmonary embolism with the possibility of death, retained infection, and neurovascular injury.  Specific details of the procedure, hospitalization, recovery, rehabilitation, and long-term precautions were also provided. Pre-operative teaching was provided. Implant/prosthesis selection was outlined, and the many options available were explained; the final choice will be made at the time of the procedure to match the anatomy and condition of the bone, ligaments, tendons, and muscles. Understanding of all topics was conveyed to me by the patient, and consent was given to proceed with a total hip arthroplasty.     The use of robotic assisted surgery was discussed with the patient.  The risk, benefits were discussed regarding this.  Patient consented for this procedure.  We discussed specifically placement of pins and arrays for navigation during surgery and to help with the robotic assistance.  We discussed the use of an additional bandage to cover the  pin sites.  We also reviewed that they may have some slight pain at the placement of pin sites that should improve in the same fashion as their general recovery of the joint replacement. We discussed the term robot, when used to describe the CHAS system, refers to the [employer] robotic arm. The CHAS System is not a robot, but a surgeon-controlled robotic-arm assisted device.    On the day of surgery the site of surgery was properly noted/marked if necessary per policy. The patient has been actively warmed in preoperative area. Preoperative antibiotics were confirmed and started prior to surgical incision. Venous thrombosis prophylaxis have been ordered including bilateral sequential compression devices to start in pre-operative area.     Procedure In Detail:  The patient was identified in the preoperative area .Their hip was then marked by myself and the patient agreed to proceed with the outlined procedure.. They were transferred to the operating room and positioned supine on the OR table. Appropriate surgical huddle was performed with myself present. Anesthesia was then successfully induced. The patient was then positioned in the lateral decubitus position on a regular OR table utilizing padded hip positioner. All bony prominences were well-padded. An EKG lead was placed on the lateral condyle of the femur to serve as a constant landmark to assess leg length and offset with the robotic software during surgery. The operative lower extremity was then prepped and draped in the normal sterile fashion. A critical pause was taken and we identified the patient, the site of surgery, as well as the administration of preoperative IV antibiotics. The limb was then positioned neutral on a padded Solorzano stand and bony landmarks were identified on the skin.    We began the procedure by making 3 stab incisions just proximal to the iliac crest for placement of our raise for the Chas procedure.  3 pins were placed utilizing the guide an  array was fixated in contact with the iliac crest.  A posterior- superior hip incision was then performed with the #10 blade scalpel and a minimally invasive direct superior approach was made. The subcutaneous tissues were developed down to the level of the fascia. Electrocautery was used to achieve hemostasis. The gluteus nohemi fascia was then divided in line with the skin incision and the fibers of the gluteus nohemi were split bluntly to the level of the iliotibial band. The exposed pericapsular fat was removed with electrocautery.  A tracking point was placed on the lateral aspect of the greater trochanter.  The leg length and offset was then registered utilizing the Chas.  The interval between the gluteus medius and the piriformis tendon was then developed. The conjoint tendon was isolated and released from its insertion on the trochanter, tagged and retracted posteriorly, protecting the sciatic nerve through the remainder of the case. The gluteus minimus was elevated from the capsule and a capsulotomy was then performed. Intracapsular retractors were positioned around the femoral neck and the hip was dislocated posteriorly.  The dislocated femoral head was examined and noted to have eburnated bone with minimal cartilage on the weight bearing surface     Utilizing the digital ruler the neck cut that was planned preoperative was marked out.  Using a saw, a provisional femoral neck osteotomy was then performed at a level based on the preoperative template. The femoral head was excised.  The limb was then positioned neutral on a Solorzano stand and I proceeded with acetabular exposure. A Rogelio-type retractor was placed over the anterior column and an inferior capsular retractor was positioned below the acetabular teardrop.  With the acetabulum exposed utilizing single ream technique the Reamer for corresponding shell was brought in and positioned utilizing the haptic guidance.  The Reamer was then removed and there  was good bleeding bed of bone noted and a good hemispherical Reamer in appropriate position.  The corresponding size of the last reamer was used to select a  Trident II hemispherical shell. This was then opened and impacted in approximately 40 degrees of abduction and 20 degrees of anteversion utilizing the haptic guidance to the Chas arm. The exact specifications for the abduction angle and anteversion were established preoperatively to best match patients anatomical needs in an effort to reduce the risk of post operative dislocation and psoas impingement on the implant. The polyethylene liner was then impacted into the shell.     The retractors were then position of the proximal femur and attention was drawn towards the femur. The femur was delivered into the wound and a box chisel was placed into the piriformis fossa.  The proximal femur was examined at this point and compared to the CT scan plan from a robotic software.  The patient had a significantly retroverted neck of about 31 degrees and given this.  To put an excess amount of anteversion into the stem.  A canal awl was placed down the canal without resistance. Using the Accolade Broach System, the femur was prepared to accept a broach with good fill and rotational stability.  As described above given that the put an excess amount of anteversion and her already retroverted neck this did involve putting a lot of pressure into the anterior medial calcar and due to her bone quality and thin nature of the calcar in this area and to decrease the risk of creating a postoperative or intraoperative calcar fracture I elected to place a prophylactic cable at this point.  The incision was then extended about 2 cm distally overlying the vastus lateralis.  The vastus lateralis was mobilized over the lateral aspect of the femur.  I then bluntly dissected above the lesser trochanter for a cable passer and a cable passer was then placed and passed underneath the vastus  lateralis.  1 single beaded cable was then passed, tightened, crimped and cut appropriately.  We then replaced our final broach and with this in place, there was good rotational and axial stability. The broach was noted to seat at the level of the calcar resection.  We distally had the same broach size and placement relative to the neck compared to our preoperative plan utilizing the [employer] software.  No fractures were identified.     Multiple trial head and neck combinations were then made and the reduced hip was stable to 90 degrees of flexion, 70 degrees of internal rotation, and 20 degrees of adduction. The hip could be fully extended, externally rotated, and abducted without any anterior dislocation. The leg lengths were restored equally. Intraoperative radiographs were then obtained which demonstrated satisfactory positioning of the components. No fractures were identified.  Utilizing the robotic technology our leg length was checked and noted to be restored to the contralateral side and offset restored as well.  Satisfied with the reconstruction, the hip was dislocated and the femoral trials were removed. The final femoral stem and head were then impacted without complication. The hip was reduced one final time and all of the tissues were thoroughly irrigated.  Tracker was removed from the lateral aspect of the greater trochanter and pins for the array removed as well.  An anesthetic injection cocktail was infiltrated throughout the soft tissues. The capsule was repaired anatomically with #1 Vicryl suture. The conjoint tendon was approximated to the posterior edge of the trochanter with #1 Vicryl suture and the gluteal fascia was repaired with interrupted suture. The rest of the incision was closed in layers with a final layer of monocryl and skin glue. Occlusive dresssing was then applied. The drapes were then removed. The patient was then transferred to the PACU in stable condition. All sponge and needed  counts were correct at the end of the case.     Complications:  None; patient tolerated the procedure well.    Disposition: PACU - hemodynamically stable.  Condition: stable      Plan:                         Weight Bearing Status: 50% weightbearing right lower extremity, weightbearing as tolerated left lower extremity                        Range of Motion: As tolerated                        To: none placed                        Dressing: Maintain for one week                        DVT Prophylaxis: Aspirin 8 mg twice daily for 4 weeks                        Antibiotics: Continue x24 hours charlee-operatively                         Discharge/Follow-up: Follow up in clinic in two weeks      Gonzalo Bishop DO  Attending Surgeon  Joint Replacement and Adult Reconstructive Surgery  [place], [place]      Attending Attestation: I was present and scrubbed for the entire procedure.    This note was dictated using speech recognition software and was not corrected for spelling or grammatical errors

## 2023-12-21 NOTE — CARE PLAN
Problem: Balance  Goal: LTG - Patient will maintain standing and sitting balance to allow for completion of daily activities  Outcome: Progressing     Problem: Mobility  Goal: LTG - Patient will recall and demonstrate 3 out of 3 total hip precautions during mobility  Outcome: Progressing  Goal: LTG - Patient will ambulate community distance  Outcome: Progressing     Problem: Safety  Goal: LTG - Patient will adhere to hip precautions during ADL's and transfers  Outcome: Progressing  Goal: LTG - Patient will demonstrate safety requirements appropriate to situation/environment  Outcome: Progressing     Problem: Transfers  Goal: LTG - Patient will transfer from one surface to another  Outcome: Progressing  Goal: LTG - Patient will demonstrate safe transfer techniques  Outcome: Progressing     Problem: Pain  Goal: LTG - Patient will manage pain with the appropriate technique/Intervention  Outcome: Progressing     Problem: Compromised Skin Integrity  Goal: LTG - Patient will be free from infection  Outcome: Progressing     Problem: Mobility  Goal: LTG - Patient will navigate 4-6 steps with rails/device  Outcome: Met  Goal: LTG - Patient will demonstrate maintenance of 50% weight bearing on RLE during functional mobility with rolling walker and close supervision.   Outcome: Met

## 2023-12-21 NOTE — CARE PLAN
RN TCC met with patient at the bedside to complete assessment and discuss discharge plan.  63 yr old female admitted RR following right total knee replacement with Dr. Bishop.   Confirmed post-op follow up on 1/16/2 at 9:15 am-[facility].  Plan is home today with home POT. [facility] is choice. Acceptance/SOC pending.   Spouse to transport home.

## 2023-12-21 NOTE — PROGRESS NOTES
Physical Therapy    Physical Therapy Evaluation & Treatment    Patient Name: Hilda Puri  MRN: [id number]  Today's Date: 12/21/2023   Time Calculation  Start Time: 1302  Stop Time: 1412  Time Calculation (min): 70 min    Assessment/Plan   PT Assessment  PT Assessment Results: Decreased strength, Decreased range of motion, Decreased endurance, Impaired balance, Decreased mobility, Orthopedic restrictions, Pain  Rehab Prognosis: Excellent  Evaluation/Treatment Tolerance: Patient limited by pain, Treatment limited secondary to medical complications (Comment) (reports of intermittent nausea throughout session; RN administer medications at end of session)  Strengths: Ability to acquire knowledge, Access to adaptive/assistive products, Capable of completing ADLs semi/independent, Insight into problems, Rehab experience, Support of Caregivers, Support and attitude of living partners, Support of extended family/friends  End of Session Communication: Bedside nurse  Assessment Comment: Patient presenting with decreased functional mobility, increased pain, and bouts of nausea following R posterior LUCIO performed on 12/21. Education regarding posterior hip precautions and weight bearing status provided prior to mobility; patient demonstrated understanding throughout PT session. Therapeutic exercises performed in supine and HEP provided. Patient able to maintain 50% PWBing of RLE during functional mobility with cueing and supervision from PT. RN notified of patient presentation. PT recommends DC home with home health PT and 24/7 supervision.  End of Session Patient Position: Up in chair with BLE elevated and ice to surgical site. Emesis sack and call light left in reach; patient instructed not to get up on own. RN present at time of PT exit.     IP OR SWING BED PT PLAN  Inpatient or Swing Bed: Inpatient  PT Plan  Treatment/Interventions: Bed mobility, Transfer training, Gait training, Stair training, Balance training,  Strengthening, Endurance training, Range of motion, Therapeutic exercise, Therapeutic activity, Home exercise program  PT Plan: Skilled PT  PT Frequency: BID  PT Discharge Recommendations: Low intensity level of continued care  Equipment Recommended upon Discharge: Wheeled walker  PT Recommended Transfer Status: Stand by assist, Assistive device  PT - OK to Discharge: Yes      Subjective     General Visit Information:  General  Reason for Referral: R posterior LUCIO  Referred By: Dr. Bishop  Past Medical History Relevant to Rehab: COPD, atelectasis, ex-smoker, dyspnea, GERD, migraines, sinus sx, larynx CA, c-spine fusion, cervical discectomy, herpes zoster, pneumonia, knee scope  Family/Caregiver Present: Yes ( Domenic)  Prior to Session Communication: Bedside nurse  Patient Position Received: Bed, 3 rail up  General Comment: Patient sleeping in bed upon PT arrival with spouse present; easily arousable to verbal stimuli. Agreeable to PT evaluation; session cleared by RN.  Home Living:  Home Living  Type of Home: House  Lives With: Spouse, Adult children  Home Adaptive Equipment: Walker rolling or standard, Cane, Crutches, Reacher, Sock aid, Long-handled shoehorn, Long-handled sponge  Home Layout: Two level, Able to live on main level with bedroom/bathroom, Stairs to alternate level with rails  Alternate Level Stairs-Rails: Left  Alternate Level Stairs-Number of Steps: 13  Home Access: Stairs to enter with rails  Entrance Stairs-Rails: Left  Entrance Stairs-Number of Steps: 2  Bathroom Shower/Tub: Walk-in shower  Bathroom Equipment: Grab bars in shower, Shower chair with back  Home Living Comments: Patient reports her daughter will be staying with her for about 3 weeks upon DC and can provide 24/7 assist/supervision.  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  Precautions:  Precautions  LE Weight Bearing Status: Right Partial Weight Bearing (50%)  Post-Surgical Precautions: Right hip precautions      Objective   Pain:  Pain Assessment  Pain Assessment: 0-10  Pain Score: 6  Pain Type: Surgical pain  Pain Location: Hip  Pain Orientation: Right  Pain Interventions: Cold applied  Cognition:  Cognition  Overall Cognitive Status: Within Functional Limits  Attention: Within Functional Limits  Memory: Within Funtional Limits  Problem Solving: Within Functional Limits  Safety/Judgement: Within Functional Limits    General Assessments:  Activity Tolerance  Endurance: Tolerates 30 min exercise with multiple rests, Decreased tolerance for upright activites (Patient required wheelchair follow and propulsion during session due to R hip soreness and reports of nausea; RN present at end of session administering anti-nausea med.)    Sensation  Light Touch: No apparent deficits  Proprioception: No apparent deficits      Coordination  Movements are Fluid and Coordinated: Yes    Postural Control  Postural Control: Within Functional Limits    Static Sitting Balance  Static Sitting-Balance Support: Feet supported  Static Sitting-Level of Assistance: Independent  Static Sitting-Comment/Number of Minutes: patient sat EOB approx 8 minutes during session due to nausea; good sitting balance without postural sway  Dynamic Sitting Balance  Dynamic Sitting-Balance Support: Feet supported  Dynamic Sitting-Balance: Reaching across midline  Dynamic Sitting-Comments: distant supervision; patient demonstrated good sitting balance at EOB during LE dressing    Static Standing Balance  Static Standing-Balance  Support: Bilateral upper extremity supported  Static Standing-Level of Assistance: Distant supervision  Dynamic Standing Balance  Dynamic Standing-Balance Support: Bilateral upper extremity supported  Dynamic Standing-Comments: close supervision with rolling walker; verbal cues for maintenance of PWB on RLE  Functional Assessments:  ADL  ADL's Addressed: Yes  Equipment: Sock aid  LE Dressing Assistance: Minimal  LE Dressing Deficit: Verbal cueing, Supervision/safety, Increased time to complete, Don/doff R sock, Don/doff L sock, Thread RLE into pants, Thread LLE into pants, Thread RLE into underwear, Thread LLE into underwear, Don/doff R shoe, Don/doff L shoe, Use of adaptive equipment (PT assisted with threading BLE through underwear and pants; instructed patient on use of sock aid; patient able to demonstrate understanding. PT educated patient on importance of using adaptive equipment for LE dressing; patient verbalized understanding)    Bed Mobility  Bed Mobility: Yes  Bed Mobility 1  Bed Mobility 1: Supine to sitting  Level of Assistance 1: Distant supervision    Transfers  Transfer: Yes  Transfer 1  Transfer From 1: Bed to, Stand to  Transfer to 1: Stand, Toilet, Wheelchair, Chair with arms  Technique 1: Sit to stand, Stand to sit  Transfer Device 1: Walker  Transfer Level of Assistance 1: Close supervision, Minimal verbal cues (verbal cues for maintaining PWB on RLE and extending R knee when performing stand>sit transfer; patient demonstrated understanding)  Trials/Comments 1: x5    Ambulation/Gait Training  Ambulation/Gait Training Performed: Yes  Ambulation/Gait Training 1  Surface 1: Level tile  Device 1: Rolling walker  Gait Support Devices: Wheelchair follow  Assistance 1: Close supervision, Minimal verbal cues  Quality of Gait 1: Decreased step length, Antalgic (dec erika; step to pattern to promote 50% WB on RLE; PT provided education on maintaining PWBing with rolling walker during ambulation by  "increasing WBing on BUE to advance LLE forward during R stance; patient demonstrated understanding with cues)  Comments/Distance (ft) 1: 10 feet x2; 100 feet (patient required wheelchair propulsion by PT to arrive at stairs and back to room due to reports of nausea and R hip soreness)    Stairs  Stairs: Yes  Stairs  Rails 1: Left  Device 1: Railing  Assistance 1: Close supervision, Minimal verbal cues (verbal cues for increased WBing through BUE on handrail and to ascend/descend leading with LLE; patient demonstrated understanding with repeated cues)  Comment/Number of Steps 1: six 6\" steps  Extremity/Trunk Assessments:  RUE   RUE : Within Functional Limits  LUE   LUE: Within Functional Limits  RLE   RLE : Exceptions to WFL  AROM RLE (degrees)  RLE AROM Comment: R hip flexion to 90 degrees within precautions  Strength RLE  RLE Overall Strength: Greater than or equal to 3/5 as evidenced by functional mobility  LLE   LLE : Within Functional Limits  Treatments:  Therapeutic Exercise  Therapeutic Exercise Performed: Yes B ankle pumps, R quad sets, R gluteal sets, R heel slides, and R SAQ x 10 reps each.    Outcome Measures:  [facility] Basic Mobility  Turning from your back to your side while in a flat bed without using bedrails: None  Moving from lying on your back to sitting on the side of a flat bed without using bedrails: None  Moving to and from bed to chair (including a wheelchair): A little  Standing up from a chair using your arms (e.g. wheelchair or bedside chair): A little  To walk in hospital room: A little  Climbing 3-5 steps with railing: A little  Basic Mobility - Total Score: 20    Encounter Problems       Encounter Problems (Active)       Balance       LTG - Patient will maintain standing and sitting balance to allow for completion of daily activities (Progressing)       Start:  12/21/23               Compromised Skin Integrity       LTG - Patient will be free from infection (Progressing)       Start:  " 12/21/23               Mobility       LTG - Patient will recall and demonstrate 3 out of 3 total hip precautions during mobility (Progressing)       Start:  12/21/23            LTG - Patient will ambulate community distance (Progressing)       Start:  12/21/23            LTG - Patient will navigate 4-6 steps with rails/device (Met)       Start:  12/21/23    Resolved:  12/21/23         LTG - Patient will demonstrate maintenance of 50% weight bearing on RLE during functional mobility with rolling walker and close supervision.  (Met)       Start:  12/21/23    Resolved:  12/21/23            Pain          Safety       LTG - Patient will adhere to hip precautions during ADL's and transfers (Progressing)       Start:  12/21/23            LTG - Patient will demonstrate safety requirements appropriate to situation/environment (Progressing)       Start:  12/21/23               Transfers       LTG - Patient will transfer from one surface to another (Progressing)       Start:  12/21/23            LTG - Patient will demonstrate safe transfer techniques (Progressing)       Start:  12/21/23                   Education Documentation  Handouts, taught by Laureen Ignacio PT at 12/21/2023  2:52 PM.  Learner: Significant Other, Patient  Readiness: Acceptance  Method: Explanation, Demonstration, Handout  Response: Verbalizes Understanding, Demonstrated Understanding    Precautions, taught by Laureen Ignacio PT at 12/21/2023  2:52 PM.  Learner: Significant Other, Patient  Readiness: Acceptance  Method: Explanation, Demonstration, Handout  Response: Verbalizes Understanding, Demonstrated Understanding    Body Mechanics, taught by Laureen Ignacio PT at 12/21/2023  2:52 PM.  Learner: Significant Other, Patient  Readiness: Acceptance  Method: Explanation, Demonstration, Handout  Response: Verbalizes Understanding, Demonstrated Understanding    Home Exercise Program, taught by Laureen Ignacio PT at 12/21/2023  2:52 PM.  Learner:  Significant Other, Patient  Readiness: Acceptance  Method: Explanation, Demonstration, Handout  Response: Verbalizes Understanding, Demonstrated Understanding    Mobility Training, taught by Laureen Ignacio PT at 12/21/2023  2:52 PM.  Learner: Significant Other, Patient  Readiness: Acceptance  Method: Explanation, Demonstration, Handout  Response: Verbalizes Understanding, Demonstrated Understanding    Education Comments  No comments found.      Laureen Ignacio PT, DPT

## 2023-12-21 NOTE — PROGRESS NOTES
Medication Education     Medication education for Radha Puri was provided to the patient and family for the following medication(s):  Aspirin  Oxycodone  Tramadol  Flexeril  Celebrex  Tylenol  Docusate    M2B delivered.        Medication education provided by a Pharmacist:  Dose, frequency, storage How to take and what to do if a dose is missed Proper dose, indication, possible ADRs How the medication works and benefits of taking it Benefits of taking the medication     Identified potential barriers to education:  None    Method(s) of Education:  Verbal Written materials provided and reviewed    An opportunity to ask questions and receive answers was provided.     Assessment of understanding the patient and family:  2= meets goals/outcomes    Additional Notes (if applicable):     Ori Mares, PharmD

## 2023-12-21 NOTE — ANESTHESIA POSTPROCEDURE EVALUATION
"Patient: Radha Puri \"Hilda\"    Procedure Summary       Date: 12/21/23 Room / Location: [facility] / Virtual [facility]    Anesthesia Start: 0700 Anesthesia Stop: 0913    Procedure: ROBOT ASSISTED RIGHT TOTAL HIP ARTHROPLASTY RAPID RECOVERY ([place], BRIAN) (Right: Hip) Diagnosis:       Arthritis of right knee      (Arthritis of right knee [M17.11])    Surgeons: Gonzalo Bishop DO Responsible Provider: Arnaud Ch MD    Anesthesia Type: spinal ASA Status: 3            Anesthesia Type: spinal    Vitals Value Taken Time   /56 12/21/23 0930   Temp 36.3 °C (97.3 °F) 12/21/23 0915   Pulse 67 12/21/23 0930   Resp 10 12/21/23 0930   SpO2 100 % 12/21/23 0930       Anesthesia Post Evaluation    Patient location during evaluation: bedside  Patient participation: complete - patient participated  Level of consciousness: responsive to verbal stimuli  Pain score: 0  Pain management: adequate  Multimodal analgesia pain management approach  Airway patency: patent  Cardiovascular status: acceptable  Respiratory status: acceptable  Hydration status: acceptable  Postoperative Nausea and Vomiting: none  Comments: PT HEMODYNAMICALLY STABLE, NO PONV, AWAKE, ALERT AND ORIENTATED TIMES THREE        There were no known notable events for this encounter.    "

## 2023-12-21 NOTE — ANESTHESIA PREPROCEDURE EVALUATION
"Patient: Radha Puri \"Hilda\"    Procedure Information       Date/Time: 12/21/23 0700    Procedure: ROBOT ASSISTED RIGHT TOTAL HIP ARTHROPLASTY RAPID RECOVERY (BRIAN [place]) (Right: Hip)    Location: [facility] / [facility]    Surgeons: Gonzalo Bishop DO          Past Medical History:   Diagnosis Date    Acute bronchitis due to other specified organisms 08/06/2019    Acute bacterial bronchitis    Atelectasis 06/24/2013    Atelectasis    Carpal tunnel syndrome, unspecified upper limb 06/24/2013    Carpal tunnel syndrome    Chest pain, unspecified 04/02/2013    Chest pain    Chronic obstructive pulmonary disease, unspecified (CMS/[facility])     Chronic obstructive pulmonary disease    Cutaneous abscess of face 04/02/2013    Abscess of face    Diverticulosis     Enthesopathy, unspecified 04/02/2013    Tendonitis    Erysipelas 04/02/2013    Erysipelas    Gastro-esophageal reflux disease with esophagitis, without bleeding 08/14/2014    Gastro-esophageal reflux disease with esophagitis    GERD (gastroesophageal reflux disease)     Hard to intubate     Headache, unspecified 04/02/2013    Headache    Larynx cancer (CMS/[facility])     surgery to remove only    Migraine, unspecified, not intractable, without status migrainosus 11/28/2018    Headache, migraine    Occipital neuralgia     Other conditions influencing health status 06/24/2013    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    Pain in leg, unspecified 06/24/2013    Lower extremity pain    Personal history of malignant neoplasm of unspecified site of lip, oral cavity, and pharynx 08/14/2014    History of malignant neoplasm of pharynx    Personal history of other diseases of the digestive system 04/26/2019    History of glossitis    Personal history of other specified conditions 06/09/2014    History of heartburn    Personal history of other specified conditions 06/12/2018    History of dyspnea    Personal history of pneumonia (recurrent) 01/23/2014    History of " pneumonia    Sprain of joints and ligaments of unspecified parts of neck, initial encounter 04/02/2013    Neck sprain    Zoster without complications 04/02/2013    Herpes zoster     Past Surgical History:   Procedure Laterality Date    BREAST BIOPSY Right     benign    BUNIONECTOMY Left     CERVICAL DISCECTOMY  11/07/2013    Spinal Diskectomy Cervical    CERVICAL FUSION  09/12/2013    Cervical Vertebral Fusion    EYE SURGERY Bilateral 11/07/2013    Eye Surgery    KNEE ARTHROSCOPY W/ DEBRIDEMENT Right 11/07/2013    Arthroscopy Knee Right    OTHER SURGICAL HISTORY  08/16/2014    Throat Surgery to reove larynx cancer (several times)    OTHER SURGICAL HISTORY      occipital nerve stimulator to left upper chest    OTHER SURGICAL HISTORY      several cervical injections for pain    ROTATOR CUFF REPAIR Right 09/05/2013    Rotator Cuff Repair x2    SINUS SURGERY  11/07/2013    Sinus Surgery septoplasty x 2, nasal fracture x 2         Relevant Problems   Anesthesia (within normal limits)      Musculoskeletal   (+) Primary osteoarthritis of right hip      Other   (+) Arthritis of right knee       Clinical information reviewed:    Allergies  Meds     OB Status           NPO Detail:  NPO/Void Status  Date of Last Liquid: 12/21/23  Time of Last Liquid: 0430  Date of Last Solid: 12/20/23  Time of Last Solid: 2200         Physical Exam    Airway  Mallampati: I  TM distance: >3 FB  Neck ROM: full     Cardiovascular - normal exam     Dental - normal exam     Pulmonary - normal exam     Abdominal            Anesthesia Plan    ASA 3     spinal   (PREOP BLK)  The patient is not a current smoker.    Anesthetic plan and risks discussed with patient.    Plan discussed with CRNA and CAA.

## 2023-12-23 ENCOUNTER — HOME CARE VISIT (OUTPATIENT)
Dept: HOME HEALTH SERVICES | Facility: HOME HEALTH | Age: 63
End: 2023-12-23
Payer: COMMERCIAL

## 2023-12-23 VITALS
DIASTOLIC BLOOD PRESSURE: 60 MMHG | SYSTOLIC BLOOD PRESSURE: 110 MMHG | TEMPERATURE: 97.8 F | OXYGEN SATURATION: 93 % | HEART RATE: 102 BPM

## 2023-12-23 PROCEDURE — 0023 HH SOC

## 2023-12-23 PROCEDURE — G0151 HHCP-SERV OF PT,EA 15 MIN: HCPCS

## 2023-12-23 NOTE — HOME HEALTH
64 yo female post r jarrett by Dr Bishop on [id number].  Pt is 50 percent WB R le. Pt lives in [place] style home with steps to enter. Dtr and pts spouse supportive.  Pt requires rolling walker due to pain, swelling and wb restriction.  Pt instructed in posterior hip precautions today. Instructed pt in supine and standing home ex program for additional pain relief.  Currently using ice for pain.  States pain is severe at times and reports minimal relief with current meds.  Pt informed to visit er if pain worsens.  Md aware.  Pt instructed in posterior hip precautions today.  Having difficulty maintaining.  Requires cues not to cross legs. Pt and dtr instructed on positioning with pillows to avoid internal rotation and crossling of les. Pt instructed in bed mobility with leg .  Able to ambulate with rolling walker with just supervision.  Cues needed for foot flat gait.  Discussed PT at home 3 weeks then likely dc to outpatient care.  Pt agreeable to OT. PMHX sig for copd, ca larynx, carpal tunnel, migraine, r knee arthroscopy, cervical discectomy with fusion, rotator cuff repair, anxiety.

## 2023-12-24 ASSESSMENT — ENCOUNTER SYMPTOMS
LOWEST PAIN SEVERITY IN PAST 24 HOURS: 7/10
PAIN SEVERITY GOAL: 5/10
SUBJECTIVE PAIN PROGRESSION: UNCHANGED
HIGHEST PAIN SEVERITY IN PAST 24 HOURS: 9/10
MUSCLE WEAKNESS: 1
PAIN: 1
LIMITED RANGE OF MOTION: 1
TREMORS: 1

## 2023-12-24 ASSESSMENT — PAIN SCALES - PAIN ASSESSMENT IN ADVANCED DEMENTIA (PAINAD)
CONSOLABILITY: 0
BODYLANGUAGE: 0
CONSOLABILITY: 0 - NO NEED TO CONSOLE.
NEGVOCALIZATION: 0 - NONE.
NEGVOCALIZATION: 0
FACIALEXPRESSION: 0 - SMILING OR INEXPRESSIVE.
TOTALSCORE: 0
BREATHING: 0
FACIALEXPRESSION: 0
BODYLANGUAGE: 0 - RELAXED.

## 2023-12-24 ASSESSMENT — ACTIVITIES OF DAILY LIVING (ADL)
ENTERING_EXITING_HOME: SUPERVISION
AMBULATION ASSISTANCE: STAND BY ASSIST
AMBULATION ASSISTANCE: 1
AMBULATION_DISTANCE/DURATION_TOLERATED: 50 FT
OASIS_M1830: 03
AMBULATION ASSISTANCE ON FLAT SURFACES: 1

## 2023-12-25 ENCOUNTER — HOME CARE VISIT (OUTPATIENT)
Dept: HOME HEALTH SERVICES | Facility: HOME HEALTH | Age: 63
End: 2023-12-25
Payer: COMMERCIAL

## 2023-12-25 PROCEDURE — G0152 HHCP-SERV OF OT,EA 15 MIN: HCPCS

## 2023-12-26 ENCOUNTER — APPOINTMENT (OUTPATIENT)
Dept: ORTHOPEDIC SURGERY | Facility: CLINIC | Age: 63
End: 2023-12-26
Payer: COMMERCIAL

## 2023-12-26 VITALS
HEART RATE: 80 BPM | SYSTOLIC BLOOD PRESSURE: 114 MMHG | DIASTOLIC BLOOD PRESSURE: 62 MMHG | OXYGEN SATURATION: 95 % | TEMPERATURE: 97.5 F

## 2023-12-26 SDOH — ECONOMIC STABILITY: HOUSING INSECURITY: HOME SAFETY: HIP REPLACEMENT

## 2023-12-26 ASSESSMENT — ENCOUNTER SYMPTOMS
PAIN LOCATION - PAIN SEVERITY: 2/10
HIGHEST PAIN SEVERITY IN PAST 24 HOURS: 4/10
PERSON REPORTING PAIN: PATIENT
LOWEST PAIN SEVERITY IN PAST 24 HOURS: 2/10
PAIN SEVERITY GOAL: 2/10
PAIN LOCATION - PAIN QUALITY: ACHE
SUBJECTIVE PAIN PROGRESSION: UNCHANGED
PAIN: 1
PAIN LOCATION: RIGHT HIP

## 2023-12-26 ASSESSMENT — ACTIVITIES OF DAILY LIVING (ADL)
BATHING_CURRENT_FUNCTION: MINIMUM ASSIST
DRESSING_UB_CURRENT_FUNCTION: SUPERVISION
DRESSING_LB_CURRENT_FUNCTION: SUPERVISION
BATHING ASSESSED: 1

## 2023-12-26 ASSESSMENT — PAIN SCALES - GENERAL: PAINLEVEL_OUTOF10: 0 - NO PAIN

## 2023-12-27 ENCOUNTER — TELEPHONE (OUTPATIENT)
Dept: ORTHOPEDIC SURGERY | Facility: CLINIC | Age: 63
End: 2023-12-27
Payer: COMMERCIAL

## 2023-12-27 ENCOUNTER — HOME CARE VISIT (OUTPATIENT)
Dept: HOME HEALTH SERVICES | Facility: HOME HEALTH | Age: 63
End: 2023-12-27
Payer: COMMERCIAL

## 2023-12-27 PROCEDURE — G0151 HHCP-SERV OF PT,EA 15 MIN: HCPCS

## 2023-12-27 SDOH — HEALTH STABILITY: PHYSICAL HEALTH
EXERCISE COMMENTS: THA PROTOCOL SUPINE AND STANDING. NO MINI SQUATS OR TOE RAISES DUE TO WB RESTRICTIONS. DOING WELL WITH ALL EXERCISES. 10 TO 20 REPS 3 TIMES PER DAY.

## 2023-12-27 SDOH — ECONOMIC STABILITY: HOUSING INSECURITY

## 2023-12-27 SDOH — HEALTH STABILITY: PHYSICAL HEALTH: EXERCISE TYPE: SUPINE AND STANDING HEP

## 2023-12-27 ASSESSMENT — ENCOUNTER SYMPTOMS
PERSON REPORTING PAIN: PATIENT
PAIN LOCATION: RIGHT KNEE
PAIN: 1
LIMITED RANGE OF MOTION: 1
MUSCLE WEAKNESS: 1

## 2023-12-27 ASSESSMENT — ACTIVITIES OF DAILY LIVING (ADL): AMBULATION_DISTANCE/DURATION_TOLERATED: 50 FEET

## 2023-12-27 NOTE — TELEPHONE ENCOUNTER
I reviewed instructions with the patient and she verbalized understanding.  She will call back with any further questions.

## 2023-12-27 NOTE — TELEPHONE ENCOUNTER
Hilda called in today asking when she can switch the Tylenol to Ibuprofen.  She is no longer taking any other pain medication and states the ibuprofen helps better.  She was also asking about how long she should be at 50% weight bearing, and when can she drive?    She is S/P Right LUCIO done 12/21/2023.

## 2023-12-29 ENCOUNTER — HOME CARE VISIT (OUTPATIENT)
Dept: HOME HEALTH SERVICES | Facility: HOME HEALTH | Age: 63
End: 2023-12-29
Payer: COMMERCIAL

## 2023-12-29 PROCEDURE — G0151 HHCP-SERV OF PT,EA 15 MIN: HCPCS

## 2023-12-29 SDOH — ECONOMIC STABILITY: HOUSING INSECURITY: HOME SAFETY: HIP PRECAUTIONS

## 2024-01-01 DIAGNOSIS — G44.009 CLUSTER HEADACHE SYNDROME, UNSPECIFIED, NOT INTRACTABLE: ICD-10-CM

## 2024-01-02 PROCEDURE — G0180 MD CERTIFICATION HHA PATIENT: HCPCS | Performed by: ORTHOPAEDIC SURGERY

## 2024-01-03 ENCOUNTER — HOME CARE VISIT (OUTPATIENT)
Dept: HOME HEALTH SERVICES | Facility: HOME HEALTH | Age: 64
End: 2024-01-03
Payer: COMMERCIAL

## 2024-01-03 PROCEDURE — G0151 HHCP-SERV OF PT,EA 15 MIN: HCPCS

## 2024-01-03 SDOH — ECONOMIC STABILITY: HOUSING INSECURITY: HOME SAFETY: FALL PRECAUTIONS: 50% WEIGHTBEARING.

## 2024-01-03 ASSESSMENT — ENCOUNTER SYMPTOMS: PAIN: 1

## 2024-01-04 RX ORDER — PREGABALIN 75 MG/1
75 CAPSULE ORAL 3 TIMES DAILY
Qty: 90 CAPSULE | OUTPATIENT
Start: 2024-01-04

## 2024-01-05 DIAGNOSIS — M54.31 SCIATICA OF RIGHT SIDE: Primary | ICD-10-CM

## 2024-01-05 RX ORDER — PREGABALIN 75 MG/1
75 CAPSULE ORAL 2 TIMES DAILY
Qty: 60 CAPSULE | Refills: 0 | Status: SHIPPED | OUTPATIENT
Start: 2024-01-05 | End: 2024-01-10 | Stop reason: SDUPTHER

## 2024-01-05 NOTE — TELEPHONE ENCOUNTER
----- Message from Elizabeth Donahue, DO sent at 1/5/2024  4:15 PM EST -----  Can you call this patient and figure out what she is doing with her Lyrica? She hasn't filled it since October and that was for 90 pills    -dr turner

## 2024-01-05 NOTE — TELEPHONE ENCOUNTER
Patient states she has been taking Lyrica BID for several years and has her pain under control.  She only takes the third pill if she has had a difficult day.

## 2024-01-06 ENCOUNTER — HOME CARE VISIT (OUTPATIENT)
Dept: HOME HEALTH SERVICES | Facility: HOME HEALTH | Age: 64
End: 2024-01-06
Payer: COMMERCIAL

## 2024-01-09 ENCOUNTER — HOME CARE VISIT (OUTPATIENT)
Dept: HOME HEALTH SERVICES | Facility: HOME HEALTH | Age: 64
End: 2024-01-09
Payer: COMMERCIAL

## 2024-01-09 ENCOUNTER — OFFICE VISIT (OUTPATIENT)
Dept: ORTHOPEDIC SURGERY | Facility: CLINIC | Age: 64
End: 2024-01-09
Payer: COMMERCIAL

## 2024-01-09 VITALS — BODY MASS INDEX: 23.05 KG/M2 | WEIGHT: 135 LBS | HEIGHT: 64 IN

## 2024-01-09 DIAGNOSIS — M16.11 PRIMARY OSTEOARTHRITIS OF RIGHT HIP: ICD-10-CM

## 2024-01-09 PROCEDURE — 99024 POSTOP FOLLOW-UP VISIT: CPT | Performed by: ORTHOPAEDIC SURGERY

## 2024-01-09 PROCEDURE — 1036F TOBACCO NON-USER: CPT | Performed by: ORTHOPAEDIC SURGERY

## 2024-01-09 ASSESSMENT — ACTIVITIES OF DAILY LIVING (ADL)
HOME_HEALTH_OASIS: 00
OASIS_M1830: 01

## 2024-01-09 ASSESSMENT — PAIN - FUNCTIONAL ASSESSMENT: PAIN_FUNCTIONAL_ASSESSMENT: 0-10

## 2024-01-09 ASSESSMENT — PAIN SCALES - GENERAL: PAINLEVEL_OUTOF10: 1

## 2024-01-09 NOTE — PROGRESS NOTES
ORTHOPEDIC TOTAL JOINT  POST-OPERATIVE FOLLOW UP      ============================  IMPRESSION/PLAN:  ============================  63 y.o. female s/p Right Total Hip Replacement completed on 12/21/2023.    PLAN:  -Weightbearing: Weight bearing as tolerated  -DVT Prophylaxis: 1 more week of baby aspirin  -Radiology Studies: None today  -Therapies: Continue PT/OT, okay to transition outpatient physical therapy  -Transition off assistive device as seen fit by patient and therapy  -Follow-up: Follow-up in 6 weeks with x-rays        Radha Puri presents today for follow up of joint replacement above. Pain controlled with current treatment plan. Patient has begun physical therapy. They are currently doing therapy at home. They are currently ambulating with Walker.     Review of Systems:   Constitutional: See HPI for pain assessment, No significant weight loss, recent trauma. Denies fevers/chills  Cardiovascular: No chest pain, shortness of breath  Respiratory: No difficulty breathing, cough  Gastrointestinal: No nausea, vomiting, diarrhea, constipation  Musculoskeletal: Noted in HPI, no arthralgias   Integumentary: No rashes, easy bruising, redness   Neurological: no numbness or tingling in extremities, no gait disturbances     Patient Active Problem List   Diagnosis    Insomnia    Hyperglycemia    Elevated serum creatinine    Acute pain of left knee    Arthritis of right knee    Primary osteoarthritis of right hip       =================================  EXAM  =================================  GENERAL: A/Ox3, NAD. Appears healthy, well nourished  CARDIAC: regular rate  LUNGS: Breathing non-labored    MUSCULOSKELETAL:  Laterality: right Hip exam  - Strength: Abduction 5/5, Flexion 5/5  - Palpation: non TTP along surgical site  - Incision: No overlying, erythema, induration, or drainage. Incision healing well with no wound dehiscence  - EHL/PF/DF motor intact  - compartments soft, negative homans  - Gait: using  walker    NEUROVASCULAR:  - Neurovascular Status: sensation intact to light touch distally  - Capillary refill brisk at extremities, Bilateral dorsalis pedis pulse 2+     IMAGING: None today      Gonzalo Bishop DO  Attending Surgeon  Joint Replacement and Adult Reconstructive Surgery  [place], [place]

## 2024-01-10 ENCOUNTER — PATIENT MESSAGE (OUTPATIENT)
Dept: PRIMARY CARE | Facility: CLINIC | Age: 64
End: 2024-01-10

## 2024-01-10 DIAGNOSIS — M54.31 SCIATICA OF RIGHT SIDE: ICD-10-CM

## 2024-01-10 RX ORDER — PREGABALIN 75 MG/1
75 CAPSULE ORAL 2 TIMES DAILY
Qty: 60 CAPSULE | Refills: 0 | Status: SHIPPED | OUTPATIENT
Start: 2024-01-10 | End: 2024-01-17 | Stop reason: SDUPTHER

## 2024-01-15 ENCOUNTER — TELEPHONE (OUTPATIENT)
Dept: ORTHOPEDIC SURGERY | Facility: CLINIC | Age: 64
End: 2024-01-15

## 2024-01-15 NOTE — TELEPHONE ENCOUNTER
"Patient had R LUCIO 12/21/23. She states on Saturday 1/13/24 she did a lot of walking on cement, yesterday her incision was on fire, swollen and had some redness. She states it looked like a hematoma forming. Today she states that the swelling went down, and not as red or on fire, just her incision is \"lumpy\" and it wasn't like that before. She states she was doing great up until  Saturday night. No Fevers, and no discharge coming from incision.   "

## 2024-01-16 ENCOUNTER — APPOINTMENT (OUTPATIENT)
Dept: ORTHOPEDIC SURGERY | Facility: CLINIC | Age: 64
End: 2024-01-16
Payer: COMMERCIAL

## 2024-01-17 ENCOUNTER — OFFICE VISIT (OUTPATIENT)
Dept: PRIMARY CARE | Facility: CLINIC | Age: 64
End: 2024-01-17
Payer: COMMERCIAL

## 2024-01-17 VITALS
HEIGHT: 63 IN | SYSTOLIC BLOOD PRESSURE: 110 MMHG | OXYGEN SATURATION: 94 % | DIASTOLIC BLOOD PRESSURE: 70 MMHG | BODY MASS INDEX: 24.41 KG/M2 | HEART RATE: 83 BPM | TEMPERATURE: 97.7 F | WEIGHT: 137.8 LBS

## 2024-01-17 DIAGNOSIS — G47.00 INSOMNIA, UNSPECIFIED TYPE: ICD-10-CM

## 2024-01-17 DIAGNOSIS — K21.9 GASTROESOPHAGEAL REFLUX DISEASE, UNSPECIFIED WHETHER ESOPHAGITIS PRESENT: ICD-10-CM

## 2024-01-17 DIAGNOSIS — M54.31 SCIATICA OF RIGHT SIDE: ICD-10-CM

## 2024-01-17 DIAGNOSIS — R68.2 DRY MOUTH: Primary | ICD-10-CM

## 2024-01-17 DIAGNOSIS — M16.11 PRIMARY OSTEOARTHRITIS OF RIGHT HIP: ICD-10-CM

## 2024-01-17 PROCEDURE — 1036F TOBACCO NON-USER: CPT | Performed by: STUDENT IN AN ORGANIZED HEALTH CARE EDUCATION/TRAINING PROGRAM

## 2024-01-17 PROCEDURE — 99214 OFFICE O/P EST MOD 30 MIN: CPT | Performed by: STUDENT IN AN ORGANIZED HEALTH CARE EDUCATION/TRAINING PROGRAM

## 2024-01-17 RX ORDER — CEVIMELINE HYDROCHLORIDE 30 MG/1
30 CAPSULE ORAL 3 TIMES DAILY
Qty: 90 CAPSULE | Refills: 1 | Status: SHIPPED | OUTPATIENT
Start: 2024-01-17

## 2024-01-17 RX ORDER — OMEPRAZOLE 20 MG/1
20 CAPSULE, DELAYED RELEASE ORAL
Qty: 90 CAPSULE | Refills: 3 | Status: SHIPPED | OUTPATIENT
Start: 2024-01-17

## 2024-01-17 RX ORDER — ZOLPIDEM TARTRATE 5 MG/1
5 TABLET ORAL NIGHTLY PRN
Qty: 30 TABLET | Refills: 2 | Status: SHIPPED | OUTPATIENT
Start: 2024-01-17 | End: 2024-04-23 | Stop reason: SDUPTHER

## 2024-01-17 RX ORDER — PREGABALIN 75 MG/1
75 CAPSULE ORAL 2 TIMES DAILY
Qty: 180 CAPSULE | Refills: 1 | Status: SHIPPED | OUTPATIENT
Start: 2024-01-17 | End: 2024-07-15

## 2024-01-17 RX ORDER — CYCLOBENZAPRINE HCL 10 MG
10 TABLET ORAL 3 TIMES DAILY PRN
Qty: 30 TABLET | Refills: 2 | Status: SHIPPED | OUTPATIENT
Start: 2024-01-17

## 2024-01-17 ASSESSMENT — PATIENT HEALTH QUESTIONNAIRE - PHQ9
2. FEELING DOWN, DEPRESSED OR HOPELESS: NOT AT ALL
SUM OF ALL RESPONSES TO PHQ9 QUESTIONS 1 AND 2: 0
1. LITTLE INTEREST OR PLEASURE IN DOING THINGS: NOT AT ALL

## 2024-01-17 ASSESSMENT — ENCOUNTER SYMPTOMS
DYSPHORIC MOOD: 0
CONSTIPATION: 0
SHORTNESS OF BREATH: 0
HEMATURIA: 0
HEADACHES: 0
NERVOUS/ANXIOUS: 0
DIZZINESS: 0
PALPITATIONS: 0
COUGH: 0
WHEEZING: 0
FEVER: 0
NAUSEA: 0
FREQUENCY: 0
ABDOMINAL PAIN: 0
CHILLS: 0
DYSURIA: 0
NUMBNESS: 0
DIARRHEA: 0
FATIGUE: 0

## 2024-01-17 NOTE — PROGRESS NOTES
"Subjective   Patient ID: Radha Puri \"Hilda\" is a 63 y.o. female who presents for No chief complaint on file..    HPI   Patient here for medication refills. Patient on lyrica for neuropathy. Tried gabapentin but had side effects and tolerates lyrica better. Gabapentin didn't work either. Nothing worked until she had the nerve stimulator and then lyrica worked with that.    Ambien, she takes nightly. Has tried melatonin.  Would be willing to try something different in the future.    No side effects with meds.    Just had hip replacement about a month ago and is doing well.  She is on the cane and really only uses it when she will be out and around    OARRS:  Elizabeth Donahue, DO on 1/17/2024  7:40 AM  I have personally reviewed the OARRS report for Radha Puri. I have considered the risks of abuse, dependence, addiction and diversion    Is the patient prescribed a combination of a benzodiazepine and opioid?  No    Last Urine Drug Screen / ordered today: Yes  Recent Results (from the past 8760 hour(s))   OPIATE/OPIOID/BENZO PRESCRIPTION COMPLIANCE    Collection Time: 07/19/23  8:08 AM   Result Value Ref Range    DRUG SCREEN COMMENT URINE SEE BELOW     Creatine, Urine 122.7 mg/dL    Amphetamine Screen, Urine PRESUMPTIVE NEGATIVE NEGATIVE    Barbiturate Screen, Urine PRESUMPTIVE NEGATIVE NEGATIVE    Cannabinoid Screen, Urine PRESUMPTIVE NEGATIVE NEGATIVE    Cocaine Screen, Urine PRESUMPTIVE NEGATIVE NEGATIVE    PCP Screen, Urine PRESUMPTIVE NEGATIVE NEGATIVE    7-Aminoclonazepam <25 Cutoff <25 ng/mL    Alpha-Hydroxyalprazolam <25 Cutoff <25 ng/mL    Alpha-Hydroxymidazolam <25 Cutoff <25 ng/mL    Alprazolam <25 Cutoff <25 ng/mL    Chlordiazepoxide <25 Cutoff <25 ng/mL    Clonazepam <25 Cutoff <25 ng/mL    Diazepam <25 Cutoff <25 ng/mL    Lorazepam <25 Cutoff <25 ng/mL    Midazolam <25 Cutoff <25 ng/mL    Nordiazepam <25 Cutoff <25 ng/mL    Oxazepam <25 Cutoff <25 ng/mL    Temazepam <25 Cutoff <25 ng/mL    " Zolpidem 25 (A) Cutoff <25 ng/mL    Zolpidem Metabolite (ZCA) >1000 (A) Cutoff <25 ng/mL    6-Acetylmorphine <25 Cutoff <25 ng/mL    Codeine <50 Cutoff <50 ng/mL    Hydrocodone <25 Cutoff <25 ng/mL    Hydromorphone <25 Cutoff <25 ng/mL    Morphine Urine <50 Cutoff <50 ng/mL    Norhydrocodone <25 Cutoff <25 ng/mL    Noroxycodone <25 Cutoff <25 ng/mL    Oxycodone <25 Cutoff <25 ng/mL    Oxymorphone <25 Cutoff <25 ng/mL    Tramadol <50 Cutoff <50 ng/mL    O-Desmethyltramadol <50 Cutoff <50 ng/mL    Fentanyl <2.5 Cutoff<2.5 ng/mL    Norfentanyl <2.5 Cutoff<2.5 ng/mL    METHADONE CONFIRMATION,URINE <25 Cutoff <25 ng/mL    EDDP <25 Cutoff <25 ng/mL     Results are as expected.         Controlled Substance Agreement:  Date of the Last Agreement: 7/19/2023, 1/17/2024  Reviewed Controlled Substance Agreement including but not limited to the benefits, risks, and alternatives to treatment with a Controlled Substance medication(s).    Sleep Aids:   What is the patient's goal of therapy?  Improved sleep  Is this being achieved with current treatment?  Yes    Activities of Daily Living:   Is your overall impression that this patient is benefiting (symptom reduction outweighs side effects) from sleep aid therapy? Yes     1. Physical Functioning: Better  2. Family Relationship: Same  3. Social Relationship: Same  4. Mood: Same  5. Sleep Patterns: Better  6. Overall Function: Better       and Lyrica:  What is the patient's goal of therapy?  Decrease pain and neuropathy  Is this being achieved with current treatment?  Yes    Pain Assessment:  No data recorded    Activities of Daily Living:  Is your overall impression that this patient is benefiting (symptom reduction outweighs side effects) from Lyrica therapy? Yes     1. Physical Functioning: Better  2. Family Relationship: Same  3. Social Relationship: Same  4. Mood: Better  5. Sleep Patterns: Better  6. Overall Function: Better      Review of Systems   Constitutional:  Negative  "for chills, fatigue and fever.   Respiratory:  Negative for cough, shortness of breath and wheezing.    Cardiovascular:  Negative for chest pain, palpitations and leg swelling.   Gastrointestinal:  Negative for abdominal pain, constipation, diarrhea and nausea.   Genitourinary:  Negative for dysuria, frequency, hematuria and urgency.   Neurological:  Negative for dizziness, numbness and headaches.   Psychiatric/Behavioral:  Negative for dysphoric mood. The patient is not nervous/anxious.        Objective   /70 (BP Location: Left arm, Patient Position: Sitting, BP Cuff Size: Adult)   Pulse 83   Temp 36.5 °C (97.7 °F) (Temporal)   Ht 1.59 m (5' 2.6\")   Wt 62.5 kg (137 lb 12.8 oz)   SpO2 94%   BMI 24.72 kg/m²     Physical Exam  Constitutional:       Appearance: Normal appearance.   HENT:      Head: Normocephalic and atraumatic.   Eyes:      Extraocular Movements: Extraocular movements intact.      Pupils: Pupils are equal, round, and reactive to light.   Cardiovascular:      Rate and Rhythm: Normal rate and regular rhythm.      Heart sounds: Normal heart sounds. No murmur heard.  Pulmonary:      Effort: Pulmonary effort is normal.      Breath sounds: Normal breath sounds. No wheezing.   Abdominal:      General: Bowel sounds are normal.      Palpations: Abdomen is soft.      Tenderness: There is no abdominal tenderness. There is no guarding.   Musculoskeletal:         General: Normal range of motion.   Skin:     General: Skin is warm and dry.   Neurological:      General: No focal deficit present.      Mental Status: She is alert and oriented to person, place, and time.   Psychiatric:         Mood and Affect: Mood normal.         Behavior: Behavior normal.         Assessment/Plan   Problem List Items Addressed This Visit       Insomnia    Relevant Medications    zolpidem (Ambien) 5 mg tablet    Primary osteoarthritis of right hip    Relevant Medications    cyclobenzaprine (Flexeril) 10 mg tablet     Other " Visit Diagnoses       Dry mouth    -  Primary    Relevant Medications    cevimeline (Evoxac) 30 mg capsule    Sciatica of right side        Relevant Medications    pregabalin (Lyrica) 75 mg capsule    Gastroesophageal reflux disease, unspecified whether esophagitis present        Relevant Medications    omeprazole (PriLOSEC) 20 mg DR capsule          Patient doing well overall.  Will continue her current medications.  She has been on Lyrica for a while which is controlling her neuropathy.  She does also have a spinal stimulator and I do feel it is appropriate to continue with this medication.    She is well-controlled on Ambien right now.  We did have a discussion that at some point we will need to switch her to a different medication.    I personally have reviewed the OARRS report for this patient.  This report is scanned into the electronic medical record.  I have considered the risks of abuse, dependence, addiction and diversion.  I believe that it is clinically appropriate for the patient to be prescribed this medication.  Urine drug screen and drug contract either pending or up-to-date.    Will see her back in 6 months for a physical with fasting blood work prior       Patient understands and agrees with treatment plan    Elizabeth Donahue, DO   01/17/24

## 2024-02-20 ENCOUNTER — OFFICE VISIT (OUTPATIENT)
Dept: ORTHOPEDIC SURGERY | Facility: CLINIC | Age: 64
End: 2024-02-20
Payer: COMMERCIAL

## 2024-02-20 ENCOUNTER — HOSPITAL ENCOUNTER (OUTPATIENT)
Dept: RADIOLOGY | Facility: CLINIC | Age: 64
Discharge: HOME | End: 2024-02-20
Payer: COMMERCIAL

## 2024-02-20 VITALS — BODY MASS INDEX: 24.27 KG/M2 | HEIGHT: 63 IN | WEIGHT: 137 LBS

## 2024-02-20 DIAGNOSIS — M16.11 PRIMARY OSTEOARTHRITIS OF RIGHT HIP: ICD-10-CM

## 2024-02-20 PROCEDURE — 73502 X-RAY EXAM HIP UNI 2-3 VIEWS: CPT | Mod: RT

## 2024-02-20 PROCEDURE — 99024 POSTOP FOLLOW-UP VISIT: CPT | Performed by: ORTHOPAEDIC SURGERY

## 2024-02-20 PROCEDURE — 73502 X-RAY EXAM HIP UNI 2-3 VIEWS: CPT | Mod: RIGHT SIDE | Performed by: RADIOLOGY

## 2024-02-20 PROCEDURE — 1036F TOBACCO NON-USER: CPT | Performed by: ORTHOPAEDIC SURGERY

## 2024-02-20 ASSESSMENT — PAIN - FUNCTIONAL ASSESSMENT: PAIN_FUNCTIONAL_ASSESSMENT: NO/DENIES PAIN

## 2024-02-20 NOTE — PROGRESS NOTES
ORTHOPEDIC TOTAL JOINT  POST-OPERATIVE FOLLOW UP      ============================  IMPRESSION/PLAN:  ============================  63 y.o. female s/p Right Total Hip Replacement completed on 12/21/2023.    PLAN:  -Weightbearing: Weight bearing as tolerated  -DVT Prophylaxis: no longer needed  -Radiology Studies: X-rays from today reviewed the patient demonstrating stable implants  -Therapies: Continue regular exercise program  -Follow-up at the 1 year ashvin of surgery with new x-rays        Radha Puri presents today for follow up of joint replacement above. Pain controlled with current treatment plan. Patient has begun physical therapy. They are currently doing therapy at [facility] in [place]. Her last session is next week.  They are currently ambulating with no assisted devices.     Review of Systems:   Constitutional: See HPI for pain assessment, No significant weight loss, recent trauma. Denies fevers/chills  Cardiovascular: No chest pain, shortness of breath  Respiratory: No difficulty breathing, cough  Gastrointestinal: No nausea, vomiting, diarrhea, constipation  Musculoskeletal: Noted in HPI, no arthralgias   Integumentary: No rashes, easy bruising, redness   Neurological: no numbness or tingling in extremities, no gait disturbances     Patient Active Problem List   Diagnosis    Insomnia    Hyperglycemia    Elevated serum creatinine    Acute pain of left knee    Arthritis of right knee    Primary osteoarthritis of right hip       =================================  EXAM  =================================  GENERAL: A/Ox3, NAD. Appears healthy, well nourished  CARDIAC: regular rate  LUNGS: Breathing non-labored    MUSCULOSKELETAL:  Laterality: right Hip exam  - Strength: Abduction 5/5, Flexion 5/5  - Palpation: non TTP along surgical site  - Incision: Well-healed  - EHL/PF/DF motor intact  - compartments soft, negative homans  - Gait: using no assistive device    NEUROVASCULAR:  - Neurovascular Status:  sensation intact to light touch distally  - Capillary refill brisk at extremities, Bilateral dorsalis pedis pulse 2+     IMAGING: X-rays of the right hip and pelvis reviewed which demonstrates no signs of loosening failure right total hip arthroplasty. X-rays were personally reviewed by me.  Radiology reports were reviewed by me as well, if available at the time.        Gonzalo Bishop DO  Attending Surgeon  Joint Replacement and Adult Reconstructive Surgery  [place], [place]

## 2024-02-28 DIAGNOSIS — M54.31 SCIATICA OF RIGHT SIDE: ICD-10-CM

## 2024-02-28 RX ORDER — IBUPROFEN 800 MG/1
800 TABLET ORAL 3 TIMES DAILY PRN
Qty: 90 TABLET | Refills: 5 | Status: SHIPPED | OUTPATIENT
Start: 2024-02-28 | End: 2025-02-27

## 2024-03-01 DIAGNOSIS — G44.009 MIGRAINE-CLUSTER HEADACHE SYNDROME: ICD-10-CM

## 2024-03-01 RX ORDER — ELETRIPTAN HYDROBROMIDE 40 MG/1
40 TABLET, FILM COATED ORAL ONCE AS NEEDED
Qty: 6 TABLET | Refills: 5 | Status: SHIPPED | OUTPATIENT
Start: 2024-03-01

## 2024-03-18 DIAGNOSIS — J45.40 MODERATE PERSISTENT ASTHMA WITHOUT COMPLICATION (HHS-HCC): ICD-10-CM

## 2024-03-18 RX ORDER — ALBUTEROL SULFATE 90 UG/1
AEROSOL, METERED RESPIRATORY (INHALATION)
Qty: 17 G | Refills: 4 | Status: SHIPPED | OUTPATIENT
Start: 2024-03-18

## 2024-03-19 ENCOUNTER — TELEPHONE (OUTPATIENT)
Dept: PRIMARY CARE | Facility: CLINIC | Age: 64
End: 2024-03-19
Payer: COMMERCIAL

## 2024-03-19 DIAGNOSIS — M16.11 PRIMARY OSTEOARTHRITIS OF RIGHT HIP: Primary | ICD-10-CM

## 2024-03-19 NOTE — TELEPHONE ENCOUNTER
----- Message from Radha Puri sent at 3/19/2024 10:32 AM EDT -----  Regarding: Marisela   Contact: [phone]  Ramos MURPHY  Did Dr Donahue have a chance to get all her messages? I am wondering if she wrote the script for my placard?  Hilda Puri  [birth year]  [phone]

## 2024-03-19 NOTE — TELEPHONE ENCOUNTER
Sent Yillio message letting pt know script is printed out and placed up front for her to  at her convenience.

## 2024-04-23 DIAGNOSIS — G47.00 INSOMNIA, UNSPECIFIED TYPE: ICD-10-CM

## 2024-04-23 RX ORDER — ZOLPIDEM TARTRATE 5 MG/1
5 TABLET ORAL NIGHTLY PRN
Qty: 30 TABLET | Refills: 2 | Status: SHIPPED | OUTPATIENT
Start: 2024-04-23 | End: 2024-07-22

## 2024-05-01 ENCOUNTER — TELEPHONE (OUTPATIENT)
Dept: ORTHOPEDIC SURGERY | Facility: CLINIC | Age: 64
End: 2024-05-01
Payer: COMMERCIAL

## 2024-05-01 DIAGNOSIS — S76.311A HAMSTRING MUSCLE STRAIN, RIGHT, INITIAL ENCOUNTER: ICD-10-CM

## 2024-05-01 NOTE — TELEPHONE ENCOUNTER
Patient called in stating she had RTHA done 12/22/23 and states that she hurt her right hamstring area and would like to know if we can order PT? I advised patient we might need to see her for tis problem first but she requested I asked first. Please advise.

## 2024-05-03 ENCOUNTER — TELEPHONE (OUTPATIENT)
Dept: PRIMARY CARE | Facility: CLINIC | Age: 64
End: 2024-05-03
Payer: COMMERCIAL

## 2024-05-03 DIAGNOSIS — M79.641 PAIN IN BOTH HANDS: ICD-10-CM

## 2024-05-03 DIAGNOSIS — M79.642 PAIN IN BOTH HANDS: ICD-10-CM

## 2024-05-03 NOTE — TELEPHONE ENCOUNTER
Pt left a msg stating that she had to bump her physical from July to Aug.  She said that you wanted her to do fasting blood work prior to this appt.  I did read that in your last OVN.  I also reminded her that she will need to complete a CSA and UDA.  She will be trying to get the blood work done at the end of July between her trips out of town.

## 2024-05-08 ENCOUNTER — HOSPITAL ENCOUNTER (OUTPATIENT)
Dept: RADIOLOGY | Facility: CLINIC | Age: 64
Discharge: HOME | End: 2024-05-08
Payer: COMMERCIAL

## 2024-05-08 ENCOUNTER — OFFICE VISIT (OUTPATIENT)
Dept: ORTHOPEDIC SURGERY | Facility: CLINIC | Age: 64
End: 2024-05-08
Payer: COMMERCIAL

## 2024-05-08 VITALS — WEIGHT: 137 LBS | HEIGHT: 63 IN | BODY MASS INDEX: 24.27 KG/M2

## 2024-05-08 DIAGNOSIS — M79.642 PAIN IN BOTH HANDS: ICD-10-CM

## 2024-05-08 DIAGNOSIS — M79.641 PAIN IN BOTH HANDS: ICD-10-CM

## 2024-05-08 DIAGNOSIS — M19.031 CMC DJD(CARPOMETACARPAL DEGENERATIVE JOINT DISEASE), LOCALIZED PRIMARY, RIGHT: ICD-10-CM

## 2024-05-08 DIAGNOSIS — M19.032 CMC DJD(CARPOMETACARPAL DEGENERATIVE JOINT DISEASE), LOCALIZED PRIMARY, LEFT: Primary | ICD-10-CM

## 2024-05-08 PROCEDURE — 73130 X-RAY EXAM OF HAND: CPT | Mod: 50

## 2024-05-08 PROCEDURE — 1036F TOBACCO NON-USER: CPT | Performed by: ORTHOPAEDIC SURGERY

## 2024-05-08 PROCEDURE — 73130 X-RAY EXAM OF HAND: CPT | Mod: BILATERAL PROCEDURE | Performed by: INTERNAL MEDICINE

## 2024-05-08 PROCEDURE — 99214 OFFICE O/P EST MOD 30 MIN: CPT | Performed by: ORTHOPAEDIC SURGERY

## 2024-05-08 PROCEDURE — 20600 DRAIN/INJ JOINT/BURSA W/O US: CPT | Performed by: ORTHOPAEDIC SURGERY

## 2024-05-08 RX ORDER — LIDOCAINE HYDROCHLORIDE 10 MG/ML
1 INJECTION INFILTRATION; PERINEURAL
Status: COMPLETED | OUTPATIENT
Start: 2024-05-08 | End: 2024-05-08

## 2024-05-08 RX ORDER — TRIAMCINOLONE ACETONIDE 40 MG/ML
40 INJECTION, SUSPENSION INTRA-ARTICULAR; INTRAMUSCULAR
Status: COMPLETED | OUTPATIENT
Start: 2024-05-08 | End: 2024-05-08

## 2024-05-08 RX ADMIN — TRIAMCINOLONE ACETONIDE 40 MG: 40 INJECTION, SUSPENSION INTRA-ARTICULAR; INTRAMUSCULAR at 09:01

## 2024-05-08 RX ADMIN — LIDOCAINE HYDROCHLORIDE 1 ML: 10 INJECTION INFILTRATION; PERINEURAL at 09:01

## 2024-05-08 NOTE — PROGRESS NOTES
63 y.o. female presents today for evaluation of bilateral base of thumb pain. The patient reports symptoms for months, getting worse recently. Pain is controlled. Patient reports no numbness and tingling.  Reports no previous surgeries, injections, or trauma to the area.  Reports pain worse with use, better at rest.   Pain dull ache, sharp at times.   Pain worse opening bottles and jars.  Right worse than left.  Does report cutting her thumb a couple weeks ago on the left but doing well.    Review of Systems    Constitutional: see HPI, no fever, no chills, not feeling tired, no significant weight gain or weight loss.   Eyes: No vision changes  ENT: no nosebleeds.   Cardiovascular: no chest pain.   Respiratory: no shortness of breath and no cough.   Gastrointestinal: no abdominal pain, no nausea, no vomiting and no diarrhea.   Musculoskeletal: per HPI  Neurological: no headache, no gait disturbances  Psychiatric: no depression and no sleep disturbances.   Endocrine: no muscle weakness and no muscle cramps.   Hematologic/Lymphatic: no swollen glands and no tendency for easy bruising or excessive swelling.     Patient's past medical history, past surgical history, allergies, and medications have been reviewed unless otherwise noted in the chart.     CMC Arthritis Exam  Inspection:  no evidence of infection, no edema, no erythema, no ecchymosis, Palpation:  compartments are soft, pain with palpation over the Thumb CMC joint, Range of Motion:  full wrist and finger range of motion, Stability:  no wrist or finger instability detected, Strength:  5/5  and pinch strength, Skin:  intact, Vascular:  capillary refill <2 seconds distally, Sensation:  intact to light touch distally, Tests:  negative Finkelstein's , positive Thumb CMC Grind.      Constitutional   General appearance: Alert and in no acute distress. Well developed, well nourished.    Eyes   External Eye, Conjunctiva and lids: Normal external exam - pupils were  equal in size, round, reactive to light (PERRL) with normal accommodation and extraocular movements intact (EOMI).   Ears, Nose, Mouth, and Throat   Hearing: Normal.   Neck   Neck: No neck mass was observed. Supple.   Pulmonary   Respiratory effort: No respiratory distress.   Cardiovascular   Examination of extremities: No peripheral edema.   Psychiatric   Judgment and insight: Intact.   Orientation to person, place, and time: Alert and oriented x 3.       Mood and affect: Normal.      X-rays of the bilateral hands show moderate CMC arthritis    Bilateral thumb CMC arthritis  Based on the history, physical exam and imaging studies above, the patient's presentation is consistent with the above diagnosis.  I had a long discussion with the patient regarding their presentation and the treatment options.  We discussed initial nonoperative versus operative management options as well as potential further diagnostic imaging.  We again discussed her treatment options going forward along with their associated risks and benefits. After thorough discussion, the patient has elected to proceed with conservative management. All questions were answered to the patients satisfaction who seems satisfied with the plan.  They will call the office with any questions/concerns.    Discussion I discussed the diagnosis and treatment options with the patient today along with their associated risks and benefits. After thorough discussion, the patient has elected to proceed with a corticosteroid injection with Kenalog and Xylocaine, which was performed in the office today under aseptic technique and the patient tolerated the procedure well.          Ortho Injections:           Injection site bilateral thumb CMC. Medication 1 cc 1% Xylocaine, 1 cc 40 mg Kenalog, Injection given under sterile conditions. No immediate complications noted. Post injection instructions given.  Comfort Cool as needed  Over-the-counter anti-inflammatories as  "needed  Follow-up 4 months as needed no x-ray    Patient ID: Radha Puri \"Hilda\" is a 63 y.o. female.    S Inj/Asp: bilateral thumb CMC on 5/8/2024 9:01 AM  Indications: pain  Details: 25 G needle (dorsoradial) approach  Medications (Right): 40 mg triamcinolone acetonide 40 mg/mL; 1 mL lidocaine 10 mg/mL (1 %)  Medications (Left): 40 mg triamcinolone acetonide 40 mg/mL; 1 mL lidocaine 10 mg/mL (1 %)  Outcome: tolerated well, no immediate complications  Procedure, treatment alternatives, risks and benefits explained, specific risks discussed. Consent was given by the patient. Immediately prior to procedure a time out was called to verify the correct patient, procedure, equipment, support staff and site/side marked as required. Patient was prepped and draped in the usual sterile fashion.           "

## 2024-05-08 NOTE — PROGRESS NOTES
Bilateral hand pain  Left thumb- cut into a tendon a couple weeks ago  Right hand - has lump at base of thumb and wrist. Has swelling but goes does when she has a brace.   RHD   Xr done today

## 2024-05-20 ENCOUNTER — APPOINTMENT (OUTPATIENT)
Dept: ORTHOPEDIC SURGERY | Facility: CLINIC | Age: 64
End: 2024-05-20
Payer: COMMERCIAL

## 2024-06-19 ENCOUNTER — APPOINTMENT (OUTPATIENT)
Dept: ORTHOPEDIC SURGERY | Facility: CLINIC | Age: 64
End: 2024-06-19
Payer: COMMERCIAL

## 2024-06-19 ENCOUNTER — HOSPITAL ENCOUNTER (OUTPATIENT)
Dept: RADIOLOGY | Facility: CLINIC | Age: 64
Discharge: HOME | End: 2024-06-19
Payer: COMMERCIAL

## 2024-06-19 VITALS — WEIGHT: 137 LBS | BODY MASS INDEX: 24.27 KG/M2 | HEIGHT: 63 IN

## 2024-06-19 DIAGNOSIS — M17.12 PRIMARY OSTEOARTHRITIS OF LEFT KNEE: Primary | ICD-10-CM

## 2024-06-19 DIAGNOSIS — M25.562 LEFT KNEE PAIN, UNSPECIFIED CHRONICITY: ICD-10-CM

## 2024-06-19 PROCEDURE — 73564 X-RAY EXAM KNEE 4 OR MORE: CPT | Mod: LT

## 2024-06-19 PROCEDURE — 20610 DRAIN/INJ JOINT/BURSA W/O US: CPT | Performed by: STUDENT IN AN ORGANIZED HEALTH CARE EDUCATION/TRAINING PROGRAM

## 2024-06-19 PROCEDURE — 1036F TOBACCO NON-USER: CPT | Performed by: STUDENT IN AN ORGANIZED HEALTH CARE EDUCATION/TRAINING PROGRAM

## 2024-06-19 PROCEDURE — 99214 OFFICE O/P EST MOD 30 MIN: CPT | Performed by: STUDENT IN AN ORGANIZED HEALTH CARE EDUCATION/TRAINING PROGRAM

## 2024-06-19 PROCEDURE — 73564 X-RAY EXAM KNEE 4 OR MORE: CPT | Mod: LEFT SIDE | Performed by: RADIOLOGY

## 2024-06-19 RX ORDER — TRIAMCINOLONE ACETONIDE 40 MG/ML
40 INJECTION, SUSPENSION INTRA-ARTICULAR; INTRAMUSCULAR
Status: COMPLETED | OUTPATIENT
Start: 2024-06-19 | End: 2024-06-19

## 2024-06-19 RX ORDER — LIDOCAINE HYDROCHLORIDE 10 MG/ML
2 INJECTION INFILTRATION; PERINEURAL
Status: COMPLETED | OUTPATIENT
Start: 2024-06-19 | End: 2024-06-19

## 2024-06-19 NOTE — PROGRESS NOTES
PRIMARY CARE PHYSICIAN: Elizabeth Donahue DO  REFERRING PROVIDER: No referring provider defined for this encounter.     CONSULT ORTHOPAEDIC: Knee Evaluation    ASSESSMENT & PLAN    Impression: 63 y.o. female with Left knee pain consistent with early osteoarthritis.    Plan:   I explained to the patient the nature of their diagnosis.  I reviewed their imaging studies with them.    Based on the history, physical exam and imaging studies above, the patient's presentation is consistent with the above diagnosis.  I had a long discussion with the patient regarding their presentation and the treatment options.  We discussed initial nonoperative versus operative management options as well as potential further diagnostic imaging.  I reviewed the patient's x-ray findings with her.  Her findings are most consistent with early osteoarthritis and some degenerative meniscus tearing.  She has an upcoming trip and I believe would benefit from a corticosteroid injection.  She was provided with a corticosteroid injection today as described below.  She tolerated this well.  She will focus on low impact activities and continue to maintain her quadricep strength and hamstring flexibility at PT.    Follow-Up: Patient will follow-up as needed    At the end of the visit, all questions were answered in full. The patient is in agreement with the plan and recommendations. They will call the office with any questions/concerns.    Note dictated with GameTube software. Completed without full typed error editing and sent to avoid delay.       SUBJECTIVE  CHIEF COMPLAINT:   Chief Complaint   Patient presents with    Left Knee - Pain        HPI: Radha Puri is a 63 y.o. patient who presents today with left knee pain. States she has a partial hamstring tear that she found out about a couple months ago, caused her to put more strain on her knee.  She continues to have pain and dysfunction in the left knee.  She feels like  she may have tore something.  She has pain persistently in the knee with associated swelling.  She has been going to physical therapy.  She has an upcoming trip and is requesting a corticosteroid injection.    They deny any constant or progressive numbness or tingling in their legs.     REVIEW OF SYSTEMS  Constitutional: See HPI for pain assessment, No significant weight loss, recent trauma  Cardiovascular: No chest pain, shortness of breath  Respiratory: No difficulty breathing, cough  Gastrointestinal: No nausea, vomiting, diarrhea, constipation  Musculoskeletal: Noted in HPI, positive for pain, restricted motion, stiffness  Integumentary: No rashes, easy bruising, redness   Neurological: no numbness or tingling in extremities, no gait disturbances   Psychiatric: No mood changes, memory changes, social issues  Heme/Lymph: no excessive swelling, easy bruising, excessive bleeding  ENT: No hearing changes  Eyes: No vision changes    Past Medical History:   Diagnosis Date    Acute bronchitis due to other specified organisms 08/06/2019    Acute bacterial bronchitis    Atelectasis 06/24/2013    Atelectasis    Carpal tunnel syndrome, unspecified upper limb 06/24/2013    Carpal tunnel syndrome    Chest pain, unspecified 04/02/2013    Chest pain    Chronic obstructive pulmonary disease, unspecified (Multi)     Chronic obstructive pulmonary disease    Cutaneous abscess of face 04/02/2013    Abscess of face    Diverticulosis     Enthesopathy, unspecified 04/02/2013    Tendonitis    Erysipelas 04/02/2013    Erysipelas    Gastro-esophageal reflux disease with esophagitis, without bleeding 08/14/2014    Gastro-esophageal reflux disease with esophagitis    GERD (gastroesophageal reflux disease)     Hard to intubate     Headache, unspecified 04/02/2013    Headache    Larynx cancer (Multi)     surgery to remove only    Migraine, unspecified, not intractable, without status migrainosus 11/28/2018    Headache, migraine    Occipital  neuralgia     Other conditions influencing health status 06/24/2013    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    Pain in leg, unspecified 06/24/2013    Lower extremity pain    Personal history of malignant neoplasm of unspecified site of lip, oral cavity, and pharynx 08/14/2014    History of malignant neoplasm of pharynx    Personal history of other diseases of the digestive system 04/26/2019    History of glossitis    Personal history of other specified conditions 06/09/2014    History of heartburn    Personal history of other specified conditions 06/12/2018    History of dyspnea    Personal history of pneumonia (recurrent) 01/23/2014    History of pneumonia    Sprain of joints and ligaments of unspecified parts of neck, initial encounter 04/02/2013    Neck sprain    Zoster without complications 04/02/2013    Herpes zoster        Allergies   Allergen Reactions    Keflex [Cephalexin] Anxiety and Insomnia    Doxycycline Diarrhea        Past Surgical History:   Procedure Laterality Date    BREAST BIOPSY Right     benign    BUNIONECTOMY Left     CERVICAL DISCECTOMY  11/07/2013    Spinal Diskectomy Cervical    CERVICAL FUSION  09/12/2013    Cervical Vertebral Fusion    EYE SURGERY Bilateral 11/07/2013    Eye Surgery    KNEE ARTHROSCOPY W/ DEBRIDEMENT Right 11/07/2013    Arthroscopy Knee Right    OTHER SURGICAL HISTORY  08/16/2014    Throat Surgery to reove larynx cancer (several times)    OTHER SURGICAL HISTORY      occipital nerve stimulator to left upper chest    OTHER SURGICAL HISTORY      several cervical injections for pain    ROTATOR CUFF REPAIR Right 09/05/2013    Rotator Cuff Repair x2    SINUS SURGERY  11/07/2013    Sinus Surgery septoplasty x 2, nasal fracture x 2        Family History   Problem Relation Name Age of Onset    Cirrhosis Mother      Lung cancer Father          Social History     Socioeconomic History    Marital status:      Spouse name: Not on file    Number of children: Not on  file    Years of education: Not on file    Highest education level: Not on file   Occupational History    Not on file   Tobacco Use    Smoking status: Former     Current packs/day: 0.00     Average packs/day: 1 pack/day for 20.0 years (20.0 ttl pk-yrs)     Types: Cigarettes     Start date:      Quit date:      Years since quittin.4     Passive exposure: Past    Smokeless tobacco: Never   Vaping Use    Vaping status: Never Used   Substance and Sexual Activity    Alcohol use: Yes     Alcohol/week: 2.0 standard drinks of alcohol     Types: 2 Glasses of wine per week    Drug use: Yes     Types: Marijuana     Comment: gummies nightly for sleep    Sexual activity: Defer   Other Topics Concern    Not on file   Social History Narrative    Not on file     Social Determinants of Health     Financial Resource Strain: Low Risk  (2023)    Overall Financial Resource Strain (CARDIA)     Difficulty of Paying Living Expenses: Not hard at all   Food Insecurity: No Food Insecurity (2023)    Hunger Vital Sign     Worried About Running Out of Food in the Last Year: Never true     Ran Out of Food in the Last Year: Never true   Transportation Needs: No Transportation Needs (2024)    OASIS : Transportation     Lack of Transportation (Medical): No     Lack of Transportation (Non-Medical): No     Patient Unable or Declines to Respond: No   Physical Activity: Insufficiently Active (2023)    Exercise Vital Sign     Days of Exercise per Week: 7 days     Minutes of Exercise per Session: 20 min   Stress: No Stress Concern Present (2023)    [employer] - Occupational Stress Questionnaire     Feeling of Stress : Not at all   Social Connections: Feeling Socially Integrated (2024)    OASIS : Social Isolation     Frequency of experiencing loneliness or isolation: Never   Recent Concern: Social Connections - Moderately Isolated (2023)    Social Connection and  Isolation Panel [NHANES]     Frequency of Communication with Friends and Family: More than three times a week     Frequency of Social Gatherings with Friends and Family: More than three times a week     Attends Orthodox Services: Never     Active Member of Clubs or Organizations: No     Attends Club or Organization Meetings: Never     Marital Status:    Intimate Partner Violence: Not At Risk (12/21/2023)    Humiliation, Afraid, Rape, and Kick questionnaire     Fear of Current or Ex-Partner: No     Emotionally Abused: No     Physically Abused: No     Sexually Abused: No   Housing Stability: Low Risk  (12/21/2023)    Housing Stability Vital Sign     Unable to Pay for Housing in the Last Year: No     Number of Places Lived in the Last Year: 1     Unstable Housing in the Last Year: No        CURRENT MEDICATIONS:   Current Outpatient Medications   Medication Sig Dispense Refill    acetaminophen (Tylenol) 325 mg tablet Take 3 tablets (975 mg) by mouth every 8 hours if needed for mild pain (1 - 3). (Patient not taking: Reported on 2/20/2024) 90 tablet 1    albuterol 2.5 mg /3 mL (0.083 %) nebulizer solution Take 3 mL (2.5 mg) by nebulization every 4 hours if needed.      albuterol 90 mcg/actuation inhaler USE 2 INHALATIONS EVERY 4 TO 6 HOURS AS NEEDED 17 g 4    APPLE CIDER VINEGAR ORAL Take 1 capsule by mouth once daily.      cetirizine (ZYRTEC) 10 mg capsule 1 capsule (10 mg) once daily.      cevimeline (Evoxac) 30 mg capsule Take 1 capsule (30 mg) by mouth 3 times a day. 90 capsule 1    cyclobenzaprine (Flexeril) 10 mg tablet Take 1 tablet (10 mg) by mouth 3 times a day as needed for muscle spasms. 30 tablet 2    diclofenac sodium (Voltaren) 1 % gel gel Apply 1 Application topically 4 times a day. (Patient taking differently: Apply 4.5 inches (4 g) topically 4 times a day as needed.) 150 g 1    ELDERBERRY FRUIT ORAL Take 1 tablet by mouth once daily.      eletriptan (Relpax) 40 mg tablet Take 1 tablet (40 mg) by  "mouth 1 time if needed for migraine. 6 tablet 5    flurandrenolide (Cordran Tape Large Roll) 4 mcg/cm2 tape Apply topically once daily as needed.      fluticasone propion-salmeteroL (Advair Diskus) 500-50 mcg/dose diskus inhaler Inhale 1 puff 2 times a day.      ibuprofen 800 mg tablet TAKE 1 TABLET (800 MG) BY MOUTH 3 TIMES A DAY AS NEEDED FOR MILD PAIN (1 - 3) (PAIN). 90 tablet 5    L. acidophilus/Bifid. animalis 15.5 billion cell capsule Take 1 capsule by mouth once daily.      lidocaine (Lidoderm) 5 % patch Apply 1 patch and leave in place for 12 hours, then remove and leave off for 12 hours. (Patient taking differently: 1 patch 2 times a day as needed. Apply 1 patch and leave in place for 12 hours, then remove and leave off for 12 hours.) 30 patch 5    montelukast (Singulair) 10 mg tablet TAKE 1 TABLET IN THE EVENING (Patient taking differently: Take 1 tablet (10 mg) by mouth once daily.) 90 tablet 3    omeprazole (PriLOSEC) 20 mg DR capsule Take 1 capsule (20 mg) by mouth once daily in the morning. Take before meals. 90 capsule 3    PARoxetine (Paxil) 10 mg tablet TAKE 1 TABLET DAILY AS DIRECTED 90 tablet 3    pregabalin (Lyrica) 75 mg capsule Take 1 capsule (75 mg) by mouth 2 times a day. 180 capsule 1    zolpidem (Ambien) 5 mg tablet Take 1 tablet (5 mg) by mouth as needed at bedtime for sleep. 30 tablet 2     No current facility-administered medications for this visit.        OBJECTIVE    PHYSICAL EXAM      12/23/2023     1:07 PM 12/25/2023    12:08 PM 1/9/2024    10:28 AM 1/17/2024     7:09 AM 2/20/2024     9:29 AM 5/8/2024     8:37 AM 6/19/2024     9:41 AM   Vitals   Systolic 110 114  110      Diastolic 60 62  70      Heart Rate 102 80  83      Temp 36.6 °C (97.8 °F) 36.4 °C (97.5 °F)  36.5 °C (97.7 °F)      Height (in)   1.626 m (5' 4\") 1.59 m (5' 2.6\") 1.59 m (5' 2.6\") 1.59 m (5' 2.6\") 1.59 m (5' 2.6\")   Weight (lb)   135 137.8 137 137 137   BMI   23.17 kg/m2 24.72 kg/m2 24.58 kg/m2 24.58 kg/m2 24.58 " kg/m2   BSA (m2)   1.66 m2 1.66 m2 1.66 m2 1.66 m2 1.66 m2   Visit Report   Report Report Report Report Report      Body mass index is 24.58 kg/m².    GENERAL: A/Ox3, NAD. Appears healthy, well nourished  PSYCHIATRIC: Mood stable, appropriate memory recall  EYES: EOM intact, no scleral icterus  CARDIOVASCULAR: Palpable peripheral pulses  LUNGS: Breathing non-labored on room air  SKIN: no erythema, rashes, or ecchymoses     MUSCULOSKELETAL:  Laterality: left Knee Exam  - Skin intact  - No erythema or warmth  - No ecchymosis or soft tissue swelling  - Alignment: neutral  - Palpation: Positive tenderness along the lateral joint line  - ROM: 0 - 0 - 120, patella mobility 1+ medial and lateral  - Effusion: Small  - Strength: knee extension and flexion 5/5, EHL/PF/DF motor intact  - Stability:        Anterior drawer stable       Posterior drawer stable       Varus/valgus stable       negative Lachman  - positive Aydee's  - Gait: Antalgic  - Hip Exam: flexion to 100+ degrees, full extension, internal/external rotation adequate, and no pain with log roll    NEUROVASCULAR:  - Neurovascular Status: sensation intact to light touch distally, lower extremity motor intact  - Capillary refill brisk at extremities, Bilateral dorsalis pedis pulse 2+    Imaging: Multiple views of the affected bilateral knee(s) demonstrate: Bilateral degenerative changes worse on the right.   X-rays were personally reviewed and interpreted by me.  Radiology reports were reviewed by me as well, if readily available at the time.    L Inj/Asp: L knee on 6/19/2024 10:18 AM  Indications: pain  Details: 25 G needle, anterolateral approach  Medications: 40 mg triamcinolone acetonide 40 mg/mL; 2 mL lidocaine 10 mg/mL (1 %)  Outcome: tolerated well, no immediate complications  Procedure, treatment alternatives, risks and benefits explained, specific risks discussed. Consent was given by the patient. Immediately prior to procedure a time out was called to  verify the correct patient, procedure, equipment, support staff and site/side marked as required. Patient was prepped and draped in the usual sterile fashion.               Leonard Sal MD  Attending Surgeon    Sports Medicine Orthopaedic Surgery  [facility] Sports Medicine [facility] [employer]

## 2024-06-24 DIAGNOSIS — R68.2 DRY MOUTH: ICD-10-CM

## 2024-06-24 RX ORDER — CEVIMELINE HYDROCHLORIDE 30 MG/1
30 CAPSULE ORAL 3 TIMES DAILY
Qty: 90 CAPSULE | Refills: 1 | Status: SHIPPED | OUTPATIENT
Start: 2024-06-24

## 2024-07-03 DIAGNOSIS — F34.1 PERSISTENT DEPRESSIVE DISORDER: ICD-10-CM

## 2024-07-05 RX ORDER — PAROXETINE 10 MG/1
TABLET, FILM COATED ORAL
Qty: 90 TABLET | Refills: 3 | Status: SHIPPED | OUTPATIENT
Start: 2024-07-05

## 2024-07-19 ENCOUNTER — APPOINTMENT (OUTPATIENT)
Dept: PRIMARY CARE | Facility: CLINIC | Age: 64
End: 2024-07-19
Payer: COMMERCIAL

## 2024-07-25 ENCOUNTER — TELEPHONE (OUTPATIENT)
Dept: PRIMARY CARE | Facility: CLINIC | Age: 64
End: 2024-07-25
Payer: COMMERCIAL

## 2024-07-25 DIAGNOSIS — M54.31 SCIATICA OF RIGHT SIDE: ICD-10-CM

## 2024-07-25 NOTE — TELEPHONE ENCOUNTER
Patient called for refill on lyrica.   Will be out before next appointment in Aug  Needs it to go to [facility] in [facility] express scripts does not cover it anymore.     NOV 8/14/24  Please advise.

## 2024-07-26 DIAGNOSIS — M54.31 SCIATICA OF RIGHT SIDE: ICD-10-CM

## 2024-07-26 RX ORDER — PREGABALIN 75 MG/1
75 CAPSULE ORAL 2 TIMES DAILY
Qty: 60 CAPSULE | Refills: 0 | Status: SHIPPED | OUTPATIENT
Start: 2024-07-26 | End: 2025-01-22

## 2024-07-30 DIAGNOSIS — G47.00 INSOMNIA, UNSPECIFIED TYPE: ICD-10-CM

## 2024-07-30 RX ORDER — ZOLPIDEM TARTRATE 5 MG/1
5 TABLET ORAL NIGHTLY PRN
Qty: 30 TABLET | Refills: 0 | Status: SHIPPED | OUTPATIENT
Start: 2024-07-30 | End: 2024-10-28

## 2024-08-12 ENCOUNTER — LAB (OUTPATIENT)
Dept: LAB | Facility: LAB | Age: 64
End: 2024-08-12
Payer: COMMERCIAL

## 2024-08-12 DIAGNOSIS — R73.9 HYPERGLYCEMIA: ICD-10-CM

## 2024-08-12 DIAGNOSIS — Z00.00 HEALTH MAINTENANCE EXAMINATION: Primary | ICD-10-CM

## 2024-08-12 DIAGNOSIS — Z00.00 HEALTH MAINTENANCE EXAMINATION: ICD-10-CM

## 2024-08-12 LAB
ALBUMIN SERPL BCP-MCNC: 4.3 G/DL (ref 3.4–5)
ALP SERPL-CCNC: 84 U/L (ref 33–136)
ALT SERPL W P-5'-P-CCNC: 14 U/L (ref 7–45)
ANION GAP SERPL CALC-SCNC: 12 MMOL/L (ref 10–20)
AST SERPL W P-5'-P-CCNC: 14 U/L (ref 9–39)
BILIRUB SERPL-MCNC: 0.6 MG/DL (ref 0–1.2)
BUN SERPL-MCNC: 17 MG/DL (ref 6–23)
CALCIUM SERPL-MCNC: 9.7 MG/DL (ref 8.6–10.3)
CHLORIDE SERPL-SCNC: 105 MMOL/L (ref 98–107)
CHOLEST SERPL-MCNC: 234 MG/DL (ref 0–199)
CHOLESTEROL/HDL RATIO: 4.2
CO2 SERPL-SCNC: 28 MMOL/L (ref 21–32)
CREAT SERPL-MCNC: 0.8 MG/DL (ref 0.5–1.05)
EGFRCR SERPLBLD CKD-EPI 2021: 82 ML/MIN/1.73M*2
ERYTHROCYTE [DISTWIDTH] IN BLOOD BY AUTOMATED COUNT: 13.2 % (ref 11.5–14.5)
EST. AVERAGE GLUCOSE BLD GHB EST-MCNC: 128 MG/DL
GLUCOSE SERPL-MCNC: 95 MG/DL (ref 74–99)
HBA1C MFR BLD: 6.1 %
HCT VFR BLD AUTO: 42.7 % (ref 36–46)
HDLC SERPL-MCNC: 55.9 MG/DL
HGB BLD-MCNC: 13.9 G/DL (ref 12–16)
LDLC SERPL CALC-MCNC: 151 MG/DL
MCH RBC QN AUTO: 30.5 PG (ref 26–34)
MCHC RBC AUTO-ENTMCNC: 32.6 G/DL (ref 32–36)
MCV RBC AUTO: 94 FL (ref 80–100)
NON HDL CHOLESTEROL: 178 MG/DL (ref 0–149)
NRBC BLD-RTO: 0 /100 WBCS (ref 0–0)
PLATELET # BLD AUTO: 332 X10*3/UL (ref 150–450)
POTASSIUM SERPL-SCNC: 4.1 MMOL/L (ref 3.5–5.3)
PROT SERPL-MCNC: 7.1 G/DL (ref 6.4–8.2)
RBC # BLD AUTO: 4.56 X10*6/UL (ref 4–5.2)
SODIUM SERPL-SCNC: 141 MMOL/L (ref 136–145)
TRIGL SERPL-MCNC: 134 MG/DL (ref 0–149)
VLDL: 27 MG/DL (ref 0–40)
WBC # BLD AUTO: 6.3 X10*3/UL (ref 4.4–11.3)

## 2024-08-12 PROCEDURE — 80061 LIPID PANEL: CPT

## 2024-08-12 PROCEDURE — 85027 COMPLETE CBC AUTOMATED: CPT

## 2024-08-12 PROCEDURE — 36415 COLL VENOUS BLD VENIPUNCTURE: CPT

## 2024-08-12 PROCEDURE — 80053 COMPREHEN METABOLIC PANEL: CPT

## 2024-08-12 PROCEDURE — 83036 HEMOGLOBIN GLYCOSYLATED A1C: CPT

## 2024-08-14 ENCOUNTER — APPOINTMENT (OUTPATIENT)
Dept: PRIMARY CARE | Facility: CLINIC | Age: 64
End: 2024-08-14
Payer: COMMERCIAL

## 2024-08-14 ENCOUNTER — TELEPHONE (OUTPATIENT)
Dept: PRIMARY CARE | Facility: CLINIC | Age: 64
End: 2024-08-14

## 2024-08-14 VITALS
HEART RATE: 72 BPM | DIASTOLIC BLOOD PRESSURE: 60 MMHG | BODY MASS INDEX: 23.66 KG/M2 | OXYGEN SATURATION: 96 % | SYSTOLIC BLOOD PRESSURE: 100 MMHG | WEIGHT: 128.6 LBS | HEIGHT: 62 IN | TEMPERATURE: 98.1 F

## 2024-08-14 DIAGNOSIS — T78.40XD ALLERGY, SUBSEQUENT ENCOUNTER: ICD-10-CM

## 2024-08-14 DIAGNOSIS — E78.2 MIXED HYPERLIPIDEMIA: ICD-10-CM

## 2024-08-14 DIAGNOSIS — R73.03 PREDIABETES: ICD-10-CM

## 2024-08-14 DIAGNOSIS — M54.31 SCIATICA OF RIGHT SIDE: ICD-10-CM

## 2024-08-14 DIAGNOSIS — Z79.899 MEDICATION MANAGEMENT: ICD-10-CM

## 2024-08-14 DIAGNOSIS — Z00.00 HEALTH MAINTENANCE EXAMINATION: Primary | ICD-10-CM

## 2024-08-14 DIAGNOSIS — F34.1 PERSISTENT DEPRESSIVE DISORDER: ICD-10-CM

## 2024-08-14 DIAGNOSIS — Z13.31 DEPRESSION SCREENING: ICD-10-CM

## 2024-08-14 DIAGNOSIS — Z12.11 COLON CANCER SCREENING: ICD-10-CM

## 2024-08-14 DIAGNOSIS — G47.00 INSOMNIA, UNSPECIFIED TYPE: ICD-10-CM

## 2024-08-14 DIAGNOSIS — K21.9 GASTROESOPHAGEAL REFLUX DISEASE, UNSPECIFIED WHETHER ESOPHAGITIS PRESENT: ICD-10-CM

## 2024-08-14 DIAGNOSIS — G89.29 OTHER CHRONIC PAIN: ICD-10-CM

## 2024-08-14 DIAGNOSIS — G44.009 MIGRAINE-CLUSTER HEADACHE SYNDROME: ICD-10-CM

## 2024-08-14 DIAGNOSIS — R68.2 DRY MOUTH: ICD-10-CM

## 2024-08-14 PROCEDURE — G0444 DEPRESSION SCREEN ANNUAL: HCPCS | Performed by: STUDENT IN AN ORGANIZED HEALTH CARE EDUCATION/TRAINING PROGRAM

## 2024-08-14 PROCEDURE — 80361 OPIATES 1 OR MORE: CPT

## 2024-08-14 PROCEDURE — 3008F BODY MASS INDEX DOCD: CPT | Performed by: STUDENT IN AN ORGANIZED HEALTH CARE EDUCATION/TRAINING PROGRAM

## 2024-08-14 PROCEDURE — 80358 DRUG SCREENING METHADONE: CPT

## 2024-08-14 PROCEDURE — 80373 DRUG SCREENING TRAMADOL: CPT

## 2024-08-14 PROCEDURE — 82570 ASSAY OF URINE CREATININE: CPT

## 2024-08-14 PROCEDURE — 99396 PREV VISIT EST AGE 40-64: CPT | Performed by: STUDENT IN AN ORGANIZED HEALTH CARE EDUCATION/TRAINING PROGRAM

## 2024-08-14 PROCEDURE — 80346 BENZODIAZEPINES1-12: CPT

## 2024-08-14 PROCEDURE — 80368 SEDATIVE HYPNOTICS: CPT

## 2024-08-14 PROCEDURE — 80354 DRUG SCREENING FENTANYL: CPT

## 2024-08-14 PROCEDURE — 1036F TOBACCO NON-USER: CPT | Performed by: STUDENT IN AN ORGANIZED HEALTH CARE EDUCATION/TRAINING PROGRAM

## 2024-08-14 PROCEDURE — 99214 OFFICE O/P EST MOD 30 MIN: CPT | Performed by: STUDENT IN AN ORGANIZED HEALTH CARE EDUCATION/TRAINING PROGRAM

## 2024-08-14 PROCEDURE — 80307 DRUG TEST PRSMV CHEM ANLYZR: CPT

## 2024-08-14 PROCEDURE — 80365 DRUG SCREENING OXYCODONE: CPT

## 2024-08-14 RX ORDER — MONTELUKAST SODIUM 10 MG/1
10 TABLET ORAL DAILY
Qty: 90 TABLET | Refills: 3 | Status: SHIPPED | OUTPATIENT
Start: 2024-08-14

## 2024-08-14 RX ORDER — PREGABALIN 75 MG/1
75 CAPSULE ORAL 2 TIMES DAILY
Qty: 60 CAPSULE | Refills: 5 | Status: SHIPPED | OUTPATIENT
Start: 2024-08-14 | End: 2025-02-10

## 2024-08-14 RX ORDER — ELETRIPTAN HYDROBROMIDE 40 MG/1
40 TABLET, FILM COATED ORAL ONCE AS NEEDED
Qty: 6 TABLET | Refills: 5 | Status: SHIPPED | OUTPATIENT
Start: 2024-08-14

## 2024-08-14 RX ORDER — ZOLPIDEM TARTRATE 5 MG/1
5 TABLET ORAL NIGHTLY PRN
Qty: 30 TABLET | Refills: 5 | Status: SHIPPED | OUTPATIENT
Start: 2024-08-14

## 2024-08-14 RX ORDER — OMEPRAZOLE 20 MG/1
20 CAPSULE, DELAYED RELEASE ORAL
Qty: 90 CAPSULE | Refills: 3 | Status: SHIPPED | OUTPATIENT
Start: 2024-08-14

## 2024-08-14 RX ORDER — PAROXETINE 10 MG/1
10 TABLET, FILM COATED ORAL DAILY
Qty: 90 TABLET | Refills: 3 | Status: SHIPPED | OUTPATIENT
Start: 2024-08-14

## 2024-08-14 RX ORDER — CEVIMELINE HYDROCHLORIDE 30 MG/1
30 CAPSULE ORAL 3 TIMES DAILY
Qty: 90 CAPSULE | Refills: 3 | Status: SHIPPED | OUTPATIENT
Start: 2024-08-14

## 2024-08-14 ASSESSMENT — ENCOUNTER SYMPTOMS
NUMBNESS: 0
OCCASIONAL FEELINGS OF UNSTEADINESS: 0
PALPITATIONS: 0
SHORTNESS OF BREATH: 0
FREQUENCY: 0
NAUSEA: 0
HEADACHES: 0
DYSURIA: 0
CONSTIPATION: 0
FEVER: 0
DIARRHEA: 0
DIZZINESS: 0
COUGH: 0
NERVOUS/ANXIOUS: 0
ABDOMINAL PAIN: 0
FATIGUE: 0
DEPRESSION: 1
DYSPHORIC MOOD: 0
HEMATURIA: 0
WHEEZING: 0
CHILLS: 0

## 2024-08-14 ASSESSMENT — PATIENT HEALTH QUESTIONNAIRE - PHQ9
SUM OF ALL RESPONSES TO PHQ9 QUESTIONS 1 AND 2: 2
1. LITTLE INTEREST OR PLEASURE IN DOING THINGS: SEVERAL DAYS
2. FEELING DOWN, DEPRESSED OR HOPELESS: SEVERAL DAYS

## 2024-08-14 NOTE — TELEPHONE ENCOUNTER
Pt is requesting that she has the Omeprazole 20 mg 1 capsule daily and the   Pregabalin 75 mg 1 tablet 2 times a day be printed, per pt needs to use [facility] for those and needs to see what pharmacy has the best cost deal for her.    Please call when ready for     Print    Please review and advise

## 2024-08-14 NOTE — PROGRESS NOTES
Radha Puri  presents for her annual wellness exam.    HPI  Specialists seen by patient: Ortho  -GYN: Dr Rincon  -eye doctor  -dentist    Last pap/cervical cancer screening: a year ago  Last mammogram: 2022, maybe 2023. Dr Rincon puts that in  Hx of colon ca screening: Yes and 2019 Is looking for a GI that does pills or miralax for colonoscopies. Probably [facility]  Hx of DXA: n/a  History of anxiety or depression: Yes, doing well on paxil   Immunizations: Up-to-date  Diet: Follows a healthy diet, well rounded  Exercise: Gets regular exercise, swimming   Alcohol abuse screen:   Number of alcoholic drinks per week: a few times a week  How many times in the past year 4 or more drinks in a day? On vacation  Lung cancer screening:   Smoking history: ex-smoker and quit in 1994  Drug use: No  Depression screen yearly (phq-2): 0  Living will/healthcare power of :Yes - reviewed    Also needs refills today.  She needs refills of her Lyrica and Ambien.  She takes Ambien for sleep, nightly.  She is doing well on it.  She is not abusing and taking it appropriately.  Lyrica she takes for chronic low back pain with radiculopathy.    OARRS:  Elizabeth Donahue, DO on 8/14/2024  8:01 AM  I have personally reviewed the OARRS report for Radha Puri. I have considered the risks of abuse, dependence, addiction and diversion    Is the patient prescribed a combination of a benzodiazepine and opioid?  No    Last Urine Drug Screen / ordered today: Yes  No results found for this or any previous visit (from the past 8760 hour(s)).  Results are as expected.         Controlled Substance Agreement:  Date of the Last Agreement: 8/14/24  Reviewed Controlled Substance Agreement including but not limited to the benefits, risks, and alternatives to treatment with a Controlled Substance medication(s).    Sleep Aids:   What is the patient's goal of therapy? Improve sleep  Is this being achieved with current treatment?  "yes    Activities of Daily Living:   Is your overall impression that this patient is benefiting (symptom reduction outweighs side effects) from sleep aid therapy? Yes     1. Physical Functioning: Better  2. Family Relationship: Same  3. Social Relationship: Same  4. Mood: Better  5. Sleep Patterns: Better  6. Overall Function: Better       and Lyrica:  What is the patient's goal of therapy? Decrease pain  Is this being achieved with current treatment? Yes    Pain Assessment:  No data recorded    Activities of Daily Living:  Is your overall impression that this patient is benefiting (symptom reduction outweighs side effects) from Lyrica therapy? Yes     1. Physical Functioning: Better  2. Family Relationship: Same  3. Social Relationship: Same  4. Mood: Same  5. Sleep Patterns: Same  6. Overall Function: Better      Review of Systems   Constitutional:  Negative for chills, fatigue and fever.   Respiratory:  Negative for cough, shortness of breath and wheezing.    Cardiovascular:  Negative for chest pain, palpitations and leg swelling.   Gastrointestinal:  Negative for abdominal pain, constipation, diarrhea and nausea.   Genitourinary:  Negative for dysuria, frequency, hematuria and urgency.   Neurological:  Negative for dizziness, numbness and headaches.   Psychiatric/Behavioral:  Negative for dysphoric mood. The patient is not nervous/anxious.           Objective    /60 (BP Location: Right arm, Patient Position: Sitting, BP Cuff Size: Adult)   Pulse 72   Temp 36.7 °C (98.1 °F) (Temporal)   Ht 1.585 m (5' 2.4\")   Wt 58.3 kg (128 lb 9.6 oz)   SpO2 96%   BMI 23.22 kg/m²     Physical Exam  Constitutional:       General: She is not in acute distress.     Appearance: Normal appearance.   HENT:      Head: Normocephalic and atraumatic.      Right Ear: Tympanic membrane and ear canal normal.      Left Ear: Tympanic membrane and ear canal normal.      Mouth/Throat:      Mouth: Mucous membranes are moist.      " Pharynx: No posterior oropharyngeal erythema.   Eyes:      Extraocular Movements: Extraocular movements intact.      Pupils: Pupils are equal, round, and reactive to light.   Cardiovascular:      Rate and Rhythm: Normal rate and regular rhythm.      Heart sounds: No murmur heard.  Pulmonary:      Effort: Pulmonary effort is normal. No respiratory distress.      Breath sounds: Normal breath sounds. No wheezing.   Abdominal:      General: Bowel sounds are normal.      Palpations: Abdomen is soft.      Tenderness: There is no abdominal tenderness. There is no guarding.   Musculoskeletal:         General: Normal range of motion.      Cervical back: Neck supple.   Skin:     General: Skin is warm and dry.   Neurological:      General: No focal deficit present.      Mental Status: She is alert and oriented to person, place, and time.   Psychiatric:         Mood and Affect: Mood normal.         Behavior: Behavior normal.          Problem List Items Addressed This Visit       Insomnia    Relevant Medications    zolpidem (Ambien) 5 mg tablet    Other Relevant Orders    Zolpidem Confirmation, Urine    OOB Internal Tracking     Other Visit Diagnoses       Health maintenance examination    -  Primary    Medication management        Relevant Orders    Opiate/Opioid/Benzo Prescription Compliance    Zolpidem Confirmation, Urine    OOB Internal Tracking    OOB Internal Tracking    Other chronic pain        Relevant Orders    Opiate/Opioid/Benzo Prescription Compliance    OOB Internal Tracking    Dry mouth        Relevant Medications    cevimeline (Evoxac) 30 mg capsule    Migraine-cluster headache syndrome        Relevant Medications    eletriptan (Relpax) 40 mg tablet    Allergy, subsequent encounter        Relevant Medications    montelukast (Singulair) 10 mg tablet    Gastroesophageal reflux disease, unspecified whether esophagitis present        Relevant Medications    omeprazole (PriLOSEC) 20 mg DR capsule    Persistent  depressive disorder        Relevant Medications    PARoxetine (Paxil) 10 mg tablet    Sciatica of right side        Relevant Medications    pregabalin (Lyrica) 75 mg capsule    Colon cancer screening        Relevant Orders    Colonoscopy Screening; Average Risk Patient    Mixed hyperlipidemia        Relevant Orders    Lipid Panel    Comprehensive Metabolic Panel    Prediabetes        Relevant Orders    Hemoglobin A1C             We will obtain fasting blood work.  Results will be communicated to the patient via MyChart, phone call, or a letter.   We reviewed appropriate preventative health screening guidelines.  We discussed regular aerobic exercise. We discussed proper nutrition and weight control.    Will continue her Lyrica for her chronic low back pain with radiculopathy.  Will continue her Ambien for right now.  She is aware of the risks of being on the medication.    I personally have reviewed the OARRS report for this patient.  This report is scanned into the electronic medical record.  I have considered the risks of abuse, dependence, addiction and diversion.  I believe that it is clinically appropriate for the patient to be prescribed this medication.  Urine drug screen and drug contract either pending or up-to-date.    5-10 minutes were spent screening for depression using PHQ-2/PHQ-9 as documented in the chart    Elizabeth Donahue,   08/14/24

## 2024-08-15 LAB
AMPHETAMINES UR QL SCN: NORMAL
BARBITURATES UR QL SCN: NORMAL
BZE UR QL SCN: NORMAL
CANNABINOIDS UR QL SCN: NORMAL
CREAT UR-MCNC: 94.3 MG/DL (ref 20–320)
PCP UR QL SCN: NORMAL

## 2024-08-16 ENCOUNTER — TRANSCRIBE ORDERS (OUTPATIENT)
Dept: ORTHOPEDIC SURGERY | Facility: HOSPITAL | Age: 64
End: 2024-08-16
Payer: COMMERCIAL

## 2024-08-16 DIAGNOSIS — M54.50 LOW BACK PAIN, UNSPECIFIED BACK PAIN LATERALITY, UNSPECIFIED CHRONICITY, UNSPECIFIED WHETHER SCIATICA PRESENT: ICD-10-CM

## 2024-08-19 ENCOUNTER — HOSPITAL ENCOUNTER (OUTPATIENT)
Dept: RADIOLOGY | Facility: CLINIC | Age: 64
Discharge: HOME | End: 2024-08-19
Payer: COMMERCIAL

## 2024-08-19 ENCOUNTER — APPOINTMENT (OUTPATIENT)
Dept: ORTHOPEDIC SURGERY | Facility: CLINIC | Age: 64
End: 2024-08-19
Payer: COMMERCIAL

## 2024-08-19 DIAGNOSIS — M54.31 SCIATICA OF RIGHT SIDE: ICD-10-CM

## 2024-08-19 DIAGNOSIS — M54.50 LOW BACK PAIN, UNSPECIFIED BACK PAIN LATERALITY, UNSPECIFIED CHRONICITY, UNSPECIFIED WHETHER SCIATICA PRESENT: ICD-10-CM

## 2024-08-19 DIAGNOSIS — M54.50 CHRONIC LOW BACK PAIN WITHOUT SCIATICA, UNSPECIFIED BACK PAIN LATERALITY: ICD-10-CM

## 2024-08-19 DIAGNOSIS — K21.9 GASTROESOPHAGEAL REFLUX DISEASE, UNSPECIFIED WHETHER ESOPHAGITIS PRESENT: ICD-10-CM

## 2024-08-19 DIAGNOSIS — G44.009 MIGRAINE-CLUSTER HEADACHE SYNDROME: ICD-10-CM

## 2024-08-19 DIAGNOSIS — M41.9 SCOLIOSIS OF LUMBAR SPINE, UNSPECIFIED SCOLIOSIS TYPE: Primary | ICD-10-CM

## 2024-08-19 DIAGNOSIS — T78.40XD ALLERGY, SUBSEQUENT ENCOUNTER: ICD-10-CM

## 2024-08-19 DIAGNOSIS — G89.29 CHRONIC LOW BACK PAIN WITHOUT SCIATICA, UNSPECIFIED BACK PAIN LATERALITY: ICD-10-CM

## 2024-08-19 LAB
1OH-MIDAZOLAM UR CFM-MCNC: <25 NG/ML
6MAM UR CFM-MCNC: <25 NG/ML
7AMINOCLONAZEPAM UR CFM-MCNC: <25 NG/ML
A-OH ALPRAZ UR CFM-MCNC: <25 NG/ML
ALPRAZ UR CFM-MCNC: <25 NG/ML
CHLORDIAZEP UR CFM-MCNC: <25 NG/ML
CLONAZEPAM UR CFM-MCNC: <25 NG/ML
CODEINE UR CFM-MCNC: <50 NG/ML
DIAZEPAM UR CFM-MCNC: <25 NG/ML
EDDP UR CFM-MCNC: <25 NG/ML
FENTANYL UR CFM-MCNC: <2.5 NG/ML
HYDROCODONE CTO UR CFM-MCNC: <25 NG/ML
HYDROMORPHONE UR CFM-MCNC: <25 NG/ML
LORAZEPAM UR CFM-MCNC: <25 NG/ML
METHADONE UR CFM-MCNC: <25 NG/ML
MIDAZOLAM UR CFM-MCNC: <25 NG/ML
MORPHINE UR CFM-MCNC: <50 NG/ML
NORDIAZEPAM UR CFM-MCNC: <25 NG/ML
NORFENTANYL UR CFM-MCNC: <2.5 NG/ML
NORHYDROCODONE UR CFM-MCNC: <25 NG/ML
NOROXYCODONE UR CFM-MCNC: <25 NG/ML
NORTRAMADOL UR-MCNC: <50 NG/ML
OXAZEPAM UR CFM-MCNC: <25 NG/ML
OXYCODONE UR CFM-MCNC: <25 NG/ML
OXYMORPHONE UR CFM-MCNC: <25 NG/ML
TEMAZEPAM UR CFM-MCNC: <25 NG/ML
TRAMADOL UR CFM-MCNC: <50 NG/ML
ZOLPIDEM UR CFM-MCNC: >1000 NG/ML
ZOLPIDEM UR CFM-MCNC: >1000 NG/ML
ZOLPIDEM UR-MCNC: <25 NG/ML
ZOLPIDEM UR-MCNC: <25 NG/ML

## 2024-08-19 PROCEDURE — 72110 X-RAY EXAM L-2 SPINE 4/>VWS: CPT | Performed by: STUDENT IN AN ORGANIZED HEALTH CARE EDUCATION/TRAINING PROGRAM

## 2024-08-19 PROCEDURE — 72110 X-RAY EXAM L-2 SPINE 4/>VWS: CPT

## 2024-08-19 PROCEDURE — 99214 OFFICE O/P EST MOD 30 MIN: CPT | Performed by: ORTHOPAEDIC SURGERY

## 2024-08-19 RX ORDER — PREGABALIN 75 MG/1
CAPSULE ORAL
Refills: 0 | OUTPATIENT
Start: 2024-08-19

## 2024-08-19 RX ORDER — PREGABALIN 75 MG/1
75 CAPSULE ORAL 2 TIMES DAILY
Qty: 60 CAPSULE | Refills: 5 | Status: SHIPPED | OUTPATIENT
Start: 2024-08-19 | End: 2025-02-15

## 2024-08-19 RX ORDER — OMEPRAZOLE 20 MG/1
CAPSULE, DELAYED RELEASE ORAL
Refills: 0 | OUTPATIENT
Start: 2024-08-19

## 2024-08-19 RX ORDER — OMEPRAZOLE 20 MG/1
20 CAPSULE, DELAYED RELEASE ORAL
Qty: 90 CAPSULE | Refills: 3 | Status: SHIPPED | OUTPATIENT
Start: 2024-08-19

## 2024-08-19 ASSESSMENT — PAIN - FUNCTIONAL ASSESSMENT: PAIN_FUNCTIONAL_ASSESSMENT: 0-10

## 2024-08-19 ASSESSMENT — PAIN SCALES - GENERAL: PAINLEVEL_OUTOF10: 5 - MODERATE PAIN

## 2024-08-19 ASSESSMENT — PAIN DESCRIPTION - DESCRIPTORS: DESCRIPTORS: ACHING;SHOOTING;SHARP

## 2024-08-19 NOTE — PROGRESS NOTES
HPI:Radha Puri is a 63-year-old woman who comes today for follow-up.  She comes to review her lumbar spine.  She does have some activity related back pain.  She is faithfully doing core exercises.  She denies claudication or radicular symptoms.      ROS:  Reviewed on EMR and patient intake sheet.    PMH/SH:  Reviewed on EMR and patient intake sheet.    Exam:  Physical Exam    Constitutional: Well appearing; no acute distress  Eyes: pupils are equal and round  Psych: normal affect  Respiratory: non-labored breathing  Cardiovascular: regular rate and rhythm  GI: non-distended abdomen  Musculoskeletal: no pain with range of motion of the hips bilaterally  Neurologic: [5]/5 strength in the lower extremities bilaterally]; [negative] straight leg raise    Radiology:     Standing x-rays personally reviewed.  No significant change in her lumbar curve.  Approximately 24 degree coronal curve from L1-L4.  There has been slight increase in the disc degeneration at the L1-2 level.    Diagnosis:    Chronic back pain; lumbar scoliosis    Assessment and Plan:   63-year-old woman with chronic back pain in setting of a chronic lumbar scoliosis.  The degree of her curvature has not significantly increased in the last 2 years.  I have encouraged continued nonoperative management and observation.  I will see her back as needed.    The patient was in agreement with the plan. At the end of the visit today, the patient felt that all questions had been answered satisfactorily.  The patient was pleased with the visit and very appreciative for the care rendered.     Thank you very much for the kind referral.  It is a privilege, and a pleasure, to partner with you in the care of your patients.  I would be delighted to assist you with any further consultations as needed.          Dimas Parker MD    Chief of Spine Surgery, [facility]  Director of Spine Service, [facility]  [place]  , Department of Orthopaedics  [employer]  [id number]  [place] [id number]  P: [phone]  [email or web address]    This note was dictated with voice recognition software.  It has not been proofread for grammatical errors, typographical mistakes or other semantic inconsistencies.

## 2024-08-26 DIAGNOSIS — Z12.11 COLON CANCER SCREENING: ICD-10-CM

## 2024-08-26 RX ORDER — SOD SULF/POT CHLORIDE/MAG SULF 1.479 G
TABLET ORAL
Qty: 24 TABLET | Refills: 0 | Status: SHIPPED | OUTPATIENT
Start: 2024-08-26

## 2024-09-01 DIAGNOSIS — M54.31 SCIATICA OF RIGHT SIDE: ICD-10-CM

## 2024-09-04 RX ORDER — IBUPROFEN 800 MG/1
800 TABLET ORAL 3 TIMES DAILY PRN
Qty: 90 TABLET | Refills: 5 | Status: SHIPPED | OUTPATIENT
Start: 2024-09-04 | End: 2025-09-04

## 2024-09-05 RX ORDER — ELETRIPTAN HYDROBROMIDE 40 MG/1
40 TABLET, FILM COATED ORAL ONCE AS NEEDED
Qty: 36 TABLET | Refills: 1 | Status: SHIPPED | OUTPATIENT
Start: 2024-09-05

## 2024-09-05 RX ORDER — MONTELUKAST SODIUM 10 MG/1
10 TABLET ORAL NIGHTLY
Qty: 90 TABLET | Refills: 3 | Status: SHIPPED | OUTPATIENT
Start: 2024-09-05

## 2024-09-16 DIAGNOSIS — K21.9 GASTROESOPHAGEAL REFLUX DISEASE, UNSPECIFIED WHETHER ESOPHAGITIS PRESENT: ICD-10-CM

## 2024-09-16 DIAGNOSIS — G44.009 MIGRAINE-CLUSTER HEADACHE SYNDROME: ICD-10-CM

## 2024-09-16 DIAGNOSIS — F34.1 PERSISTENT DEPRESSIVE DISORDER: ICD-10-CM

## 2024-09-18 RX ORDER — PAROXETINE 10 MG/1
10 TABLET, FILM COATED ORAL EVERY MORNING
Qty: 90 TABLET | Refills: 1 | Status: SHIPPED | OUTPATIENT
Start: 2024-09-18

## 2024-09-18 RX ORDER — ELETRIPTAN HYDROBROMIDE 40 MG/1
TABLET, FILM COATED ORAL
Refills: 0 | OUTPATIENT
Start: 2024-09-18

## 2024-09-18 RX ORDER — OMEPRAZOLE 20 MG/1
20 CAPSULE, DELAYED RELEASE ORAL
Qty: 90 CAPSULE | Refills: 3 | Status: SHIPPED | OUTPATIENT
Start: 2024-09-18

## 2024-09-30 NOTE — PROGRESS NOTES
ORTHOPEDIC TOTAL JOINT  POST-OPERATIVE FOLLOW UP      ============================  IMPRESSION/PLAN:  ============================  63 y.o. female s/p Right Total Hip Replacement completed on 12/21/2023.    PLAN:  -Weightbearing: Weight bearing as tolerated  -DVT Prophylaxis: no longer needed  -Radiology Studies: X-rays from today reviewed the patient demonstrating stable implants  -Therapies: Continue regular exercise program  -Follow-up at the 1 year ashvin of surgery with new x-rays        Radha Puri presents today for follow up of joint replacement above. Pain controlled with current treatment plan. Patient has begun physical therapy. They are currently doing therapy at [facility] in [place]. Her last session is next week.  They are currently ambulating with no assisted devices.   XR done today.    Review of Systems:   Constitutional: See HPI for pain assessment, No significant weight loss, recent trauma. Denies fevers/chills  Cardiovascular: No chest pain, shortness of breath  Respiratory: No difficulty breathing, cough  Gastrointestinal: No nausea, vomiting, diarrhea, constipation  Musculoskeletal: Noted in HPI, no arthralgias   Integumentary: No rashes, easy bruising, redness   Neurological: no numbness or tingling in extremities, no gait disturbances     Patient Active Problem List   Diagnosis    Insomnia    Hyperglycemia    Elevated serum creatinine    Acute pain of left knee    Arthritis of right knee    Primary osteoarthritis of right hip       =================================  EXAM  =================================  GENERAL: A/Ox3, NAD. Appears healthy, well nourished  CARDIAC: regular rate  LUNGS: Breathing non-labored    MUSCULOSKELETAL:  Laterality: right Hip exam  - Strength: Abduction 5/5, Flexion 5/5  - Palpation: non TTP along surgical site  - Incision: Well-healed  - EHL/PF/DF motor intact  - compartments soft, negative homans  - Gait: using no assistive device    NEUROVASCULAR:  - Neurovascular  Status: sensation intact to light touch distally  - Capillary refill brisk at extremities, Bilateral dorsalis pedis pulse 2+     IMAGING: X-rays of the right hip and pelvis reviewed which demonstrates no signs of loosening failure right total hip arthroplasty. X-rays were personally reviewed by me.  Radiology reports were reviewed by me as well, if available at the time.        Gonzalo Bishop DO  Attending Surgeon  Joint Replacement and Adult Reconstructive Surgery  [place], [place]

## 2024-10-01 ENCOUNTER — HOSPITAL ENCOUNTER (OUTPATIENT)
Dept: RADIOLOGY | Facility: CLINIC | Age: 64
Discharge: HOME | End: 2024-10-01
Payer: COMMERCIAL

## 2024-10-01 ENCOUNTER — APPOINTMENT (OUTPATIENT)
Dept: ORTHOPEDIC SURGERY | Facility: CLINIC | Age: 64
End: 2024-10-01
Payer: COMMERCIAL

## 2024-10-01 VITALS — HEIGHT: 62 IN | BODY MASS INDEX: 23.65 KG/M2 | WEIGHT: 128.53 LBS

## 2024-10-01 DIAGNOSIS — M25.552 LEFT HIP PAIN: ICD-10-CM

## 2024-10-01 DIAGNOSIS — M16.11 PRIMARY OSTEOARTHRITIS OF RIGHT HIP: ICD-10-CM

## 2024-10-01 DIAGNOSIS — M70.62 TROCHANTERIC BURSITIS OF LEFT HIP: Primary | ICD-10-CM

## 2024-10-01 PROCEDURE — 73502 X-RAY EXAM HIP UNI 2-3 VIEWS: CPT | Mod: LEFT SIDE | Performed by: STUDENT IN AN ORGANIZED HEALTH CARE EDUCATION/TRAINING PROGRAM

## 2024-10-01 PROCEDURE — 1036F TOBACCO NON-USER: CPT | Performed by: ORTHOPAEDIC SURGERY

## 2024-10-01 PROCEDURE — 3008F BODY MASS INDEX DOCD: CPT | Performed by: ORTHOPAEDIC SURGERY

## 2024-10-01 PROCEDURE — 20610 DRAIN/INJ JOINT/BURSA W/O US: CPT | Performed by: ORTHOPAEDIC SURGERY

## 2024-10-01 PROCEDURE — 99214 OFFICE O/P EST MOD 30 MIN: CPT | Performed by: ORTHOPAEDIC SURGERY

## 2024-10-01 PROCEDURE — 73502 X-RAY EXAM HIP UNI 2-3 VIEWS: CPT | Mod: LT

## 2024-10-01 RX ORDER — TRIAMCINOLONE ACETONIDE 40 MG/ML
80 INJECTION, SUSPENSION INTRA-ARTICULAR; INTRAMUSCULAR
Status: COMPLETED | OUTPATIENT
Start: 2024-10-01 | End: 2024-10-01

## 2024-10-01 ASSESSMENT — PAIN SCALES - GENERAL: PAINLEVEL_OUTOF10: 4

## 2024-10-01 ASSESSMENT — PAIN - FUNCTIONAL ASSESSMENT: PAIN_FUNCTIONAL_ASSESSMENT: 0-10

## 2024-10-01 NOTE — PROGRESS NOTES
PRIMARY CARE PHYSICIAN: Elizabeth Donahue DO  REFERRING PROVIDER: No referring provider defined for this encounter.     CONSULT ORTHOPAEDIC: Hip Evaluation        ASSESSMENT & PLAN    IMPRESSION:  1.  Left hip trochanteric bursitis  2.  Left hip abductor tendinitis    PLAN:  Discussed the patient findings above.  Reviewed her x-rays.  She has slight advancing arthritis in the left hip compared to previous x-rays but is asymptomatic from this on her examination.  Most her symptoms seem to be related to the diagnosis above.  Given that she is feeling improved with conservative management we recommended we try corticosteroid injection for pain control which we can repeat every 3 months if she has good relief in addition to this we will start a home exercise guide for her bursitis.  If she fails to improve may consider formal physical therapy.  Patient is agreed with this plan of care.  See below for injection details.  Will follow-up as needed otherwise.    L Inj/Asp: R greater trochanteric bursa on 10/1/2024 8:59 AM  Indications: pain  Details: 25 G needle, lateral approach  Medications: 80 mg triamcinolone acetonide 40 mg/mL  Outcome: tolerated well, no immediate complications  Procedure, treatment alternatives, risks and benefits explained, specific risks discussed. Consent was given by the patient.             SUBJECTIVE  CHIEF COMPLAINT:   Chief Complaint   Patient presents with    Left Hip - Pain        HPI: Radha Puri is a 63 y.o. patient. Radha Puri has had progressive problems with the left hip over the past 6 months. They do report any trauma. She has a Hx of falls to the left side, last fall was 2 weeks ago. They do not report any constant or progressive numbness or tingling in their legs. Their symptoms are interfering with activities which include lateral and posterior sided pain, instability, and popping noises. XR done today.     FUNCTIONAL STATUS: not limited.  AMBULATORY STATUS:   independent  PREVIOUS TREATMENTS: NSAIDS Advil with mild improvement  Therapy PT   still taking  HISTORY OF SURGERY ON AFFECTED HIP(S): No   BACK PAIN REPORTED: Yes       REVIEW OF SYSTEMS  Constitutional: See HPI for pain assessment, No significant weight loss, recent trauma  Cardiovascular: No chest pain, shortness of breath  Respiratory: No difficulty breathing, cough  Gastrointestinal: No nausea, vomiting, diarrhea, constipation  Musculoskeletal: Noted in HPI, positive for pain, restricted motion, stiffness  Integumentary: No rashes, easy bruising, redness   Neurological: no numbness or tingling in extremities, no gait disturbances   Psychiatric: No mood changes, memory changes, social issues  Heme/Lymph: no excessive swelling, easy bruising, excessive bleeding  ENT: No hearing changes  Eyes: No vision changes    Past Medical History:   Diagnosis Date    Acute bronchitis due to other specified organisms 08/06/2019    Acute bacterial bronchitis    Atelectasis 06/24/2013    Atelectasis    Carpal tunnel syndrome, unspecified upper limb 06/24/2013    Carpal tunnel syndrome    Chest pain, unspecified 04/02/2013    Chest pain    Chronic obstructive pulmonary disease, unspecified (Multi)     Chronic obstructive pulmonary disease    Cutaneous abscess of face 04/02/2013    Abscess of face    Diverticulosis     Enthesopathy, unspecified 04/02/2013    Tendonitis    Erysipelas 04/02/2013    Erysipelas    Gastro-esophageal reflux disease with esophagitis, without bleeding 08/14/2014    Gastro-esophageal reflux disease with esophagitis    GERD (gastroesophageal reflux disease)     Hard to intubate     Headache, unspecified 04/02/2013    Headache    Larynx cancer (Multi)     surgery to remove only    Migraine, unspecified, not intractable, without status migrainosus 11/28/2018    Headache, migraine    Occipital neuralgia     Other conditions influencing health status 06/24/2013    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     Pain in leg, unspecified 06/24/2013    Lower extremity pain    Personal history of malignant neoplasm of unspecified site of lip, oral cavity, and pharynx 08/14/2014    History of malignant neoplasm of pharynx    Personal history of other diseases of the digestive system 04/26/2019    History of glossitis    Personal history of other specified conditions 06/09/2014    History of heartburn    Personal history of other specified conditions 06/12/2018    History of dyspnea    Personal history of pneumonia (recurrent) 01/23/2014    History of pneumonia    Sprain of joints and ligaments of unspecified parts of neck, initial encounter 04/02/2013    Neck sprain    Zoster without complications 04/02/2013    Herpes zoster        Allergies   Allergen Reactions    Keflex [Cephalexin] Anxiety and Insomnia    Doxycycline Diarrhea        Past Surgical History:   Procedure Laterality Date    BREAST BIOPSY Right     benign    BUNIONECTOMY Left     CERVICAL DISCECTOMY  11/07/2013    Spinal Diskectomy Cervical    CERVICAL FUSION  09/12/2013    Cervical Vertebral Fusion    EYE SURGERY Bilateral 11/07/2013    Eye Surgery    KNEE ARTHROSCOPY W/ DEBRIDEMENT Right 11/07/2013    Arthroscopy Knee Right    OTHER SURGICAL HISTORY  08/16/2014    Throat Surgery to reove larynx cancer (several times)    OTHER SURGICAL HISTORY      occipital nerve stimulator to left upper chest    OTHER SURGICAL HISTORY      several cervical injections for pain    ROTATOR CUFF REPAIR Right 09/05/2013    Rotator Cuff Repair x2    SINUS SURGERY  11/07/2013    Sinus Surgery septoplasty x 2, nasal fracture x 2        Family History   Problem Relation Name Age of Onset    Cirrhosis Mother      Lung cancer Father          Social History     Socioeconomic History    Marital status:      Spouse name: Not on file    Number of children: Not on file    Years of education: Not on file    Highest education level: Not on file   Occupational History    Not on file    Tobacco Use    Smoking status: Former     Current packs/day: 0.00     Average packs/day: 1 pack/day for 20.0 years (20.0 ttl pk-yrs)     Types: Cigarettes     Start date:      Quit date:      Years since quittin.7     Passive exposure: Past    Smokeless tobacco: Never   Vaping Use    Vaping status: Never Used   Substance and Sexual Activity    Alcohol use: Yes     Alcohol/week: 2.0 standard drinks of alcohol     Types: 2 Glasses of wine per week    Drug use: Yes     Types: Marijuana     Comment: gummies nightly for sleep    Sexual activity: Defer   Other Topics Concern    Not on file   Social History Narrative    Not on file     Social Determinants of Health     Financial Resource Strain: Low Risk  (2023)    Overall Financial Resource Strain (CARDIA)     Difficulty of Paying Living Expenses: Not hard at all   Food Insecurity: No Food Insecurity (2023)    Hunger Vital Sign     Worried About Running Out of Food in the Last Year: Never true     Ran Out of Food in the Last Year: Never true   Transportation Needs: No Transportation Needs (2024)    OASIS : Transportation     Lack of Transportation (Medical): No     Lack of Transportation (Non-Medical): No     Patient Unable or Declines to Respond: No   Physical Activity: Insufficiently Active (2023)    Exercise Vital Sign     Days of Exercise per Week: 7 days     Minutes of Exercise per Session: 20 min   Stress: No Stress Concern Present (2023)    [employer] [place] [employer] - Occupational Stress Questionnaire     Feeling of Stress : Not at all   Social Connections: Feeling Socially Integrated (2024)    OASIS : Social Isolation     Frequency of experiencing loneliness or isolation: Never   Recent Concern: Social Connections - Moderately Isolated (2023)    Social Connection and Isolation Panel [NHANES]     Frequency of Communication with Friends and Family: More than three times a week      Frequency of Social Gatherings with Friends and Family: More than three times a week     Attends Gnosticism Services: Never     Active Member of Clubs or Organizations: No     Attends Club or Organization Meetings: Never     Marital Status:    Intimate Partner Violence: Not At Risk (12/21/2023)    Humiliation, Afraid, Rape, and Kick questionnaire     Fear of Current or Ex-Partner: No     Emotionally Abused: No     Physically Abused: No     Sexually Abused: No   Housing Stability: Low Risk  (12/21/2023)    Housing Stability Vital Sign     Unable to Pay for Housing in the Last Year: No     Number of Places Lived in the Last Year: 1     Unstable Housing in the Last Year: No        CURRENT MEDICATIONS:   Current Outpatient Medications   Medication Sig Dispense Refill    albuterol 2.5 mg /3 mL (0.083 %) nebulizer solution Take 3 mL (2.5 mg) by nebulization every 4 hours if needed.      albuterol 90 mcg/actuation inhaler USE 2 INHALATIONS EVERY 4 TO 6 HOURS AS NEEDED 17 g 4    APPLE CIDER VINEGAR ORAL Take 1 capsule by mouth once daily.      cetirizine (ZYRTEC) 10 mg capsule 1 capsule (10 mg) once daily.      cevimeline (Evoxac) 30 mg capsule Take 1 capsule (30 mg) by mouth 3 times a day. 90 capsule 3    cyclobenzaprine (Flexeril) 10 mg tablet Take 1 tablet (10 mg) by mouth 3 times a day as needed for muscle spasms. 30 tablet 2    diclofenac sodium (Voltaren) 1 % gel gel Apply 1 Application topically 4 times a day. (Patient taking differently: Apply 4.5 inches (4 g) topically 4 times a day as needed.) 150 g 1    ELDERBERRY FRUIT ORAL Take 1 tablet by mouth once daily.      eletriptan (Relpax) 40 mg tablet Take 1 tablet (40 mg) by mouth 1 time if needed for migraine. 36 tablet 1    flurandrenolide (Cordran Tape Large Roll) 4 mcg/cm2 tape Apply topically once daily as needed.      fluticasone propion-salmeteroL (Advair Diskus) 500-50 mcg/dose diskus inhaler Inhale 1 puff 2 times a day.      ibuprofen 800 mg tablet  "TAKE 1 TABLET (800 MG) BY MOUTH 3 TIMES A DAY AS NEEDED FOR MILD PAIN (1 - 3) (PAIN). 90 tablet 5    L. acidophilus/Bifid. animalis 15.5 billion cell capsule Take 1 capsule by mouth once daily.      lidocaine (Lidoderm) 5 % patch Apply 1 patch and leave in place for 12 hours, then remove and leave off for 12 hours. (Patient taking differently: 1 patch 2 times a day as needed. Apply 1 patch and leave in place for 12 hours, then remove and leave off for 12 hours.) 30 patch 5    montelukast (Singulair) 10 mg tablet Take 1 tablet (10 mg) by mouth once daily at bedtime. 90 tablet 3    omeprazole (PriLOSEC) 20 mg DR capsule Take 1 capsule (20 mg) by mouth once daily in the morning. Take before meals. 90 capsule 3    PARoxetine (Paxil) 10 mg tablet Take 1 tablet (10 mg) by mouth once daily in the morning. 90 tablet 1    pregabalin (Lyrica) 75 mg capsule Take 1 capsule (75 mg) by mouth 2 times a day. 180 capsule 1    sod sulf-pot chloride-mag sulf (Sutab) 1.479-0.188- 0.225 gram tablet Starting at 6pm open one bottle of pills and fill glass provided with water and drink according to prep sheet. Start the 2nd bottle with directions on prep sheet 5 hours before procedure time 24 tablet 0    zolpidem (Ambien) 5 mg tablet Take 1 tablet (5 mg) by mouth as needed at bedtime for sleep. 30 tablet 5     No current facility-administered medications for this visit.        OBJECTIVE    PHYSICAL EXAM      1/25/2024     9:50 AM 2/14/2024     5:32 PM 2/20/2024     9:29 AM 2/27/2024     5:56 PM 5/8/2024     8:37 AM 6/19/2024     9:41 AM 8/14/2024     7:41 AM   Vitals   Systolic 114 141  123   100   Diastolic 58 84  58   60   Heart Rate 95 84  84   72   Temp 36.9 °C (98.4 °F) 36.7 °C (98.1 °F)  36.7 °C (98 °F)   36.7 °C (98.1 °F)   Resp 18 16  16      Height (in)   1.59 m (5' 2.6\")  1.59 m (5' 2.6\") 1.59 m (5' 2.6\") 1.585 m (5' 2.4\")   Weight (lb) 136.69 134.92 137  137 137 128.6   BMI 24.52 kg/m2 24.21 kg/m2 24.58 kg/m2  24.58 kg/m2 24.58 " kg/m2 23.22 kg/m2   BSA (m2) 1.65 m2 1.64 m2 1.66 m2  1.66 m2 1.66 m2 1.6 m2   Visit Report   Report  Report Report Report      There is no height or weight on file to calculate BMI.    GENERAL: A/Ox3, NAD. Appears healthy, well nourished  PSYCHIATRIC: Mood stable, appropriate memory recall  EYES: EOM intact, no scleral icterus  CARDIAC: regular rate  LUNGS: Breathing non-labored  SKIN: no erythema, rashes, or ecchymoses     MUSCULOSKELETAL:  Laterality: left Hip Exam  - ROM, Extension: full, no flexion contracture  - Strength: Abduction 5/5, Flexion 5/5. Abductor pain against resistance: Yes   - Palpation:  TTP along greater trochanter, posterolateral border  - Log roll/IR exam: non painful, good IR  - Straight leg raise: negative  - EHL/PF/DF motor intact  - Gait: normal  - Special Tests: negative Kwan    NEUROVASCULAR:  - Neurovascular Status: sensation intact to light touch distally  - Capillary refill brisk at extremities, Bilateral dorsalis pedis pulse 2+           IMAGING:  Multiple views of the affected left hip(s) demonstrate: Mild arthritis with mostly medial pole disease slightly advanced when compared to previous x-rays.   X-rays were personally reviewed and interpreted by me.  Radiology reports were reviewed by me as well, if readily available at the time.        Gonzalo Bishop DO  Attending Surgeon  Joint Replacement and Adult Reconstructive Surgery  [place], [place]

## 2024-10-02 ENCOUNTER — HOSPITAL ENCOUNTER (OUTPATIENT)
Dept: GASTROENTEROLOGY | Facility: HOSPITAL | Age: 64
Discharge: HOME | End: 2024-10-02
Payer: COMMERCIAL

## 2024-10-02 ENCOUNTER — APPOINTMENT (OUTPATIENT)
Dept: ORTHOPEDIC SURGERY | Facility: CLINIC | Age: 64
End: 2024-10-02
Payer: COMMERCIAL

## 2024-10-02 ENCOUNTER — APPOINTMENT (OUTPATIENT)
Dept: GASTROENTEROLOGY | Facility: EXTERNAL LOCATION | Age: 64
End: 2024-10-02
Payer: COMMERCIAL

## 2024-10-02 ENCOUNTER — ANESTHESIA EVENT (OUTPATIENT)
Dept: GASTROENTEROLOGY | Facility: HOSPITAL | Age: 64
End: 2024-10-02
Payer: COMMERCIAL

## 2024-10-02 ENCOUNTER — ANESTHESIA (OUTPATIENT)
Dept: GASTROENTEROLOGY | Facility: HOSPITAL | Age: 64
End: 2024-10-02
Payer: COMMERCIAL

## 2024-10-02 VITALS
SYSTOLIC BLOOD PRESSURE: 114 MMHG | HEART RATE: 75 BPM | HEIGHT: 62 IN | BODY MASS INDEX: 23.61 KG/M2 | RESPIRATION RATE: 16 BRPM | TEMPERATURE: 96.8 F | WEIGHT: 128.31 LBS | DIASTOLIC BLOOD PRESSURE: 56 MMHG | OXYGEN SATURATION: 98 %

## 2024-10-02 DIAGNOSIS — Z12.11 COLON CANCER SCREENING: ICD-10-CM

## 2024-10-02 PROBLEM — K21.9 GASTROESOPHAGEAL REFLUX DISEASE: Status: ACTIVE | Noted: 2024-10-02

## 2024-10-02 PROBLEM — Z98.890 PONV (POSTOPERATIVE NAUSEA AND VOMITING): Status: ACTIVE | Noted: 2024-10-02

## 2024-10-02 PROBLEM — R11.2 PONV (POSTOPERATIVE NAUSEA AND VOMITING): Status: ACTIVE | Noted: 2024-10-02

## 2024-10-02 PROBLEM — Z86.69 HISTORY OF MIGRAINE HEADACHES: Status: ACTIVE | Noted: 2024-10-02

## 2024-10-02 PROBLEM — E78.5 HYPERLIPIDEMIA: Status: ACTIVE | Noted: 2024-10-02

## 2024-10-02 PROBLEM — F32.A DEPRESSION: Status: ACTIVE | Noted: 2024-10-02

## 2024-10-02 PROCEDURE — 3700000001 HC GENERAL ANESTHESIA TIME - INITIAL BASE CHARGE

## 2024-10-02 PROCEDURE — A45385 PR COLONOSCOPY,REMV LESN,SNARE: Performed by: ANESTHESIOLOGY

## 2024-10-02 PROCEDURE — 7100000009 HC PHASE TWO TIME - INITIAL BASE CHARGE

## 2024-10-02 PROCEDURE — A45385 PR COLONOSCOPY,REMV LESN,SNARE: Performed by: NURSE ANESTHETIST, CERTIFIED REGISTERED

## 2024-10-02 PROCEDURE — 3700000002 HC GENERAL ANESTHESIA TIME - EACH INCREMENTAL 1 MINUTE

## 2024-10-02 PROCEDURE — 2500000004 HC RX 250 GENERAL PHARMACY W/ HCPCS (ALT 636 FOR OP/ED): Performed by: NURSE ANESTHETIST, CERTIFIED REGISTERED

## 2024-10-02 PROCEDURE — 7100000010 HC PHASE TWO TIME - EACH INCREMENTAL 1 MINUTE

## 2024-10-02 RX ORDER — FENTANYL CITRATE 50 UG/ML
INJECTION, SOLUTION INTRAMUSCULAR; INTRAVENOUS AS NEEDED
Status: DISCONTINUED | OUTPATIENT
Start: 2024-10-02 | End: 2024-10-02

## 2024-10-02 RX ORDER — PROPOFOL 10 MG/ML
INJECTION, EMULSION INTRAVENOUS AS NEEDED
Status: DISCONTINUED | OUTPATIENT
Start: 2024-10-02 | End: 2024-10-02

## 2024-10-02 RX ORDER — ONDANSETRON HYDROCHLORIDE 2 MG/ML
INJECTION, SOLUTION INTRAVENOUS AS NEEDED
Status: DISCONTINUED | OUTPATIENT
Start: 2024-10-02 | End: 2024-10-02

## 2024-10-02 RX ORDER — SODIUM CHLORIDE, SODIUM LACTATE, POTASSIUM CHLORIDE, CALCIUM CHLORIDE 600; 310; 30; 20 MG/100ML; MG/100ML; MG/100ML; MG/100ML
INJECTION, SOLUTION INTRAVENOUS CONTINUOUS PRN
Status: DISCONTINUED | OUTPATIENT
Start: 2024-10-02 | End: 2024-10-02

## 2024-10-02 RX ORDER — LIDOCAINE HYDROCHLORIDE 20 MG/ML
INJECTION, SOLUTION EPIDURAL; INFILTRATION; INTRACAUDAL; PERINEURAL AS NEEDED
Status: DISCONTINUED | OUTPATIENT
Start: 2024-10-02 | End: 2024-10-02

## 2024-10-02 SDOH — HEALTH STABILITY: MENTAL HEALTH: CURRENT SMOKER: 0

## 2024-10-02 ASSESSMENT — ENCOUNTER SYMPTOMS
CONSTIPATION: 0
TROUBLE SWALLOWING: 0
DIARRHEA: 0
SLEEP DISTURBANCE: 0
ARTHRALGIAS: 0
CHILLS: 0
COUGH: 0
DIFFICULTY URINATING: 0
JOINT SWELLING: 0
COLOR CHANGE: 0
ABDOMINAL DISTENTION: 0
FEVER: 0
WHEEZING: 0
DIZZINESS: 0
LIGHT-HEADEDNESS: 0
NAUSEA: 0
SHORTNESS OF BREATH: 0
UNEXPECTED WEIGHT CHANGE: 0
CONFUSION: 0
SPEECH DIFFICULTY: 0
HEADACHES: 0
VOMITING: 0
ABDOMINAL PAIN: 0

## 2024-10-02 ASSESSMENT — COLUMBIA-SUICIDE SEVERITY RATING SCALE - C-SSRS
6. HAVE YOU EVER DONE ANYTHING, STARTED TO DO ANYTHING, OR PREPARED TO DO ANYTHING TO END YOUR LIFE?: NO
2. HAVE YOU ACTUALLY HAD ANY THOUGHTS OF KILLING YOURSELF?: NO
1. IN THE PAST MONTH, HAVE YOU WISHED YOU WERE DEAD OR WISHED YOU COULD GO TO SLEEP AND NOT WAKE UP?: NO

## 2024-10-02 ASSESSMENT — PAIN SCALES - GENERAL
PAINLEVEL_OUTOF10: 0 - NO PAIN

## 2024-10-02 ASSESSMENT — PAIN - FUNCTIONAL ASSESSMENT
PAIN_FUNCTIONAL_ASSESSMENT: 0-10

## 2024-10-02 NOTE — ANESTHESIA POSTPROCEDURE EVALUATION
"Patient: Radha Puri \"Hilda\"    Procedure Summary       Date: 10/02/24 Room / Location: [facility]    Anesthesia Start: 0754 Anesthesia Stop: 0835    Procedure: COLONOSCOPY Diagnosis: Colon cancer screening    Scheduled Providers: Meghna Bess MD; YEN Anna DNP Responsible Provider: Black Bacon MD    Anesthesia Type: MAC ASA Status: 3            Anesthesia Type: MAC    Vitals Value Taken Time   /56 10/02/24 0835   Temp 36 10/02/24 0835   Pulse 99 10/02/24 0835   Resp 15 10/02/24 0835   SpO2 99 10/02/24 0835       Anesthesia Post Evaluation    Patient location during evaluation: PACU  Patient participation: complete - patient participated  Level of consciousness: awake and alert  Pain management: adequate  Airway patency: patent  Cardiovascular status: acceptable  Respiratory status: acceptable and spontaneous ventilation  Hydration status: acceptable  Postoperative Nausea and Vomiting: none      No notable events documented.    "

## 2024-10-02 NOTE — ANESTHESIA POSTPROCEDURE EVALUATION
"Patient: Radha Puri \"Hilda\"    Procedure Summary       Date: 10/02/24 Room / Location: [facility] [place]    Anesthesia Start: 0754 Anesthesia Stop:     Procedure: COLONOSCOPY Diagnosis: Colon cancer screening    Scheduled Providers: Meghna Bess MD; YEN Anna, DARIANA Responsible Provider: Black Bacon MD    Anesthesia Type: MAC ASA Status: 3            Anesthesia Type: MAC    Vitals Value Taken Time   BP See chart 10/02/24 0832   Temp See chart 10/02/24 0832   Pulse See chart 10/02/24 0832   Resp See chart 10/02/24 0832   SpO2 See chart 10/02/24 0832       Anesthesia Post Evaluation    Patient location during evaluation: bedside  Patient participation: complete - patient participated  Level of consciousness: sleepy but conscious  Pain management: adequate  Airway patency: patent  Cardiovascular status: acceptable  Respiratory status: acceptable  Hydration status: acceptable  Postoperative Nausea and Vomiting: none      No notable events documented.    "

## 2024-10-02 NOTE — H&P
"History Of Present Illness  Radha Puri \"Hilda\" is a 63 y.o. female presenting for screening colonoscopy. Hx of tubular adenoma on colonoscopy in 2014 but no polyps on last colonoscopy in 2019.       Past Medical History  Past Medical History:   Diagnosis Date    Acute bronchitis due to other specified organisms 08/06/2019    Acute bacterial bronchitis    Atelectasis 06/24/2013    Atelectasis    Carpal tunnel syndrome, unspecified upper limb 06/24/2013    Carpal tunnel syndrome    Chest pain, unspecified 04/02/2013    Chest pain    Chronic obstructive pulmonary disease, unspecified     Chronic obstructive pulmonary disease    Cutaneous abscess of face 04/02/2013    Abscess of face    Diverticulosis     Enthesopathy, unspecified 04/02/2013    Tendonitis    Erysipelas 04/02/2013    Erysipelas    Gastro-esophageal reflux disease with esophagitis, without bleeding 08/14/2014    Gastro-esophageal reflux disease with esophagitis    GERD (gastroesophageal reflux disease)     Hard to intubate     Headache, unspecified 04/02/2013    Headache    Larynx cancer (Multi)     surgery to remove only    Migraine, unspecified, not intractable, without status migrainosus 11/28/2018    Headache, migraine    Occipital neuralgia     Other conditions influencing health status 06/24/2013    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    Pain in leg, unspecified 06/24/2013    Lower extremity pain    Personal history of malignant neoplasm of unspecified site of lip, oral cavity, and pharynx 08/14/2014    History of malignant neoplasm of pharynx    Personal history of other diseases of the digestive system 04/26/2019    History of glossitis    Personal history of other specified conditions 06/09/2014    History of heartburn    Personal history of other specified conditions 06/12/2018    History of dyspnea    Personal history of pneumonia (recurrent) 01/23/2014    History of pneumonia    Sprain of joints and ligaments of unspecified " parts of neck, initial encounter 04/02/2013    Neck sprain    Zoster without complications 04/02/2013    Herpes zoster     Surgical History  Past Surgical History:   Procedure Laterality Date    BREAST BIOPSY Right     benign    BUNIONECTOMY Left     CERVICAL DISCECTOMY  11/07/2013    Spinal Diskectomy Cervical    CERVICAL FUSION  09/12/2013    Cervical Vertebral Fusion    EYE SURGERY Bilateral 11/07/2013    Eye Surgery    KNEE ARTHROSCOPY W/ DEBRIDEMENT Right 11/07/2013    Arthroscopy Knee Right    OTHER SURGICAL HISTORY  08/16/2014    Throat Surgery to reove larynx cancer (several times)    OTHER SURGICAL HISTORY      occipital nerve stimulator to left upper chest    OTHER SURGICAL HISTORY      several cervical injections for pain    ROTATOR CUFF REPAIR Right 09/05/2013    Rotator Cuff Repair x2    SINUS SURGERY  11/07/2013    Sinus Surgery septoplasty x 2, nasal fracture x 2     Social History  She reports that she quit smoking about 30 years ago. Her smoking use included cigarettes. She started smoking about 50 years ago. She has a 20 pack-year smoking history. She has been exposed to tobacco smoke. She has never used smokeless tobacco. She reports current alcohol use of about 2.0 standard drinks of alcohol per week. She reports current drug use. Drug: Marijuana.    Family History  Family History   Problem Relation Name Age of Onset    Cirrhosis Mother      Lung cancer Father          Allergies  Allergies   Allergen Reactions    Keflex [Cephalexin] Anxiety and Insomnia    Doxycycline Diarrhea     Review of Systems   Constitutional:  Negative for chills, fever and unexpected weight change.   HENT:  Negative for congestion and trouble swallowing.    Respiratory:  Negative for cough, shortness of breath and wheezing.    Cardiovascular:  Negative for chest pain.   Gastrointestinal:  Negative for abdominal distention, abdominal pain, constipation, diarrhea, nausea and vomiting.   Genitourinary:  Negative for  "difficulty urinating.   Musculoskeletal:  Negative for arthralgias and joint swelling.   Skin:  Negative for color change.   Neurological:  Negative for dizziness, speech difficulty, light-headedness and headaches.   Psychiatric/Behavioral:  Negative for confusion and sleep disturbance.         Physical Exam  Constitutional:       General: She is not in acute distress.     Appearance: Normal appearance.   HENT:      Head: Normocephalic.      Nose: Nose normal.   Eyes:      General: No scleral icterus.     Extraocular Movements: Extraocular movements intact.      Conjunctiva/sclera: Conjunctivae normal.      Pupils: Pupils are equal, round, and reactive to light.   Cardiovascular:      Rate and Rhythm: Normal rate and regular rhythm.   Pulmonary:      Effort: Pulmonary effort is normal.   Abdominal:      General: Abdomen is flat. Bowel sounds are normal. There is no distension.      Palpations: Abdomen is soft.   Skin:     General: Skin is warm.      Coloration: Skin is not jaundiced.   Neurological:      General: No focal deficit present.      Mental Status: She is alert.   Psychiatric:         Mood and Affect: Mood normal.          Last Recorded Vitals  Blood pressure 129/67, pulse 68, temperature 36 °C (96.8 °F), temperature source Temporal, resp. rate 16, height 1.575 m (5' 2\"), weight 58.2 kg (128 lb 4.9 oz), SpO2 98%.    Assessment/Plan   Encounter for screening colonoscopy/Hx of Tubular Adenoma 2014  - proceed with colonoscopy      Meghna Bess MD  "

## 2024-10-02 NOTE — DISCHARGE INSTRUCTIONS
Patient Instructions after a Colonoscopy      The anesthetics, sedatives or narcotics which were given to you today will be acting in your body for the next 24 hours, so you might feel a little sleepy or groggy.  This feeling should slowly wear off. Carefully read and follow the instructions.     You received sedation today:  - Do not drive or operate any machinery or power tools of any kind.   - No alcoholic beverages today, not even beer or wine.  - Do not make any important decisions or sign any legal documents.  - No over the counter medications that contain alcohol or that may cause drowsiness.  - Do not make any important decisions or sign any legal documents.  - Make sure you have someone with you for first 24 hours.    While it is common to experience mild to moderate abdominal distention, gas, or belching after your procedure, if any of these symptoms occur following discharge from the GI Lab or within one week of having your procedure, call the [facility] to be advised whether a visit to your nearest Urgent Care or Emergency Department is indicated.  Take this paper with you if you go.     - If you develop an allergic reaction to the medications that were given during your procedure such as difficulty breathing, rash, hives, severe nausea, vomiting or lightheadedness.  - If you experience chest pain, shortness of breath, severe abdominal pain, fevers and chills.  -If you develop signs and symptoms of bleeding such as blood in your spit, if your stools turn black, tarry, or bloody  - If you have not urinated within 8 hours following your procedure.  - If your IV site becomes painful, red, inflamed, or looks infected.    If you received a biopsy/polypectomy/sphincterotomy the following instructions apply below:    __ Do not use Aspirin containing products, non-steroidal medications or anti-coagulants for one week following your procedure. (Examples of these types of medications are: Advil,  Arthrotec, Aleve, Coumadin, Ecotrin, Heparin, Ibuprofen, Indocin, Motrin, Naprosyn, Nuprin, Plavix, Vioxx, and Voltarin, or their generic forms.  This list is not all-inclusive.  Check with your physician or pharmacist before resuming medications.)   __ Eat a soft diet today.  Avoid foods that are poorly digested for the next 24 hours.  These foods would include: nuts, beans, lettuce, red meats, and fried foods. Start with liquids and advance your diet as tolerated, gradually work up to eating solids.   __ Do not have a Barium Study or Enema for one week.    Your physician recommends the additional following instructions:    -You have a contact number available for emergencies. The signs and symptoms of potential delayed complications were discussed with you. You may return to normal activities tomorrow.  -Resume your previous diet.  -Continue your present medications.   -We are waiting for your pathology results.  -Your physician has recommended a repeat colonoscopy (date to be determined after pending pathology results are reviewed) for surveillance based on pathology results.  -The findings and recommendations have been discussed with you.  -The findings and recommendations were discussed with your family.  - Please see Medication Reconciliation Form for new medication/medications prescribed.       If you experience any problems or have any questions following discharge from the GI Lab, please call:        Nurse Signature                                                                        Date___________________                                                                            Patient/Responsible Party Signature                                        Date___________________

## 2024-10-02 NOTE — ANESTHESIA PREPROCEDURE EVALUATION
"Patient: Radha Puri \"Hilda\"    Procedure Information       Date/Time: 10/02/24 0730    Scheduled providers: Meghna Bess MD; YEN Anna DNP    Procedure: COLONOSCOPY    Location: [facility]            Relevant Problems   Anesthesia   (+) PONV (postoperative nausea and vomiting)      Cardiac   (+) Hyperlipidemia      Neuro  Occipital nerve stimulator.   (+) Depression   (+) History of migraine headaches      GI   (+) Gastroesophageal reflux disease      Musculoskeletal   (+) Primary osteoarthritis of right hip       Clinical information reviewed:   Tobacco  Allergies  Meds   Med Hx  Surg Hx  OB Status  Fam Hx  Soc   Hx        NPO Detail:  NPO/Void Status  Date of Last Liquid: 10/02/24  Time of Last Liquid: 0200  Date of Last Solid: 10/01/24  Time of Last Solid: 1000         Physical Exam    Airway  Mallampati: II  TM distance: >3 FB  Neck ROM: full  Comments: History of difficult airway.   Cardiovascular    Dental - normal exam     Pulmonary    Abdominal            Anesthesia Plan    History of general anesthesia?: yes  History of complications of general anesthesia?: yes    ASA 3     MAC     The patient is not a current smoker.  Patient did not smoke on day of procedure.    intravenous induction   Postoperative administration of opioids is intended.  Anesthetic plan and risks discussed with patient.    Risks, benefits, and alternatives of monitored anesthesia care with deep sedation were explained in detail to the patient.  Specifically, the patient was told that MAC with sedation is not general anesthesia, and that memory of intraprocedural events may be possible despite the sedation drugs administered.  I explained to the patient that the role of the anesthesia team is to monitor and adjust the patient's level of sedation to provide the maximum level of comfort with keeping safety as the highest priority, but that general anesthesia with complete amnesia is not a " realistic expectation.  Conversion to general anesthesia if required was also described should the procedure conditions warrant.  The patient indicated understanding and agreed to proceed.    Black Bacon MD   Complex Repair And Transposition Flap Text: The defect edges were debeveled with a #15 scalpel blade.  The primary defect was closed partially with a complex linear closure.  Given the location of the remaining defect, shape of the defect and the proximity to free margins a transposition flap was deemed most appropriate for complete closure of the defect.  Using a sterile surgical marker, an appropriate advancement flap was drawn incorporating the defect and placing the expected incisions within the relaxed skin tension lines where possible.    The area thus outlined was incised deep to adipose tissue with a #15 scalpel blade.  The skin margins were undermined to an appropriate distance in all directions utilizing iris scissors.

## 2024-10-09 ENCOUNTER — APPOINTMENT (OUTPATIENT)
Dept: ORTHOPEDIC SURGERY | Facility: CLINIC | Age: 64
End: 2024-10-09
Payer: COMMERCIAL

## 2024-10-09 DIAGNOSIS — M19.032 CMC DJD(CARPOMETACARPAL DEGENERATIVE JOINT DISEASE), LOCALIZED PRIMARY, LEFT: Primary | ICD-10-CM

## 2024-10-09 DIAGNOSIS — M19.031 CMC DJD(CARPOMETACARPAL DEGENERATIVE JOINT DISEASE), LOCALIZED PRIMARY, RIGHT: ICD-10-CM

## 2024-10-09 LAB
LABORATORY COMMENT REPORT: NORMAL
PATH REPORT.FINAL DX SPEC: NORMAL
PATH REPORT.GROSS SPEC: NORMAL
PATH REPORT.TOTAL CANCER: NORMAL

## 2024-10-09 PROCEDURE — 99214 OFFICE O/P EST MOD 30 MIN: CPT | Performed by: ORTHOPAEDIC SURGERY

## 2024-10-09 PROCEDURE — 20600 DRAIN/INJ JOINT/BURSA W/O US: CPT | Performed by: ORTHOPAEDIC SURGERY

## 2024-10-09 PROCEDURE — 1036F TOBACCO NON-USER: CPT | Performed by: ORTHOPAEDIC SURGERY

## 2024-10-09 RX ORDER — LIDOCAINE HYDROCHLORIDE 10 MG/ML
1 INJECTION, SOLUTION INFILTRATION; PERINEURAL
Status: COMPLETED | OUTPATIENT
Start: 2024-10-09 | End: 2024-10-09

## 2024-10-09 RX ORDER — TRIAMCINOLONE ACETONIDE 40 MG/ML
40 INJECTION, SUSPENSION INTRA-ARTICULAR; INTRAMUSCULAR
Status: COMPLETED | OUTPATIENT
Start: 2024-10-09 | End: 2024-10-09

## 2024-10-09 NOTE — PROGRESS NOTES
63 y.o. female presents today for follow up of bilateral base of thumb pain. The patient reports symptoms for months, getting worse recently. Pain is controlled. Patient reports no numbness and tingling.  Reports no previous surgeries or trauma to the area.  Reports pain worse with use, better at rest.   Pain dull ache, sharp at times.   Pain worse opening bottles and jars.  Right worse than left.  Does report cutting her thumb a couple weeks ago on the left but doing well.      Review of Systems    Constitutional: see HPI, no fever, no chills, not feeling tired, no significant weight gain or weight loss.   Eyes: No vision changes  ENT: no nosebleeds.   Cardiovascular: no chest pain.   Respiratory: no shortness of breath and no cough.   Gastrointestinal: no abdominal pain, no nausea, no vomiting and no diarrhea.   Musculoskeletal: per HPI  Neurological: no headache, no gait disturbances  Psychiatric: no depression and no sleep disturbances.   Endocrine: no muscle weakness and no muscle cramps.   Hematologic/Lymphatic: no swollen glands and no tendency for easy bruising or excessive swelling.     Patient's past medical history, past surgical history, allergies, and medications have been reviewed unless otherwise noted in the chart.     CMC Arthritis Exam  Inspection:  no evidence of infection, no edema, no erythema, no ecchymosis, Palpation:  compartments are soft, pain with palpation over the Thumb CMC joint, Range of Motion:  full wrist and finger range of motion, Stability:  no wrist or finger instability detected, Strength:  5/5  and pinch strength, Skin:  intact, Vascular:  capillary refill <2 seconds distally, Sensation:  intact to light touch distally, Tests:  negative Finkelstein's , positive Thumb CMC Grind.      Constitutional   General appearance: Alert and in no acute distress. Well developed, well nourished.    Eyes   External Eye, Conjunctiva and lids: Normal external exam - pupils were equal in  size, round, reactive to light (PERRL) with normal accommodation and extraocular movements intact (EOMI).   Ears, Nose, Mouth, and Throat   Hearing: Normal.   Neck   Neck: No neck mass was observed. Supple.   Pulmonary   Respiratory effort: No respiratory distress.   Cardiovascular   Examination of extremities: No peripheral edema.   Psychiatric   Judgment and insight: Intact.   Orientation to person, place, and time: Alert and oriented x 3.       Mood and affect: Normal.      X-rays of the bilateral hands show moderate CMC arthritis    Bilateral thumb CMC arthritis  Based on the history, physical exam and imaging studies above, the patient's presentation is consistent with the above diagnosis.  I had a long discussion with the patient regarding their presentation and the treatment options.  We discussed initial nonoperative versus operative management options as well as potential further diagnostic imaging.  We again discussed her treatment options going forward along with their associated risks and benefits. After thorough discussion, the patient has elected to proceed with conservative management. All questions were answered to the patients satisfaction who seems satisfied with the plan.  They will call the office with any questions/concerns.    Discussion I discussed the diagnosis and treatment options with the patient today along with their associated risks and benefits. After thorough discussion, the patient has elected to proceed with a corticosteroid injection with Kenalog and Xylocaine, which was performed in the office today under aseptic technique and the patient tolerated the procedure well.          Ortho Injections:           Injection site bilateral thumb CMC. Medication 1 cc 1% Xylocaine, 1 cc 40 mg Kenalog, Injection given under sterile conditions. No immediate complications noted. Post injection instructions given.  Comfort Cool as needed  Over-the-counter anti-inflammatories as needed  Follow-up 4  "months as needed no x-ray    Patient ID: Radha Puri \"Hilda\" is a 63 y.o. female.    S Inj/Asp: bilateral thumb CMC on 10/9/2024 8:17 AM  Indications: pain  Details: 25 G needle (dorsoradial) approach  Medications (Right): 40 mg triamcinolone acetonide 40 mg/mL; 1 mL lidocaine 10 mg/mL (1 %)  Medications (Left): 40 mg triamcinolone acetonide 40 mg/mL; 1 mL lidocaine 10 mg/mL (1 %)  Outcome: tolerated well, no immediate complications  Procedure, treatment alternatives, risks and benefits explained, specific risks discussed. Consent was given by the patient. Immediately prior to procedure a time out was called to verify the correct patient, procedure, equipment, support staff and site/side marked as required. Patient was prepped and draped in the usual sterile fashion.           "

## 2024-10-09 NOTE — PROGRESS NOTES
Bilateral CMC DJD follow up last injection 5/2024 patient has has some recent falls but no injury. She would like to try another set of injections

## 2024-10-11 NOTE — RESULT ENCOUNTER NOTE
Polyp removed is hyperplastic. Given no polyps on last colonoscopy in 2019 and one adenoma in 2014 current guidelines would recommend a repeat colonoscopy in 10 yrs

## 2024-10-24 ENCOUNTER — OFFICE VISIT (OUTPATIENT)
Dept: PRIMARY CARE | Facility: CLINIC | Age: 64
End: 2024-10-24
Payer: COMMERCIAL

## 2024-10-24 VITALS
OXYGEN SATURATION: 97 % | SYSTOLIC BLOOD PRESSURE: 140 MMHG | WEIGHT: 131 LBS | TEMPERATURE: 97.7 F | BODY MASS INDEX: 23.96 KG/M2 | DIASTOLIC BLOOD PRESSURE: 80 MMHG | HEART RATE: 94 BPM

## 2024-10-24 DIAGNOSIS — J40 BRONCHITIS: Primary | ICD-10-CM

## 2024-10-24 DIAGNOSIS — R68.2 DRY MOUTH: ICD-10-CM

## 2024-10-24 DIAGNOSIS — J45.30 MILD PERSISTENT ASTHMA WITHOUT COMPLICATION (HHS-HCC): ICD-10-CM

## 2024-10-24 PROBLEM — Z98.1 STATUS POST CERVICAL SPINAL FUSION: Status: ACTIVE | Noted: 2024-10-24

## 2024-10-24 PROBLEM — J98.11 PULMONARY ATELECTASIS: Status: ACTIVE | Noted: 2024-10-24

## 2024-10-24 PROBLEM — S63.619A SPRAIN OF FINGER, RIGHT, INITIAL ENCOUNTER: Status: ACTIVE | Noted: 2024-10-24

## 2024-10-24 PROBLEM — J45.909 ASTHMA: Status: ACTIVE | Noted: 2024-10-24

## 2024-10-24 PROBLEM — S69.91XA HAND INJURY, RIGHT, INITIAL ENCOUNTER: Status: ACTIVE | Noted: 2024-10-24

## 2024-10-24 PROBLEM — M43.10 ACQUIRED SPONDYLOLISTHESIS: Status: ACTIVE | Noted: 2024-10-24

## 2024-10-24 PROBLEM — R06.02 SHORTNESS OF BREATH: Status: ACTIVE | Noted: 2024-10-24

## 2024-10-24 PROBLEM — M25.562 LEFT KNEE PAIN: Status: ACTIVE | Noted: 2023-09-10

## 2024-10-24 PROBLEM — Z96.89 SPINAL CORD STIMULATOR STATUS: Status: ACTIVE | Noted: 2024-10-24

## 2024-10-24 PROBLEM — S39.012A LUMBAR STRAIN: Status: ACTIVE | Noted: 2024-10-24

## 2024-10-24 PROBLEM — K21.9 ESOPHAGEAL REFLUX: Status: ACTIVE | Noted: 2024-10-24

## 2024-10-24 PROBLEM — J38.7 DISEASE OF LARYNX: Status: ACTIVE | Noted: 2024-10-24

## 2024-10-24 PROBLEM — F41.8 SITUATIONAL ANXIETY: Status: ACTIVE | Noted: 2024-10-24

## 2024-10-24 PROBLEM — R49.0 MUSCLE TENSION DYSPHONIA: Status: ACTIVE | Noted: 2024-10-24

## 2024-10-24 PROBLEM — M25.552 LEFT HIP PAIN: Status: ACTIVE | Noted: 2024-10-24

## 2024-10-24 PROBLEM — R60.9 BODY FLUID RETENTION: Status: ACTIVE | Noted: 2024-10-24

## 2024-10-24 PROBLEM — F45.42 PAIN DISORDER ASSOCIATED WITH PSYCHOLOGICAL AND PHYSICAL FACTORS: Status: ACTIVE | Noted: 2024-10-24

## 2024-10-24 PROBLEM — R63.5 WEIGHT GAIN: Status: ACTIVE | Noted: 2024-10-24

## 2024-10-24 PROBLEM — R09.89 THROAT CLEARING: Status: ACTIVE | Noted: 2024-10-24

## 2024-10-24 PROBLEM — M77.9 TENDINITIS: Status: ACTIVE | Noted: 2024-10-24

## 2024-10-24 PROBLEM — R13.12 DYSPHAGIA, OROPHARYNGEAL PHASE: Status: ACTIVE | Noted: 2024-10-24

## 2024-10-24 PROBLEM — M48.062 LUMBAR STENOSIS WITH NEUROGENIC CLAUDICATION: Status: ACTIVE | Noted: 2024-10-24

## 2024-10-24 PROBLEM — R07.9 CHEST PAIN: Status: ACTIVE | Noted: 2024-10-24

## 2024-10-24 PROBLEM — J44.9 CHRONIC OBSTRUCTIVE PULMONARY DISEASE (MULTI): Status: ACTIVE | Noted: 2024-10-24

## 2024-10-24 PROBLEM — F41.9 ANXIETY: Status: ACTIVE | Noted: 2024-10-24

## 2024-10-24 PROBLEM — D02.0 CARCINOMA IN SITU OF LARYNX: Status: ACTIVE | Noted: 2024-10-24

## 2024-10-24 PROBLEM — S89.91XA RIGHT KNEE INJURY: Status: ACTIVE | Noted: 2024-10-24

## 2024-10-24 PROBLEM — M72.0 DUPUYTREN'S DISEASE OF PALM: Status: ACTIVE | Noted: 2024-10-24

## 2024-10-24 PROBLEM — E66.9 ADULT-ONSET OBESITY: Status: ACTIVE | Noted: 2024-10-24

## 2024-10-24 PROBLEM — M25.559 ARTHRALGIA OF HIP: Status: ACTIVE | Noted: 2024-10-24

## 2024-10-24 PROBLEM — M62.838 CERVICAL PARASPINAL MUSCLE SPASM: Status: ACTIVE | Noted: 2024-10-24

## 2024-10-24 PROBLEM — G89.29 CHRONIC LEFT-SIDED LOW BACK PAIN WITH LEFT-SIDED SCIATICA: Status: ACTIVE | Noted: 2024-10-24

## 2024-10-24 PROBLEM — S39.012A STRAIN OF LUMBAR REGION: Status: ACTIVE | Noted: 2024-10-24

## 2024-10-24 PROBLEM — H91.92 HEARING LOSS, LEFT: Status: ACTIVE | Noted: 2024-10-24

## 2024-10-24 PROBLEM — Z85.819 HISTORY OF MALIGNANT NEOPLASM OF PHARYNX: Status: ACTIVE | Noted: 2024-10-24

## 2024-10-24 PROBLEM — J45.901 ASTHMA EXACERBATION (HHS-HCC): Status: ACTIVE | Noted: 2024-10-24

## 2024-10-24 PROBLEM — M79.606 PAIN OF LOWER EXTREMITY: Status: ACTIVE | Noted: 2024-10-24

## 2024-10-24 PROBLEM — M65.4 RADIAL STYLOID TENOSYNOVITIS OF RIGHT HAND: Status: ACTIVE | Noted: 2024-10-24

## 2024-10-24 PROBLEM — B02.9 HERPES ZOSTER: Status: ACTIVE | Noted: 2024-10-24

## 2024-10-24 PROBLEM — J03.90 TONSILLITIS: Status: ACTIVE | Noted: 2024-10-24

## 2024-10-24 PROBLEM — M77.8 FLEXOR TENDINITIS OF HAND: Status: ACTIVE | Noted: 2024-10-24

## 2024-10-24 PROBLEM — M41.9 ACQUIRED SCOLIOSIS: Status: ACTIVE | Noted: 2024-10-24

## 2024-10-24 PROBLEM — D64.9 ANEMIA: Status: ACTIVE | Noted: 2024-10-24

## 2024-10-24 PROBLEM — Z98.890 POSTPROCEDURAL STATE: Status: ACTIVE | Noted: 2024-10-24

## 2024-10-24 PROBLEM — M54.42 CHRONIC LEFT-SIDED LOW BACK PAIN WITH LEFT-SIDED SCIATICA: Status: ACTIVE | Noted: 2024-10-24

## 2024-10-24 PROBLEM — E03.8 SUBCLINICAL HYPOTHYROIDISM: Status: ACTIVE | Noted: 2024-10-24

## 2024-10-24 PROBLEM — M54.81 CERVICO-OCCIPITAL NEURALGIA: Status: ACTIVE | Noted: 2024-10-24

## 2024-10-24 PROBLEM — F45.41 PSYCHOGENIC PAIN: Status: ACTIVE | Noted: 2024-10-24

## 2024-10-24 PROBLEM — H10.9 CONJUNCTIVITIS: Status: ACTIVE | Noted: 2024-10-24

## 2024-10-24 PROBLEM — S82.002A CLOSED FRACTURE OF LEFT PATELLA: Status: ACTIVE | Noted: 2024-10-24

## 2024-10-24 PROBLEM — J38.7: Status: ACTIVE | Noted: 2024-10-24

## 2024-10-24 PROBLEM — S89.90XA KNEE INJURY: Status: ACTIVE | Noted: 2024-10-24

## 2024-10-24 PROBLEM — M79.644 PAIN OF RIGHT THUMB: Status: ACTIVE | Noted: 2024-10-24

## 2024-10-24 PROBLEM — G56.00 CARPAL TUNNEL SYNDROME: Status: ACTIVE | Noted: 2024-10-24

## 2024-10-24 PROBLEM — R51.9 HEADACHE: Status: ACTIVE | Noted: 2024-10-24

## 2024-10-24 PROBLEM — L98.9 INFLAMMATORY DERMATOSIS: Status: ACTIVE | Noted: 2024-10-24

## 2024-10-24 PROBLEM — R05.9 COUGH IN ADULT: Status: ACTIVE | Noted: 2024-10-24

## 2024-10-24 PROBLEM — R49.0 DYSPHONIA: Status: ACTIVE | Noted: 2024-10-24

## 2024-10-24 PROBLEM — R49.0 HOARSENESS OF VOICE: Status: ACTIVE | Noted: 2024-10-24

## 2024-10-24 PROBLEM — M65.4 DE QUERVAIN'S TENOSYNOVITIS, RIGHT: Status: ACTIVE | Noted: 2024-10-24

## 2024-10-24 PROBLEM — K11.7 XEROSTOMIA DUE TO HYPOSECRETION OF SALIVARY GLAND: Status: ACTIVE | Noted: 2024-10-24

## 2024-10-24 PROCEDURE — 99214 OFFICE O/P EST MOD 30 MIN: CPT | Performed by: NURSE PRACTITIONER

## 2024-10-24 RX ORDER — CEVIMELINE HYDROCHLORIDE 30 MG/1
30 CAPSULE ORAL 3 TIMES DAILY
Qty: 90 CAPSULE | Refills: 3 | Status: SHIPPED | OUTPATIENT
Start: 2024-10-24 | End: 2024-10-24 | Stop reason: SDUPTHER

## 2024-10-24 RX ORDER — AZITHROMYCIN 250 MG/1
TABLET, FILM COATED ORAL
Qty: 6 TABLET | Refills: 0 | Status: SHIPPED | OUTPATIENT
Start: 2024-10-24 | End: 2024-10-29

## 2024-10-24 RX ORDER — BENZONATATE 200 MG/1
200 CAPSULE ORAL 3 TIMES DAILY PRN
Qty: 42 CAPSULE | Refills: 0 | Status: SHIPPED | OUTPATIENT
Start: 2024-10-24 | End: 2024-11-23

## 2024-10-24 RX ORDER — CEVIMELINE HYDROCHLORIDE 30 MG/1
30 CAPSULE ORAL 3 TIMES DAILY
Qty: 270 CAPSULE | Refills: 3 | Status: SHIPPED | OUTPATIENT
Start: 2024-10-24

## 2024-10-24 ASSESSMENT — PATIENT HEALTH QUESTIONNAIRE - PHQ9
SUM OF ALL RESPONSES TO PHQ9 QUESTIONS 1 AND 2: 0
2. FEELING DOWN, DEPRESSED OR HOPELESS: NOT AT ALL
1. LITTLE INTEREST OR PLEASURE IN DOING THINGS: NOT AT ALL

## 2024-10-24 NOTE — PROGRESS NOTES
Subjective   Patient ID: Hilda Puri is a 63 y.o. female who presents for Cough (Wheezing, congested,fatigue).    HPI   Hx of asthma and had been on inhaler but w  Going away with some friends for weekend  Concerned about contagiousness  Monday noticed sore throat  Next day lots of green mucous  No sleep last night  Wheezy    Has a nebulizer - sometimes helps  Needs refill on Rx for dry mouth  Review of Systems   Constitutional:  Positive for activity change and fatigue.   HENT:  Positive for congestion and sinus pressure.    Respiratory:  Positive for cough, chest tightness and wheezing.    All other systems reviewed and are negative.      Objective   /80   Pulse 94   Temp 36.5 °C (97.7 °F)   Wt 59.4 kg (131 lb)   SpO2 97%   BMI 23.96 kg/m²     Physical Exam  Vitals and nursing note reviewed.   Constitutional:       Appearance: Normal appearance.   HENT:      Head: Normocephalic and atraumatic.      Right Ear: Tympanic membrane, ear canal and external ear normal.      Left Ear: Tympanic membrane, ear canal and external ear normal.      Nose: Congestion present.      Mouth/Throat:      Comments: + PND  Cardiovascular:      Rate and Rhythm: Normal rate and regular rhythm.      Pulses: Normal pulses.      Heart sounds: Normal heart sounds.   Pulmonary:      Effort: Pulmonary effort is normal.      Comments: + Wheeze in lower lobes LOGAN  clears with cough  + cough  Neurological:      Mental Status: She is alert and oriented to person, place, and time.   Psychiatric:         Mood and Affect: Mood normal.         Behavior: Behavior normal.         Thought Content: Thought content normal.         Judgment: Judgment normal.         Assessment/Plan   Assessment & Plan  Bronchitis    Orders:    azithromycin (Zithromax) 250 mg tablet; Take 2 tablets (500 mg) by mouth once daily for 1 day, THEN 1 tablet (250 mg) once daily for 4 days. Take 2 tabs (500 mg) by mouth today, than 1 daily for 4 days..    benzonatate  (Tessalon) 200 mg capsule; Take 1 capsule (200 mg) by mouth 3 times a day as needed for cough. Do not crush or chew.    Dry mouth    Orders:    cevimeline (Evoxac) 30 mg capsule; Take 1 capsule (30 mg) by mouth 3 times a day.    Mild persistent asthma without complication ([facility])  Will tx with steroid and resume daily steroid use

## 2024-10-28 PROBLEM — J45.901 ASTHMA EXACERBATION (HHS-HCC): Status: RESOLVED | Noted: 2024-10-24 | Resolved: 2024-10-28

## 2024-10-28 PROBLEM — R68.2 DRY MOUTH: Status: ACTIVE | Noted: 2024-10-24

## 2024-10-28 PROBLEM — J40 BRONCHITIS: Status: ACTIVE | Noted: 2024-10-28

## 2024-10-28 ASSESSMENT — ENCOUNTER SYMPTOMS
COUGH: 1
ACTIVITY CHANGE: 1
FATIGUE: 1
WHEEZING: 1
SINUS PRESSURE: 1
CHEST TIGHTNESS: 1

## 2024-10-28 NOTE — ASSESSMENT & PLAN NOTE
Orders:    azithromycin (Zithromax) 250 mg tablet; Take 2 tablets (500 mg) by mouth once daily for 1 day, THEN 1 tablet (250 mg) once daily for 4 days. Take 2 tabs (500 mg) by mouth today, than 1 daily for 4 days..    benzonatate (Tessalon) 200 mg capsule; Take 1 capsule (200 mg) by mouth 3 times a day as needed for cough. Do not crush or chew.

## 2024-12-16 DIAGNOSIS — M16.11 PRIMARY OSTEOARTHRITIS OF RIGHT HIP: ICD-10-CM

## 2024-12-16 RX ORDER — CYCLOBENZAPRINE HCL 10 MG
TABLET ORAL
Qty: 30 TABLET | Refills: 3 | Status: SHIPPED | OUTPATIENT
Start: 2024-12-16

## 2024-12-17 ENCOUNTER — HOSPITAL ENCOUNTER (OUTPATIENT)
Dept: RADIOLOGY | Facility: CLINIC | Age: 64
Discharge: HOME | End: 2024-12-17
Payer: COMMERCIAL

## 2024-12-17 ENCOUNTER — APPOINTMENT (OUTPATIENT)
Dept: ORTHOPEDIC SURGERY | Facility: CLINIC | Age: 64
End: 2024-12-17
Payer: COMMERCIAL

## 2024-12-17 VITALS — BODY MASS INDEX: 24.11 KG/M2 | WEIGHT: 131 LBS | HEIGHT: 62 IN

## 2024-12-17 DIAGNOSIS — M25.551 RIGHT HIP PAIN: ICD-10-CM

## 2024-12-17 DIAGNOSIS — M70.62 TROCHANTERIC BURSITIS OF LEFT HIP: ICD-10-CM

## 2024-12-17 PROCEDURE — 73502 X-RAY EXAM HIP UNI 2-3 VIEWS: CPT | Mod: RIGHT SIDE | Performed by: RADIOLOGY

## 2024-12-17 PROCEDURE — 99214 OFFICE O/P EST MOD 30 MIN: CPT | Performed by: ORTHOPAEDIC SURGERY

## 2024-12-17 PROCEDURE — 20610 DRAIN/INJ JOINT/BURSA W/O US: CPT | Performed by: ORTHOPAEDIC SURGERY

## 2024-12-17 PROCEDURE — 73502 X-RAY EXAM HIP UNI 2-3 VIEWS: CPT | Mod: RT

## 2024-12-17 PROCEDURE — 3008F BODY MASS INDEX DOCD: CPT | Performed by: ORTHOPAEDIC SURGERY

## 2024-12-17 PROCEDURE — 1159F MED LIST DOCD IN RCRD: CPT | Performed by: ORTHOPAEDIC SURGERY

## 2024-12-17 PROCEDURE — 1123F ACP DISCUSS/DSCN MKR DOCD: CPT | Performed by: ORTHOPAEDIC SURGERY

## 2024-12-17 PROCEDURE — 1036F TOBACCO NON-USER: CPT | Performed by: ORTHOPAEDIC SURGERY

## 2024-12-17 PROCEDURE — 1157F ADVNC CARE PLAN IN RCRD: CPT | Performed by: ORTHOPAEDIC SURGERY

## 2024-12-17 RX ORDER — TRIAMCINOLONE ACETONIDE 40 MG/ML
80 INJECTION, SUSPENSION INTRA-ARTICULAR; INTRAMUSCULAR
Status: COMPLETED | OUTPATIENT
Start: 2024-12-17 | End: 2024-12-17

## 2024-12-17 ASSESSMENT — PAIN - FUNCTIONAL ASSESSMENT: PAIN_FUNCTIONAL_ASSESSMENT: NO/DENIES PAIN

## 2024-12-17 NOTE — PROGRESS NOTES
ORTHOPEDIC TOTAL JOINT  POST-OPERATIVE FOLLOW UP      ============================  IMPRESSION/PLAN:  ============================  64 y.o. female s/p Right Total Hip Replacement completed on 12/21/2023, Left hip trochanteric bursitis, and Left hip abductor tendinitis    PLAN:  Patient is doing very well with her right hip replacement, no issues at this time.  Is getting increasing pain with the left hip trochanteric bursa.  Did review that some of her pain could be related to her scoliosis and arthritic changes in her low back but would like to try injection once again for her bursitis symptoms and start formal physical therapy after the new year.  Will proceed with injection below.  Repeat every 3 to 4 months.  Arrange for formal therapy today.  Will follow-up as needed for the right hip    L Inj/Asp: R greater trochanteric bursa on 12/17/2024 12:23 PM  Indications: pain  Details: 25 G needle, lateral approach  Medications: 80 mg triamcinolone acetonide 40 mg/mL  Outcome: tolerated well, no immediate complications  Procedure, treatment alternatives, risks and benefits explained, specific risks discussed. Consent was given by the patient.           Radha Puri presents today for follow up of joint replacement above. Pain controlled with current treatment plan. Patient has completed physical therapy. They are currently ambulating with no assisted devices. Overall, her right hip is doing well and she has no concerns. XR done today.   She had concerns with her left hip and would like to discuss options. She was given a L hip greater trochanteric bursa CSI on 10/1/2024 which only helped a little bit.  Would like to potentially consider restarting physical therapy.  Has known history of low back pain and is seeing spine for this.    Review of Systems:   Constitutional: See HPI for pain assessment, No significant weight loss, recent trauma. Denies fevers/chills  Cardiovascular: No chest pain, shortness of  breath  Respiratory: No difficulty breathing, cough  Gastrointestinal: No nausea, vomiting, diarrhea, constipation  Musculoskeletal: Noted in HPI, no arthralgias   Integumentary: No rashes, easy bruising, redness   Neurological: no numbness or tingling in extremities, no gait disturbances     Patient Active Problem List   Diagnosis    Insomnia    Hyperglycemia    Elevated serum creatinine    Acute pain of left knee    Arthritis of right knee    Primary osteoarthritis of right hip    PONV (postoperative nausea and vomiting)    Gastroesophageal reflux disease    Depression    History of migraine headaches    Hyperlipidemia    Acquired scoliosis    Acquired spondylolisthesis    Adult-onset obesity    Anemia    Asthma    Body fluid retention    Carcinoma in situ of larynx    Carpal tunnel syndrome    Cervical paraspinal muscle spasm    Cervico-occipital neuralgia    Chronic left-sided low back pain with left-sided sciatica    Chronic obstructive pulmonary disease (Multi)    Closed fracture of left patella    Conjunctivitis    Throat clearing    Cough in adult    Dupuytren's disease of palm    Dysphagia, oropharyngeal phase    Flexor tendinitis of hand    GBS bacteriuria    De Quervain's tenosynovitis, right    Hand injury, right, initial encounter    Radial styloid tenosynovitis of right hand    Headache    Hearing loss, left    Herpes zoster    History of malignant neoplasm of pharynx    Hoarseness of voice    Inflammatory dermatosis    Disease of larynx    Laryngeal hyperkeratosis    Lumbar stenosis with neurogenic claudication    Strain of lumbar region    Lumbar strain    Dysphonia    Muscle tension dysphonia    Pain disorder associated with psychological and physical factors    Pain of right thumb    Psychogenic pain    Pulmonary atelectasis    Knee injury    Right knee injury    Shortness of breath    Anxiety    Situational anxiety    Postprocedural state    Spinal cord stimulator status    Sprain of finger,  right, initial encounter    Status post cervical spinal fusion    Subclinical hypothyroidism    Tendinitis    Tonsillitis    Weight gain    Dry mouth    Chest pain    Arthralgia of hip    Left hip pain    Left knee pain    Pain of lower extremity    Esophageal reflux    Bronchitis       =================================  EXAM  =================================  GENERAL: A/Ox3, NAD. Appears healthy, well nourished  CARDIAC: regular rate  LUNGS: Breathing non-labored    MUSCULOSKELETAL:  Laterality: right Hip exam  - Strength: Abduction 5/5, Flexion 5/5  - Palpation: non TTP along surgical site  - Incision: Well-healed  - EHL/PF/DF motor intact  - compartments soft, negative homans  - Gait: using no assistive device    Laterality: left Hip Exam  - ROM, Extension: full, no flexion contracture  - Strength: Abduction 5/5, Flexion 5/5. Abductor pain against resistance: Yes   - Palpation:  TTP along greater trochanter, posterolateral border  - Log roll/IR exam: non painful, good IR  - Straight leg raise: negative  - EHL/PF/DF motor intact  - Gait: normal  - Special Tests: negative Kwan    NEUROVASCULAR:  - Neurovascular Status: sensation intact to light touch distally  - Capillary refill brisk at extremities, Bilateral dorsalis pedis pulse 2+     IMAGING: X-rays of the right hip and pelvis reviewed which demonstrates no signs of loosening failure right total hip arthroplasty. X-rays were personally reviewed by me.  Radiology reports were reviewed by me as well, if available at the time.        Gonzalo Bishop DO  Attending Surgeon  Joint Replacement and Adult Reconstructive Surgery  [place], [place]

## 2025-02-01 LAB
ALBUMIN SERPL-MCNC: 4.3 G/DL (ref 3.6–5.1)
ALP SERPL-CCNC: 76 U/L (ref 37–153)
ALT SERPL-CCNC: 10 U/L (ref 6–29)
ANION GAP SERPL CALCULATED.4IONS-SCNC: 11 MMOL/L (CALC) (ref 7–17)
AST SERPL-CCNC: 11 U/L (ref 10–35)
BILIRUB SERPL-MCNC: 0.7 MG/DL (ref 0.2–1.2)
BUN SERPL-MCNC: 18 MG/DL (ref 7–25)
CALCIUM SERPL-MCNC: 9.3 MG/DL (ref 8.6–10.4)
CHLORIDE SERPL-SCNC: 103 MMOL/L (ref 98–110)
CHOLEST SERPL-MCNC: 214 MG/DL
CHOLEST/HDLC SERPL: 3.7 (CALC)
CO2 SERPL-SCNC: 27 MMOL/L (ref 20–32)
CREAT SERPL-MCNC: 0.84 MG/DL (ref 0.5–1.05)
EGFRCR SERPLBLD CKD-EPI 2021: 77 ML/MIN/1.73M2
EST. AVERAGE GLUCOSE BLD GHB EST-MCNC: 123 MG/DL
EST. AVERAGE GLUCOSE BLD GHB EST-SCNC: 6.8 MMOL/L
GLUCOSE SERPL-MCNC: 90 MG/DL (ref 65–99)
HBA1C MFR BLD: 5.9 % OF TOTAL HGB
HDLC SERPL-MCNC: 58 MG/DL
LDLC SERPL CALC-MCNC: 136 MG/DL (CALC)
NONHDLC SERPL-MCNC: 156 MG/DL (CALC)
POTASSIUM SERPL-SCNC: 4.6 MMOL/L (ref 3.5–5.3)
PROT SERPL-MCNC: 6.8 G/DL (ref 6.1–8.1)
SODIUM SERPL-SCNC: 141 MMOL/L (ref 135–146)
TRIGL SERPL-MCNC: 102 MG/DL

## 2025-02-03 ASSESSMENT — ENCOUNTER SYMPTOMS
HEMATURIA: 0
NERVOUS/ANXIOUS: 0
FATIGUE: 0
DIZZINESS: 0
NUMBNESS: 0
DIARRHEA: 0
SHORTNESS OF BREATH: 0
ABDOMINAL PAIN: 0
HEADACHES: 0
CONSTIPATION: 0
WHEEZING: 0
COUGH: 0
NAUSEA: 0
CHILLS: 0
DYSURIA: 0
DYSPHORIC MOOD: 0
FREQUENCY: 0
FEVER: 0
PALPITATIONS: 0

## 2025-02-03 NOTE — PROGRESS NOTES
Radha Puri  presents for   Chief Complaint   Patient presents with    Follow-up    Med Refill       HPI  Patient here for medication refills.  Needs refill of ambien. Takes it nightly. Has tried melatonin. Hasn't tried trazodone or amitriptyline.     She is also on the Lyrica for her chronic low back pain.  She takes cyclobenzaprine as needed  She is on 10 mg of Paxil as well.    OARRS:  Elizabeth Donahue, DO on 2/4/2025  7:56 AM  I have personally reviewed the OARRS report for Radha Puri. I have considered the risks of abuse, dependence, addiction and diversion    Is the patient prescribed a combination of a benzodiazepine and opioid?  No    Last Urine Drug Screen / ordered today: Yes  Recent Results (from the past 8760 hours)   Confirmation Opiate/Opioid/Benzo Prescription Compliance    Collection Time: 08/14/24  7:50 AM   Result Value Ref Range    Clonazepam <25 <25 ng/mL    7-Aminoclonazepam <25 <25 ng/mL    Alprazolam <25 <25 ng/mL    Alpha-Hydroxyalprazolam <25 <25 ng/mL    Midazolam <25 <25 ng/mL    Alpha-Hydroxymidazolam <25 <25 ng/mL    Chlordiazepoxide <25 <25 ng/mL    Diazepam <25 <25 ng/mL    Nordiazepam <25 <25 ng/mL    Temazepam <25 <25 ng/mL    Oxazepam <25 <25 ng/mL    Lorazepam <25 <25 ng/mL    Methadone <25 <25 ng/mL    EDDP <25 <25 ng/mL    6-Acetylmorphine <25 <25 ng/mL    Codeine <50 <50 ng/mL    Hydrocodone <25 <25 ng/mL    Hydromorphone <25 <25 ng/mL    Morphine  <50 <50 ng/mL    Norhydrocodone <25 <25 ng/mL    Noroxycodone <25 <25 ng/mL    Oxycodone <25 <25 ng/mL    Oxymorphone <25 <25 ng/mL    Fentanyl <2.5 <2.5 ng/mL    Norfentanyl <2.5 <2.5 ng/mL    Tramadol <50 <50 ng/mL    O-Desmethyltramadol <50 <50 ng/mL    Zolpidem <25 <25 ng/mL    Zolpidem Metabolite (ZCA) >1,000 (H) <25 ng/mL   Screen Opiate/Opioid/Benzo Prescription Compliance    Collection Time: 08/14/24  7:50 AM   Result Value Ref Range    Creatinine, Urine Random 94.3 20.0 - 320.0 mg/dL    Amphetamine Screen, Urine  Presumptive Negative Presumptive Negative    Barbiturate Screen, Urine Presumptive Negative Presumptive Negative    Cannabinoid Screen, Urine Presumptive Negative Presumptive Negative    Cocaine Metabolite Screen, Urine Presumptive Negative Presumptive Negative    PCP Screen, Urine Presumptive Negative Presumptive Negative   Zolpidem Confirmation, Urine    Collection Time: 08/14/24  7:50 AM   Result Value Ref Range    Zolpidem <25 <25 ng/mL    Zolpidem Metabolite (ZCA) >1,000 (H) <25 ng/mL     Results are as expected.     Controlled Substance Agreement:  Date of the Last Agreement: 7/19/23  Reviewed Controlled Substance Agreement including but not limited to the benefits, risks, and alternatives to treatment with a Controlled Substance medication(s).    Sleep Aids:   What is the patient's goal of therapy? Improve sleep  Is this being achieved with current treatment? yes    Activities of Daily Living:   Is your overall impression that this patient is benefiting (symptom reduction outweighs side effects) from sleep aid therapy? Yes     1. Physical Functioning: Better  2. Family Relationship: Same  3. Social Relationship: Same  4. Mood: Same  5. Sleep Patterns: Better  6. Overall Function: Better      Review of Systems   Constitutional:  Negative for chills, fatigue and fever.   Respiratory:  Negative for cough, shortness of breath and wheezing.    Cardiovascular:  Negative for chest pain, palpitations and leg swelling.   Gastrointestinal:  Negative for abdominal pain, constipation, diarrhea and nausea.   Genitourinary:  Negative for dysuria, frequency, hematuria and urgency.   Neurological:  Negative for dizziness, numbness and headaches.   Psychiatric/Behavioral:  Negative for dysphoric mood. The patient is not nervous/anxious.           Objective    /80   Pulse 68   Temp 36.6 °C (97.9 °F)   Wt 58.2 kg (128 lb 6.4 oz)   SpO2 98%   BMI 23.48 kg/m²     Physical Exam  Constitutional:       Appearance: Normal  appearance.   HENT:      Head: Normocephalic and atraumatic.   Eyes:      Extraocular Movements: Extraocular movements intact.      Pupils: Pupils are equal, round, and reactive to light.   Cardiovascular:      Rate and Rhythm: Normal rate and regular rhythm.      Heart sounds: Normal heart sounds. No murmur heard.  Pulmonary:      Effort: Pulmonary effort is normal.      Breath sounds: Normal breath sounds. No wheezing.   Musculoskeletal:         General: Normal range of motion.   Skin:     General: Skin is warm and dry.   Neurological:      General: No focal deficit present.      Mental Status: She is alert and oriented to person, place, and time.   Psychiatric:         Mood and Affect: Mood normal.         Behavior: Behavior normal.            Problem List Items Addressed This Visit       Insomnia    Relevant Medications    zolpidem (Ambien) 5 mg tablet    Primary osteoarthritis of right hip    Relevant Medications    cyclobenzaprine (Flexeril) 10 mg tablet    Depression    Relevant Medications    PARoxetine (Paxil) 10 mg tablet    Asthma    Relevant Medications    albuterol 90 mcg/actuation inhaler     Other Visit Diagnoses       Allergy, subsequent encounter        Relevant Medications    montelukast (Singulair) 10 mg tablet    Sciatica of right side        Relevant Medications    pregabalin (Lyrica) 75 mg capsule          Blood work reviewed with patient.  Numbers have improved from last August.    Stable on her medications.  I have refilled them we will see her back in 6 months for her welcome to Medicare physical as well as medication refills.    I personally have reviewed the OARRS report for this patient.  This report is scanned into the electronic medical record.  I have considered the risks of abuse, dependence, addiction and diversion.  I believe that it is clinically appropriate for the patient to be prescribed this medication.  Urine drug screen and drug contract either pending or up-to-date.    Elizabeth  BETTINA Donahue, DO  02/04/25

## 2025-02-04 ENCOUNTER — APPOINTMENT (OUTPATIENT)
Dept: PRIMARY CARE | Facility: CLINIC | Age: 65
End: 2025-02-04
Payer: COMMERCIAL

## 2025-02-04 VITALS
TEMPERATURE: 97.9 F | WEIGHT: 128.4 LBS | SYSTOLIC BLOOD PRESSURE: 120 MMHG | BODY MASS INDEX: 23.48 KG/M2 | HEART RATE: 68 BPM | DIASTOLIC BLOOD PRESSURE: 80 MMHG | OXYGEN SATURATION: 98 %

## 2025-02-04 DIAGNOSIS — M54.31 SCIATICA OF RIGHT SIDE: ICD-10-CM

## 2025-02-04 DIAGNOSIS — J45.40 MODERATE PERSISTENT ASTHMA WITHOUT COMPLICATION (HHS-HCC): ICD-10-CM

## 2025-02-04 DIAGNOSIS — M16.11 PRIMARY OSTEOARTHRITIS OF RIGHT HIP: ICD-10-CM

## 2025-02-04 DIAGNOSIS — T78.40XD ALLERGY, SUBSEQUENT ENCOUNTER: ICD-10-CM

## 2025-02-04 DIAGNOSIS — F34.1 PERSISTENT DEPRESSIVE DISORDER: ICD-10-CM

## 2025-02-04 DIAGNOSIS — G47.00 INSOMNIA, UNSPECIFIED TYPE: ICD-10-CM

## 2025-02-04 PROCEDURE — 1036F TOBACCO NON-USER: CPT | Performed by: STUDENT IN AN ORGANIZED HEALTH CARE EDUCATION/TRAINING PROGRAM

## 2025-02-04 PROCEDURE — 1123F ACP DISCUSS/DSCN MKR DOCD: CPT | Performed by: STUDENT IN AN ORGANIZED HEALTH CARE EDUCATION/TRAINING PROGRAM

## 2025-02-04 PROCEDURE — 1160F RVW MEDS BY RX/DR IN RCRD: CPT | Performed by: STUDENT IN AN ORGANIZED HEALTH CARE EDUCATION/TRAINING PROGRAM

## 2025-02-04 PROCEDURE — 1157F ADVNC CARE PLAN IN RCRD: CPT | Performed by: STUDENT IN AN ORGANIZED HEALTH CARE EDUCATION/TRAINING PROGRAM

## 2025-02-04 PROCEDURE — 99214 OFFICE O/P EST MOD 30 MIN: CPT | Performed by: STUDENT IN AN ORGANIZED HEALTH CARE EDUCATION/TRAINING PROGRAM

## 2025-02-04 PROCEDURE — 1159F MED LIST DOCD IN RCRD: CPT | Performed by: STUDENT IN AN ORGANIZED HEALTH CARE EDUCATION/TRAINING PROGRAM

## 2025-02-04 RX ORDER — PREGABALIN 75 MG/1
75 CAPSULE ORAL 2 TIMES DAILY
Qty: 180 CAPSULE | Refills: 1 | Status: SHIPPED | OUTPATIENT
Start: 2025-02-04 | End: 2025-08-03

## 2025-02-04 RX ORDER — ZOLPIDEM TARTRATE 5 MG/1
5 TABLET ORAL NIGHTLY PRN
Qty: 30 TABLET | Refills: 5 | Status: SHIPPED | OUTPATIENT
Start: 2025-02-04

## 2025-02-04 RX ORDER — PAROXETINE 10 MG/1
10 TABLET, FILM COATED ORAL EVERY MORNING
Qty: 90 TABLET | Refills: 1 | Status: SHIPPED | OUTPATIENT
Start: 2025-02-04

## 2025-02-04 RX ORDER — ALBUTEROL SULFATE 90 UG/1
2 INHALANT RESPIRATORY (INHALATION) EVERY 4 HOURS PRN
Qty: 18 G | Refills: 3 | Status: SHIPPED | OUTPATIENT
Start: 2025-02-04

## 2025-02-04 RX ORDER — MONTELUKAST SODIUM 10 MG/1
10 TABLET ORAL NIGHTLY
Qty: 90 TABLET | Refills: 3 | Status: SHIPPED | OUTPATIENT
Start: 2025-02-04

## 2025-02-04 RX ORDER — CYCLOBENZAPRINE HCL 10 MG
10 TABLET ORAL 3 TIMES DAILY PRN
Qty: 90 TABLET | Refills: 1 | Status: SHIPPED | OUTPATIENT
Start: 2025-02-04

## 2025-02-04 ASSESSMENT — PATIENT HEALTH QUESTIONNAIRE - PHQ9
2. FEELING DOWN, DEPRESSED OR HOPELESS: NOT AT ALL
1. LITTLE INTEREST OR PLEASURE IN DOING THINGS: NOT AT ALL
SUM OF ALL RESPONSES TO PHQ9 QUESTIONS 1 AND 2: 0

## 2025-02-09 ENCOUNTER — OFFICE VISIT (OUTPATIENT)
Dept: URGENT CARE | Age: 65
End: 2025-02-09
Payer: COMMERCIAL

## 2025-02-09 VITALS
SYSTOLIC BLOOD PRESSURE: 104 MMHG | HEART RATE: 97 BPM | TEMPERATURE: 98.6 F | DIASTOLIC BLOOD PRESSURE: 47 MMHG | OXYGEN SATURATION: 90 %

## 2025-02-09 DIAGNOSIS — R05.1 ACUTE COUGH: ICD-10-CM

## 2025-02-09 DIAGNOSIS — R06.2 WHEEZING: ICD-10-CM

## 2025-02-09 DIAGNOSIS — J45.901 EXACERBATION OF ASTHMA, UNSPECIFIED ASTHMA SEVERITY, UNSPECIFIED WHETHER PERSISTENT (HHS-HCC): Primary | ICD-10-CM

## 2025-02-09 RX ORDER — METHYLPREDNISOLONE 4 MG/1
TABLET ORAL
Qty: 21 TABLET | Refills: 0 | Status: SHIPPED | OUTPATIENT
Start: 2025-02-09 | End: 2025-02-15

## 2025-02-09 RX ORDER — AZITHROMYCIN 250 MG/1
TABLET, FILM COATED ORAL
Qty: 6 TABLET | Refills: 0 | Status: SHIPPED | OUTPATIENT
Start: 2025-02-09

## 2025-02-09 RX ORDER — IPRATROPIUM BROMIDE AND ALBUTEROL SULFATE 2.5; .5 MG/3ML; MG/3ML
3 SOLUTION RESPIRATORY (INHALATION) ONCE
Status: SHIPPED | OUTPATIENT
Start: 2025-02-09

## 2025-02-09 ASSESSMENT — ENCOUNTER SYMPTOMS
GASTROINTESTINAL NEGATIVE: 1
EYES NEGATIVE: 1
MUSCULOSKELETAL NEGATIVE: 1
SINUS PAIN: 1
WHEEZING: 1
NEUROLOGICAL NEGATIVE: 1
RHINORRHEA: 1
PSYCHIATRIC NEGATIVE: 1
COUGH: 1
HEMATOLOGIC/LYMPHATIC NEGATIVE: 1
ALLERGIC/IMMUNOLOGIC NEGATIVE: 1
ENDOCRINE NEGATIVE: 1
CARDIOVASCULAR NEGATIVE: 1
FATIGUE: 1

## 2025-02-09 NOTE — PROGRESS NOTES
"Subjective   Patient ID: Radha Puri \"Hilda\" is a 64 y.o. female. They present today with a chief complaint of Illness (Sob. Coughing 1 day. Pt has asthma/).    History of Present Illness    Cough  This is a recurrent problem. The current episode started in the past 7 days. The cough is Productive of brown sputum. Associated symptoms include rhinorrhea and wheezing.       Past Medical History  Allergies as of 02/09/2025 - Reviewed 02/09/2025   Allergen Reaction Noted    Keflex [cephalexin] Anxiety and Insomnia 02/20/2024    Doxycycline Diarrhea 05/02/2023       (Not in a hospital admission)       Past Medical History:   Diagnosis Date    Acute bronchitis due to other specified organisms 08/06/2019    Acute bacterial bronchitis    Atelectasis 06/24/2013    Atelectasis    Carpal tunnel syndrome, unspecified upper limb 06/24/2013    Carpal tunnel syndrome    Chest pain, unspecified 04/02/2013    Chest pain    Chronic obstructive pulmonary disease, unspecified     Chronic obstructive pulmonary disease    Cutaneous abscess of face 04/02/2013    Abscess of face    Diverticulosis     Enthesopathy, unspecified 04/02/2013    Tendonitis    Erysipelas 04/02/2013    Erysipelas    Gastro-esophageal reflux disease with esophagitis, without bleeding 08/14/2014    Gastro-esophageal reflux disease with esophagitis    GERD (gastroesophageal reflux disease)     Hard to intubate     Headache, unspecified 04/02/2013    Headache    Larynx cancer (Multi)     surgery to remove only    Migraine, unspecified, not intractable, without status migrainosus 11/28/2018    Headache, migraine    Occipital neuralgia     Other conditions influencing health status 06/24/2013    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    Pain in leg, unspecified 06/24/2013    Lower extremity pain    Personal history of malignant neoplasm of unspecified site of lip, oral cavity, and pharynx 08/14/2014    History of malignant neoplasm of pharynx    Personal " history of other diseases of the digestive system 04/26/2019    History of glossitis    Personal history of other specified conditions 06/09/2014    History of heartburn    Personal history of other specified conditions 06/12/2018    History of dyspnea    Personal history of pneumonia (recurrent) 01/23/2014    History of pneumonia    Sprain of joints and ligaments of unspecified parts of neck, initial encounter 04/02/2013    Neck sprain    Zoster without complications 04/02/2013    Herpes zoster       Past Surgical History:   Procedure Laterality Date    BREAST BIOPSY Right     benign    BUNIONECTOMY Left     CERVICAL DISCECTOMY  11/07/2013    Spinal Diskectomy Cervical    CERVICAL FUSION  09/12/2013    Cervical Vertebral Fusion    EYE SURGERY Bilateral 11/07/2013    Eye Surgery    KNEE ARTHROSCOPY W/ DEBRIDEMENT Right 11/07/2013    Arthroscopy Knee Right    OTHER SURGICAL HISTORY  08/16/2014    Throat Surgery to reove larynx cancer (several times)    OTHER SURGICAL HISTORY      occipital nerve stimulator to left upper chest    OTHER SURGICAL HISTORY      several cervical injections for pain    ROTATOR CUFF REPAIR Right 09/05/2013    Rotator Cuff Repair x2    SINUS SURGERY  11/07/2013    Sinus Surgery septoplasty x 2, nasal fracture x 2        reports that she quit smoking about 31 years ago. Her smoking use included cigarettes. She started smoking about 51 years ago. She has a 20 pack-year smoking history. She has been exposed to tobacco smoke. She has never used smokeless tobacco. She reports current alcohol use of about 2.0 standard drinks of alcohol per week. She reports current drug use. Drug: Marijuana.    Review of Systems  Review of Systems   Constitutional:  Positive for fatigue.   HENT:  Positive for rhinorrhea and sinus pain.    Eyes: Negative.    Respiratory:  Positive for cough and wheezing.    Cardiovascular: Negative.    Gastrointestinal: Negative.    Endocrine: Negative.    Genitourinary: Negative.     Musculoskeletal: Negative.    Skin: Negative.    Allergic/Immunologic: Negative.    Neurological: Negative.    Hematological: Negative.    Psychiatric/Behavioral: Negative.     All other systems reviewed and are negative.                                 Objective    Vitals:    02/09/25 1156   BP: (!) 104/47   Pulse: 97   Temp: 37 °C (98.6 °F)   SpO2: 90%     No LMP recorded. Patient is postmenopausal.    Physical Exam  Vitals and nursing note reviewed.   Constitutional:       Appearance: She is normal weight.   HENT:      Head: Normocephalic.      Right Ear: Tympanic membrane is erythematous.      Left Ear: Tympanic membrane is erythematous.      Nose: Congestion and rhinorrhea present.      Right Sinus: Maxillary sinus tenderness present.      Left Sinus: Maxillary sinus tenderness present.   Cardiovascular:      Rate and Rhythm: Normal rate and regular rhythm.   Pulmonary:      Effort: Pulmonary effort is normal.      Breath sounds: Examination of the right-lower field reveals wheezing. Examination of the left-lower field reveals wheezing. Wheezing present.   Abdominal:      General: Bowel sounds are normal.      Palpations: Abdomen is soft.   Musculoskeletal:         General: Normal range of motion.   Skin:     General: Skin is warm and dry.   Neurological:      General: No focal deficit present.      Mental Status: She is alert and oriented to person, place, and time. Mental status is at baseline.   Psychiatric:         Mood and Affect: Mood normal.         Behavior: Behavior normal.         Thought Content: Thought content normal.         Judgment: Judgment normal.         Procedures    Point of Care Test & Imaging Results from this visit  No results found for this visit on 02/09/25.   No results found.    Diagnostic study results (if any) were reviewed by BRYCE Silver.    Assessment/Plan   Allergies, medications, history, and pertinent labs/EKGs/Imaging reviewed by BRYCE Silver.      Medical Decision Making  Medical Decision Making  At time of discharge patient was clinically well-appearing and HDS for outpatient management. The patient and/or family was educated regarding diagnosis, supportive care, OTC and Rx medications. The patient and/or family was given the opportunity to ask questions prior to discharge.  They verbalized understanding of my discussion of the plans for treatment, expected course, indications to return to  or seek further evaluation in ED, and the need for timely follow up as directed.   They were provided with a work/school excuse if requested.        Orders and Diagnoses  Diagnoses and all orders for this visit:  Exacerbation of asthma, unspecified asthma severity, unspecified whether persistent ([facility])  -     azithromycin (Zithromax) 250 mg tablet; Take 2 tabs (500 mg) by mouth today, then take 1 tab daily for 4 days.  -     methylPREDNISolone (Medrol Dospak) 4 mg tablets; Follow schedule on package instructions  -     ipratropium-albuteroL (Duo-Neb) 0.5-2.5 mg/3 mL nebulizer solution 3 mL  -     azithromycin (Zithromax) 250 mg tablet; Take 2 tabs (500 mg) by mouth today, then take 1 tab daily for 4 days.  Wheezing  -     azithromycin (Zithromax) 250 mg tablet; Take 2 tabs (500 mg) by mouth today, then take 1 tab daily for 4 days.  -     methylPREDNISolone (Medrol Dospak) 4 mg tablets; Follow schedule on package instructions  -     ipratropium-albuteroL (Duo-Neb) 0.5-2.5 mg/3 mL nebulizer solution 3 mL  Acute cough  -     azithromycin (Zithromax) 250 mg tablet; Take 2 tabs (500 mg) by mouth today, then take 1 tab daily for 4 days.  -     methylPREDNISolone (Medrol Dospak) 4 mg tablets; Follow schedule on package instructions  -     ipratropium-albuteroL (Duo-Neb) 0.5-2.5 mg/3 mL nebulizer solution 3 mL  -     azithromycin (Zithromax) 250 mg tablet; Take 2 tabs (500 mg) by mouth today, then take 1 tab daily for 4 days.    Return to Urgent care if symptoms return  or progress  Follow up with PCP in 1-2 weeks       Patient disposition: Home    Electronically signed by JENELLE Silver-DANYA  12:56 PM

## 2025-02-11 ENCOUNTER — APPOINTMENT (OUTPATIENT)
Dept: CARDIOLOGY | Facility: HOSPITAL | Age: 65
End: 2025-02-11
Payer: COMMERCIAL

## 2025-02-11 ENCOUNTER — HOSPITAL ENCOUNTER (OUTPATIENT)
Facility: HOSPITAL | Age: 65
Setting detail: OBSERVATION
Discharge: HOME | End: 2025-02-12
Attending: EMERGENCY MEDICINE | Admitting: INTERNAL MEDICINE
Payer: COMMERCIAL

## 2025-02-11 ENCOUNTER — APPOINTMENT (OUTPATIENT)
Dept: RADIOLOGY | Facility: HOSPITAL | Age: 65
End: 2025-02-11
Payer: COMMERCIAL

## 2025-02-11 ENCOUNTER — OFFICE VISIT (OUTPATIENT)
Dept: URGENT CARE | Age: 65
End: 2025-02-11

## 2025-02-11 VITALS
TEMPERATURE: 98.1 F | HEART RATE: 86 BPM | OXYGEN SATURATION: 98 % | SYSTOLIC BLOOD PRESSURE: 146 MMHG | RESPIRATION RATE: 20 BRPM | DIASTOLIC BLOOD PRESSURE: 64 MMHG

## 2025-02-11 DIAGNOSIS — J45.901 EXACERBATION OF ASTHMA, UNSPECIFIED ASTHMA SEVERITY, UNSPECIFIED WHETHER PERSISTENT (HHS-HCC): Primary | ICD-10-CM

## 2025-02-11 DIAGNOSIS — J11.1 INFLUENZA: Primary | ICD-10-CM

## 2025-02-11 DIAGNOSIS — R06.02 SHORTNESS OF BREATH: ICD-10-CM

## 2025-02-11 DIAGNOSIS — R07.1 CHEST PAIN ON BREATHING: ICD-10-CM

## 2025-02-11 DIAGNOSIS — J10.1 INFLUENZA A: ICD-10-CM

## 2025-02-11 DIAGNOSIS — J45.41 MODERATE PERSISTENT ASTHMA WITH EXACERBATION (HHS-HCC): ICD-10-CM

## 2025-02-11 LAB
ALBUMIN SERPL BCP-MCNC: 3.9 G/DL (ref 3.4–5)
ALP SERPL-CCNC: 79 U/L (ref 33–136)
ALT SERPL W P-5'-P-CCNC: 14 U/L (ref 7–45)
ANION GAP SERPL CALC-SCNC: 9 MMOL/L (ref 10–20)
AST SERPL W P-5'-P-CCNC: 13 U/L (ref 9–39)
BASOPHILS # BLD AUTO: 0.01 X10*3/UL (ref 0–0.1)
BASOPHILS NFR BLD AUTO: 0.2 %
BILIRUB SERPL-MCNC: 0.3 MG/DL (ref 0–1.2)
BUN SERPL-MCNC: 12 MG/DL (ref 6–23)
CALCIUM SERPL-MCNC: 8.3 MG/DL (ref 8.6–10.3)
CHLORIDE SERPL-SCNC: 104 MMOL/L (ref 98–107)
CO2 SERPL-SCNC: 30 MMOL/L (ref 21–32)
CREAT SERPL-MCNC: 0.77 MG/DL (ref 0.5–1.05)
EGFRCR SERPLBLD CKD-EPI 2021: 86 ML/MIN/1.73M*2
EOSINOPHIL # BLD AUTO: 0 X10*3/UL (ref 0–0.7)
EOSINOPHIL NFR BLD AUTO: 0 %
ERYTHROCYTE [DISTWIDTH] IN BLOOD BY AUTOMATED COUNT: 12.6 % (ref 11.5–14.5)
FLUAV RNA RESP QL NAA+PROBE: DETECTED
FLUBV RNA RESP QL NAA+PROBE: NOT DETECTED
GLUCOSE SERPL-MCNC: 99 MG/DL (ref 74–99)
HCT VFR BLD AUTO: 38.8 % (ref 36–46)
HGB BLD-MCNC: 12.7 G/DL (ref 12–16)
IMM GRANULOCYTES # BLD AUTO: 0.02 X10*3/UL (ref 0–0.7)
IMM GRANULOCYTES NFR BLD AUTO: 0.3 % (ref 0–0.9)
LYMPHOCYTES # BLD AUTO: 1.23 X10*3/UL (ref 1.2–4.8)
LYMPHOCYTES NFR BLD AUTO: 20.7 %
MCH RBC QN AUTO: 30.9 PG (ref 26–34)
MCHC RBC AUTO-ENTMCNC: 32.7 G/DL (ref 32–36)
MCV RBC AUTO: 94 FL (ref 80–100)
MONOCYTES # BLD AUTO: 0.43 X10*3/UL (ref 0.1–1)
MONOCYTES NFR BLD AUTO: 7.2 %
NEUTROPHILS # BLD AUTO: 4.25 X10*3/UL (ref 1.2–7.7)
NEUTROPHILS NFR BLD AUTO: 71.6 %
NRBC BLD-RTO: 0 /100 WBCS (ref 0–0)
PLATELET # BLD AUTO: 304 X10*3/UL (ref 150–450)
POTASSIUM SERPL-SCNC: 4.2 MMOL/L (ref 3.5–5.3)
PROT SERPL-MCNC: 6.4 G/DL (ref 6.4–8.2)
RBC # BLD AUTO: 4.11 X10*6/UL (ref 4–5.2)
RSV RNA RESP QL NAA+PROBE: NOT DETECTED
SARS-COV-2 RNA RESP QL NAA+PROBE: NOT DETECTED
SODIUM SERPL-SCNC: 139 MMOL/L (ref 136–145)
WBC # BLD AUTO: 5.9 X10*3/UL (ref 4.4–11.3)

## 2025-02-11 PROCEDURE — 2500000004 HC RX 250 GENERAL PHARMACY W/ HCPCS (ALT 636 FOR OP/ED): Mod: JZ | Performed by: EMERGENCY MEDICINE

## 2025-02-11 PROCEDURE — 99285 EMERGENCY DEPT VISIT HI MDM: CPT | Mod: 25 | Performed by: EMERGENCY MEDICINE

## 2025-02-11 PROCEDURE — 71045 X-RAY EXAM CHEST 1 VIEW: CPT | Performed by: RADIOLOGY

## 2025-02-11 PROCEDURE — 85025 COMPLETE CBC W/AUTO DIFF WBC: CPT | Performed by: EMERGENCY MEDICINE

## 2025-02-11 PROCEDURE — 96374 THER/PROPH/DIAG INJ IV PUSH: CPT

## 2025-02-11 PROCEDURE — 87637 SARSCOV2&INF A&B&RSV AMP PRB: CPT | Performed by: EMERGENCY MEDICINE

## 2025-02-11 PROCEDURE — 71045 X-RAY EXAM CHEST 1 VIEW: CPT

## 2025-02-11 PROCEDURE — 36415 COLL VENOUS BLD VENIPUNCTURE: CPT | Performed by: EMERGENCY MEDICINE

## 2025-02-11 PROCEDURE — 2500000002 HC RX 250 W HCPCS SELF ADMINISTERED DRUGS (ALT 637 FOR MEDICARE OP, ALT 636 FOR OP/ED): Performed by: EMERGENCY MEDICINE

## 2025-02-11 PROCEDURE — 80053 COMPREHEN METABOLIC PANEL: CPT | Performed by: EMERGENCY MEDICINE

## 2025-02-11 PROCEDURE — 94640 AIRWAY INHALATION TREATMENT: CPT

## 2025-02-11 PROCEDURE — 93005 ELECTROCARDIOGRAM TRACING: CPT

## 2025-02-11 PROCEDURE — 86140 C-REACTIVE PROTEIN: CPT | Performed by: NURSE PRACTITIONER

## 2025-02-11 RX ORDER — IPRATROPIUM BROMIDE AND ALBUTEROL SULFATE 2.5; .5 MG/3ML; MG/3ML
3 SOLUTION RESPIRATORY (INHALATION) ONCE
Status: DISCONTINUED | OUTPATIENT
Start: 2025-02-11 | End: 2025-02-11 | Stop reason: HOSPADM

## 2025-02-11 RX ORDER — LEVOFLOXACIN 5 MG/ML
750 INJECTION, SOLUTION INTRAVENOUS ONCE
Status: DISCONTINUED | OUTPATIENT
Start: 2025-02-11 | End: 2025-02-11

## 2025-02-11 RX ORDER — IPRATROPIUM BROMIDE AND ALBUTEROL SULFATE 2.5; .5 MG/3ML; MG/3ML
3 SOLUTION RESPIRATORY (INHALATION)
Status: COMPLETED | OUTPATIENT
Start: 2025-02-11 | End: 2025-02-11

## 2025-02-11 RX ADMIN — METHYLPREDNISOLONE SODIUM SUCCINATE 125 MG: 125 INJECTION, POWDER, FOR SOLUTION INTRAMUSCULAR; INTRAVENOUS at 22:38

## 2025-02-11 RX ADMIN — IPRATROPIUM BROMIDE AND ALBUTEROL SULFATE 3 ML: 2.5; .5 SOLUTION RESPIRATORY (INHALATION) at 16:30

## 2025-02-11 RX ADMIN — IPRATROPIUM BROMIDE AND ALBUTEROL SULFATE 3 ML: 2.5; .5 SOLUTION RESPIRATORY (INHALATION) at 22:48

## 2025-02-11 RX ADMIN — IPRATROPIUM BROMIDE AND ALBUTEROL SULFATE 3 ML: 2.5; .5 SOLUTION RESPIRATORY (INHALATION) at 22:50

## 2025-02-11 RX ADMIN — IPRATROPIUM BROMIDE AND ALBUTEROL SULFATE 3 ML: 2.5; .5 SOLUTION RESPIRATORY (INHALATION) at 22:49

## 2025-02-11 ASSESSMENT — ENCOUNTER SYMPTOMS
MUSCULOSKELETAL NEGATIVE: 1
FATIGUE: 1
COUGH: 1
NEUROLOGICAL NEGATIVE: 1
PSYCHIATRIC NEGATIVE: 1
WHEEZING: 1
SHORTNESS OF BREATH: 1
CHEST TIGHTNESS: 1

## 2025-02-11 ASSESSMENT — PAIN - FUNCTIONAL ASSESSMENT: PAIN_FUNCTIONAL_ASSESSMENT: 0-10

## 2025-02-11 ASSESSMENT — PAIN SCALES - GENERAL: PAINLEVEL_OUTOF10: 0 - NO PAIN

## 2025-02-11 ASSESSMENT — COLUMBIA-SUICIDE SEVERITY RATING SCALE - C-SSRS
2. HAVE YOU ACTUALLY HAD ANY THOUGHTS OF KILLING YOURSELF?: NO
6. HAVE YOU EVER DONE ANYTHING, STARTED TO DO ANYTHING, OR PREPARED TO DO ANYTHING TO END YOUR LIFE?: NO
1. IN THE PAST MONTH, HAVE YOU WISHED YOU WERE DEAD OR WISHED YOU COULD GO TO SLEEP AND NOT WAKE UP?: NO

## 2025-02-11 NOTE — PATIENT INSTRUCTIONS
Sending to ER, declining squad, states  is driving.    Breathing worsening/Chest heaviness  EKG normal  Lungs unchanged after Duoneb nebulizer treatment  No Xray available in clinic  Discussed with Dr Garrison who recommended sending to ER for additional eval and treatment

## 2025-02-11 NOTE — PROGRESS NOTES
"Subjective   Patient ID: Radha Puri \"Hilda\" is a 64 y.o. female. They present today with a chief complaint of Cough (Pt advised that she has a cough with chest congestion. Pt was seen 2 days prior for same symptoms, but her symptoms are worse today. ).  Has been using albuterol inhaler and nebulizer at home, not helping.  Chest feels heavy, feels SOB with any exertion.         Past Medical History  Allergies as of 02/11/2025 - Reviewed 02/11/2025   Allergen Reaction Noted    Keflex [cephalexin] Anxiety and Insomnia 02/20/2024    Doxycycline Diarrhea 05/02/2023       (Not in a hospital admission)       Past Medical History:   Diagnosis Date    Acute bronchitis due to other specified organisms 08/06/2019    Acute bacterial bronchitis    Atelectasis 06/24/2013    Atelectasis    Carpal tunnel syndrome, unspecified upper limb 06/24/2013    Carpal tunnel syndrome    Chest pain, unspecified 04/02/2013    Chest pain    Chronic obstructive pulmonary disease, unspecified     Chronic obstructive pulmonary disease    Cutaneous abscess of face 04/02/2013    Abscess of face    Diverticulosis     Enthesopathy, unspecified 04/02/2013    Tendonitis    Erysipelas 04/02/2013    Erysipelas    Gastro-esophageal reflux disease with esophagitis, without bleeding 08/14/2014    Gastro-esophageal reflux disease with esophagitis    GERD (gastroesophageal reflux disease)     Hard to intubate     Headache, unspecified 04/02/2013    Headache    Larynx cancer (Multi)     surgery to remove only    Migraine, unspecified, not intractable, without status migrainosus 11/28/2018    Headache, migraine    Occipital neuralgia     Other conditions influencing health status 06/24/2013    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    Pain in leg, unspecified 06/24/2013    Lower extremity pain    Personal history of malignant neoplasm of unspecified site of lip, oral cavity, and pharynx 08/14/2014    History of malignant neoplasm of pharynx    " Personal history of other diseases of the digestive system 04/26/2019    History of glossitis    Personal history of other specified conditions 06/09/2014    History of heartburn    Personal history of other specified conditions 06/12/2018    History of dyspnea    Personal history of pneumonia (recurrent) 01/23/2014    History of pneumonia    Sprain of joints and ligaments of unspecified parts of neck, initial encounter 04/02/2013    Neck sprain    Zoster without complications 04/02/2013    Herpes zoster       Past Surgical History:   Procedure Laterality Date    BREAST BIOPSY Right     benign    BUNIONECTOMY Left     CERVICAL DISCECTOMY  11/07/2013    Spinal Diskectomy Cervical    CERVICAL FUSION  09/12/2013    Cervical Vertebral Fusion    EYE SURGERY Bilateral 11/07/2013    Eye Surgery    KNEE ARTHROSCOPY W/ DEBRIDEMENT Right 11/07/2013    Arthroscopy Knee Right    OTHER SURGICAL HISTORY  08/16/2014    Throat Surgery to reove larynx cancer (several times)    OTHER SURGICAL HISTORY      occipital nerve stimulator to left upper chest    OTHER SURGICAL HISTORY      several cervical injections for pain    ROTATOR CUFF REPAIR Right 09/05/2013    Rotator Cuff Repair x2    SINUS SURGERY  11/07/2013    Sinus Surgery septoplasty x 2, nasal fracture x 2        reports that she quit smoking about 31 years ago. Her smoking use included cigarettes. She started smoking about 51 years ago. She has a 20 pack-year smoking history. She has been exposed to tobacco smoke. She has never used smokeless tobacco. She reports current alcohol use of about 2.0 standard drinks of alcohol per week. She reports current drug use. Drug: Marijuana.    Review of Systems  Review of Systems   Constitutional:  Positive for fatigue.   HENT:  Positive for congestion.    Respiratory:  Positive for cough, chest tightness, shortness of breath and wheezing.    Cardiovascular:  Positive for chest pain.   Musculoskeletal: Negative.    Neurological: Negative.     Psychiatric/Behavioral: Negative.                                    Objective    Vitals:    02/11/25 1532   BP: 146/64   Pulse: 86   Resp: 20   Temp: 36.7 °C (98.1 °F)   SpO2: 98%     No LMP recorded. Patient is postmenopausal.    Physical Exam  Constitutional:       Appearance: She is ill-appearing.   HENT:      Right Ear: Tympanic membrane, ear canal and external ear normal.      Left Ear: Tympanic membrane, ear canal and external ear normal.   Pulmonary:      Breath sounds: Wheezing and rhonchi present.   Musculoskeletal:         General: Normal range of motion.   Skin:     General: Skin is warm and dry.   Neurological:      General: No focal deficit present.      Mental Status: She is alert.   Psychiatric:         Mood and Affect: Mood normal.         Behavior: Behavior normal.         Thought Content: Thought content normal.         Judgment: Judgment normal.         Procedures    Point of Care Test & Imaging Results from this visit  No results found for this visit on 02/11/25.   No results found.    Diagnostic study results (if any) were reviewed by BRYCE Nuñez.    Assessment/Plan   Allergies, medications, history, and pertinent labs/EKGs/Imaging reviewed by BRYCE Nuñez.     Medical Decision Making  Sending to ER, declining squad, states  is driving.    Breathing worsening/Chest heaviness  EKG normal  Lungs unchanged after Duoneb nebulizer treatment  No Xray available in clinic  Discussed with Dr Garrison who recommended sending to ER for additional eval and treatment    Orders and Diagnoses  Diagnoses and all orders for this visit:  Exacerbation of asthma, unspecified asthma severity, unspecified whether persistent ([facility])  Chest pain on breathing  Shortness of breath      Medical Admin Record      Patient disposition: ED    Electronically signed by BRYCE Nuñez  4:52 PM

## 2025-02-11 NOTE — ED TRIAGE NOTES
Pt presents with the c/o cough and possible pneumonia, pt states that she has been to urgent care 2x this past week, she was started on ATB and steroids. Pt went back to urgent care today for the 2nd visit and they told her that her lungs sound terrible, so they gave her a breathing Tx and told her to come to the ED for possible IV ATB .

## 2025-02-12 ENCOUNTER — PHARMACY VISIT (OUTPATIENT)
Dept: PHARMACY | Facility: CLINIC | Age: 65
End: 2025-02-12
Payer: COMMERCIAL

## 2025-02-12 VITALS
OXYGEN SATURATION: 92 % | BODY MASS INDEX: 23 KG/M2 | HEART RATE: 93 BPM | TEMPERATURE: 96.8 F | HEIGHT: 62 IN | WEIGHT: 125 LBS | RESPIRATION RATE: 17 BRPM | DIASTOLIC BLOOD PRESSURE: 69 MMHG | SYSTOLIC BLOOD PRESSURE: 136 MMHG

## 2025-02-12 PROBLEM — J10.1 INFLUENZA A: Status: ACTIVE | Noted: 2025-02-12

## 2025-02-12 LAB
ANION GAP SERPL CALC-SCNC: 14 MMOL/L (ref 10–20)
ATRIAL RATE: 74 BPM
BUN SERPL-MCNC: 17 MG/DL (ref 6–23)
CALCIUM SERPL-MCNC: 8.8 MG/DL (ref 8.6–10.3)
CHLORIDE SERPL-SCNC: 99 MMOL/L (ref 98–107)
CO2 SERPL-SCNC: 29 MMOL/L (ref 21–32)
CREAT SERPL-MCNC: 0.88 MG/DL (ref 0.5–1.05)
CRP SERPL-MCNC: 0.51 MG/DL
EGFRCR SERPLBLD CKD-EPI 2021: 73 ML/MIN/1.73M*2
ERYTHROCYTE [DISTWIDTH] IN BLOOD BY AUTOMATED COUNT: 12.5 % (ref 11.5–14.5)
GLUCOSE SERPL-MCNC: 185 MG/DL (ref 74–99)
HCT VFR BLD AUTO: 36.6 % (ref 36–46)
HGB BLD-MCNC: 12 G/DL (ref 12–16)
MCH RBC QN AUTO: 30.7 PG (ref 26–34)
MCHC RBC AUTO-ENTMCNC: 32.8 G/DL (ref 32–36)
MCV RBC AUTO: 94 FL (ref 80–100)
NRBC BLD-RTO: 0 /100 WBCS (ref 0–0)
P AXIS: 69 DEGREES
P OFFSET: 195 MS
P ONSET: 148 MS
PLATELET # BLD AUTO: 280 X10*3/UL (ref 150–450)
POTASSIUM SERPL-SCNC: 4.9 MMOL/L (ref 3.5–5.3)
PR INTERVAL: 106 MS
Q ONSET: 225 MS
QRS COUNT: 13 BEATS
QRS DURATION: 78 MS
QT INTERVAL: 384 MS
QTC CALCULATION(BAZETT): 426 MS
QTC FREDERICIA: 412 MS
R AXIS: 49 DEGREES
RBC # BLD AUTO: 3.91 X10*6/UL (ref 4–5.2)
SODIUM SERPL-SCNC: 137 MMOL/L (ref 136–145)
T AXIS: 54 DEGREES
T OFFSET: 417 MS
VENTRICULAR RATE: 74 BPM
WBC # BLD AUTO: 5.9 X10*3/UL (ref 4.4–11.3)

## 2025-02-12 PROCEDURE — RXMED WILLOW AMBULATORY MEDICATION CHARGE

## 2025-02-12 PROCEDURE — 36415 COLL VENOUS BLD VENIPUNCTURE: CPT | Performed by: NURSE PRACTITIONER

## 2025-02-12 PROCEDURE — 96376 TX/PRO/DX INJ SAME DRUG ADON: CPT

## 2025-02-12 PROCEDURE — 94640 AIRWAY INHALATION TREATMENT: CPT

## 2025-02-12 PROCEDURE — 99222 1ST HOSP IP/OBS MODERATE 55: CPT | Performed by: NURSE PRACTITIONER

## 2025-02-12 PROCEDURE — 99238 HOSP IP/OBS DSCHRG MGMT 30/<: CPT

## 2025-02-12 PROCEDURE — 2500000002 HC RX 250 W HCPCS SELF ADMINISTERED DRUGS (ALT 637 FOR MEDICARE OP, ALT 636 FOR OP/ED)

## 2025-02-12 PROCEDURE — 2500000004 HC RX 250 GENERAL PHARMACY W/ HCPCS (ALT 636 FOR OP/ED): Mod: JZ | Performed by: NURSE PRACTITIONER

## 2025-02-12 PROCEDURE — 80048 BASIC METABOLIC PNL TOTAL CA: CPT | Performed by: NURSE PRACTITIONER

## 2025-02-12 PROCEDURE — 2500000002 HC RX 250 W HCPCS SELF ADMINISTERED DRUGS (ALT 637 FOR MEDICARE OP, ALT 636 FOR OP/ED): Performed by: NURSE PRACTITIONER

## 2025-02-12 PROCEDURE — 85027 COMPLETE CBC AUTOMATED: CPT | Performed by: NURSE PRACTITIONER

## 2025-02-12 PROCEDURE — 2500000001 HC RX 250 WO HCPCS SELF ADMINISTERED DRUGS (ALT 637 FOR MEDICARE OP): Performed by: NURSE PRACTITIONER

## 2025-02-12 PROCEDURE — G0378 HOSPITAL OBSERVATION PER HR: HCPCS

## 2025-02-12 PROCEDURE — 96374 THER/PROPH/DIAG INJ IV PUSH: CPT

## 2025-02-12 RX ORDER — ACETAMINOPHEN 325 MG/1
650 TABLET ORAL EVERY 4 HOURS PRN
Status: DISCONTINUED | OUTPATIENT
Start: 2025-02-12 | End: 2025-02-12

## 2025-02-12 RX ORDER — OSELTAMIVIR PHOSPHATE 30 MG/1
30 CAPSULE ORAL EVERY 12 HOURS
Qty: 8 CAPSULE | Refills: 0 | Status: SHIPPED | OUTPATIENT
Start: 2025-02-12 | End: 2025-02-16

## 2025-02-12 RX ORDER — CEVIMELINE HYDROCHLORIDE 30 MG/1
30 CAPSULE ORAL 3 TIMES DAILY
Status: DISCONTINUED | OUTPATIENT
Start: 2025-02-12 | End: 2025-02-12 | Stop reason: HOSPADM

## 2025-02-12 RX ORDER — MONTELUKAST SODIUM 10 MG/1
10 TABLET ORAL NIGHTLY
Status: DISCONTINUED | OUTPATIENT
Start: 2025-02-12 | End: 2025-02-12 | Stop reason: HOSPADM

## 2025-02-12 RX ORDER — ACETAMINOPHEN 160 MG/5ML
650 SOLUTION ORAL EVERY 4 HOURS PRN
Status: DISCONTINUED | OUTPATIENT
Start: 2025-02-12 | End: 2025-02-12

## 2025-02-12 RX ORDER — OSELTAMIVIR PHOSPHATE 30 MG/1
30 CAPSULE ORAL 2 TIMES DAILY
Status: DISCONTINUED | OUTPATIENT
Start: 2025-02-12 | End: 2025-02-12

## 2025-02-12 RX ORDER — CETIRIZINE HYDROCHLORIDE 10 MG/1
10 TABLET ORAL DAILY
Status: DISCONTINUED | OUTPATIENT
Start: 2025-02-12 | End: 2025-02-12

## 2025-02-12 RX ORDER — FLUTICASONE FUROATE AND VILANTEROL 200; 25 UG/1; UG/1
1 POWDER RESPIRATORY (INHALATION)
Status: DISCONTINUED | OUTPATIENT
Start: 2025-02-12 | End: 2025-02-12 | Stop reason: CLARIF

## 2025-02-12 RX ORDER — IPRATROPIUM BROMIDE AND ALBUTEROL SULFATE 2.5; .5 MG/3ML; MG/3ML
3 SOLUTION RESPIRATORY (INHALATION)
Status: DISCONTINUED | OUTPATIENT
Start: 2025-02-12 | End: 2025-02-12 | Stop reason: HOSPADM

## 2025-02-12 RX ORDER — ONDANSETRON 4 MG/1
4 TABLET, FILM COATED ORAL EVERY 8 HOURS PRN
Status: DISCONTINUED | OUTPATIENT
Start: 2025-02-12 | End: 2025-02-12 | Stop reason: HOSPADM

## 2025-02-12 RX ORDER — CYCLOBENZAPRINE HCL 10 MG
10 TABLET ORAL 3 TIMES DAILY PRN
Status: DISCONTINUED | OUTPATIENT
Start: 2025-02-12 | End: 2025-02-12 | Stop reason: HOSPADM

## 2025-02-12 RX ORDER — IPRATROPIUM BROMIDE AND ALBUTEROL SULFATE 2.5; .5 MG/3ML; MG/3ML
3 SOLUTION RESPIRATORY (INHALATION) EVERY 2 HOUR PRN
Status: DISCONTINUED | OUTPATIENT
Start: 2025-02-12 | End: 2025-02-12 | Stop reason: HOSPADM

## 2025-02-12 RX ORDER — OSELTAMIVIR PHOSPHATE 75 MG/1
75 CAPSULE ORAL ONCE
Status: COMPLETED | OUTPATIENT
Start: 2025-02-12 | End: 2025-02-12

## 2025-02-12 RX ORDER — ACETAMINOPHEN 160 MG/5ML
650 SOLUTION ORAL EVERY 4 HOURS PRN
Status: DISCONTINUED | OUTPATIENT
Start: 2025-02-12 | End: 2025-02-12 | Stop reason: HOSPADM

## 2025-02-12 RX ORDER — PREGABALIN 75 MG/1
75 CAPSULE ORAL 2 TIMES DAILY
Status: DISCONTINUED | OUTPATIENT
Start: 2025-02-12 | End: 2025-02-12 | Stop reason: HOSPADM

## 2025-02-12 RX ORDER — ACETAMINOPHEN 650 MG/1
650 SUPPOSITORY RECTAL EVERY 4 HOURS PRN
Status: DISCONTINUED | OUTPATIENT
Start: 2025-02-12 | End: 2025-02-12

## 2025-02-12 RX ORDER — ACETAMINOPHEN 650 MG/1
650 SUPPOSITORY RECTAL EVERY 4 HOURS PRN
Status: DISCONTINUED | OUTPATIENT
Start: 2025-02-12 | End: 2025-02-12 | Stop reason: HOSPADM

## 2025-02-12 RX ORDER — ONDANSETRON HYDROCHLORIDE 2 MG/ML
4 INJECTION, SOLUTION INTRAVENOUS EVERY 8 HOURS PRN
Status: DISCONTINUED | OUTPATIENT
Start: 2025-02-12 | End: 2025-02-12 | Stop reason: HOSPADM

## 2025-02-12 RX ORDER — FORMOTEROL FUMARATE 20 UG/2ML
20 SOLUTION RESPIRATORY (INHALATION)
Status: DISCONTINUED | OUTPATIENT
Start: 2025-02-12 | End: 2025-02-12 | Stop reason: HOSPADM

## 2025-02-12 RX ORDER — IPRATROPIUM BROMIDE AND ALBUTEROL SULFATE 2.5; .5 MG/3ML; MG/3ML
3 SOLUTION RESPIRATORY (INHALATION) 3 TIMES DAILY PRN
Qty: 45 ML | Refills: 0 | Status: SHIPPED | OUTPATIENT
Start: 2025-02-12 | End: 2025-02-17

## 2025-02-12 RX ORDER — POLYETHYLENE GLYCOL 3350 17 G/17G
17 POWDER, FOR SOLUTION ORAL DAILY PRN
Status: DISCONTINUED | OUTPATIENT
Start: 2025-02-12 | End: 2025-02-12 | Stop reason: HOSPADM

## 2025-02-12 RX ORDER — PAROXETINE HYDROCHLORIDE 20 MG/1
10 TABLET, FILM COATED ORAL EVERY MORNING
Status: DISCONTINUED | OUTPATIENT
Start: 2025-02-12 | End: 2025-02-12 | Stop reason: HOSPADM

## 2025-02-12 RX ORDER — CETIRIZINE HYDROCHLORIDE 10 MG/1
10 TABLET ORAL DAILY
Status: DISCONTINUED | OUTPATIENT
Start: 2025-02-12 | End: 2025-02-12 | Stop reason: HOSPADM

## 2025-02-12 RX ORDER — PREDNISONE 20 MG/1
40 TABLET ORAL DAILY
Qty: 10 TABLET | Refills: 0 | Status: SHIPPED | OUTPATIENT
Start: 2025-02-13 | End: 2025-02-18

## 2025-02-12 RX ORDER — BUDESONIDE 0.5 MG/2ML
0.5 INHALANT ORAL
Status: DISCONTINUED | OUTPATIENT
Start: 2025-02-12 | End: 2025-02-12 | Stop reason: HOSPADM

## 2025-02-12 RX ORDER — IPRATROPIUM BROMIDE AND ALBUTEROL SULFATE 2.5; .5 MG/3ML; MG/3ML
3 SOLUTION RESPIRATORY (INHALATION)
Status: DISCONTINUED | OUTPATIENT
Start: 2025-02-12 | End: 2025-02-12

## 2025-02-12 RX ORDER — ENOXAPARIN SODIUM 100 MG/ML
40 INJECTION SUBCUTANEOUS EVERY 24 HOURS
Status: DISCONTINUED | OUTPATIENT
Start: 2025-02-12 | End: 2025-02-12 | Stop reason: HOSPADM

## 2025-02-12 RX ORDER — ACETAMINOPHEN 325 MG/1
650 TABLET ORAL EVERY 4 HOURS PRN
Status: DISCONTINUED | OUTPATIENT
Start: 2025-02-12 | End: 2025-02-12 | Stop reason: HOSPADM

## 2025-02-12 RX ORDER — OSELTAMIVIR PHOSPHATE 30 MG/1
30 CAPSULE ORAL EVERY 12 HOURS
Status: DISCONTINUED | OUTPATIENT
Start: 2025-02-12 | End: 2025-02-12 | Stop reason: HOSPADM

## 2025-02-12 RX ORDER — PANTOPRAZOLE SODIUM 40 MG/1
40 TABLET, DELAYED RELEASE ORAL
Status: DISCONTINUED | OUTPATIENT
Start: 2025-02-12 | End: 2025-02-12 | Stop reason: HOSPADM

## 2025-02-12 RX ADMIN — PREGABALIN 75 MG: 75 CAPSULE ORAL at 09:42

## 2025-02-12 RX ADMIN — PANTOPRAZOLE SODIUM 40 MG: 40 TABLET, DELAYED RELEASE ORAL at 09:46

## 2025-02-12 RX ADMIN — OSELTAMAVIR PHOSPHATE 75 MG: 75 CAPSULE ORAL at 03:05

## 2025-02-12 RX ADMIN — METHYLPREDNISOLONE SODIUM SUCCINATE 40 MG: 40 INJECTION, POWDER, FOR SOLUTION INTRAMUSCULAR; INTRAVENOUS at 14:29

## 2025-02-12 RX ADMIN — FORMOTEROL FUMARATE DIHYDRATE 20 MCG: 20 SOLUTION RESPIRATORY (INHALATION) at 08:05

## 2025-02-12 RX ADMIN — PAROXETINE 10 MG: 20 TABLET, FILM COATED ORAL at 09:42

## 2025-02-12 RX ADMIN — METHYLPREDNISOLONE SODIUM SUCCINATE 40 MG: 40 INJECTION, POWDER, FOR SOLUTION INTRAMUSCULAR; INTRAVENOUS at 06:39

## 2025-02-12 RX ADMIN — IPRATROPIUM BROMIDE AND ALBUTEROL SULFATE 3 ML: 2.5; .5 SOLUTION RESPIRATORY (INHALATION) at 08:05

## 2025-02-12 RX ADMIN — BUDESONIDE INHALATION 0.5 MG: 0.5 SUSPENSION RESPIRATORY (INHALATION) at 08:05

## 2025-02-12 RX ADMIN — CETIRIZINE HYDROCHLORIDE 10 MG: 10 TABLET, FILM COATED ORAL at 09:42

## 2025-02-12 RX ADMIN — CEVIMELINE HYDROCHLORIDE 30 MG: 30 CAPSULE ORAL at 09:41

## 2025-02-12 SDOH — ECONOMIC STABILITY: FOOD INSECURITY: WITHIN THE PAST 12 MONTHS, THE FOOD YOU BOUGHT JUST DIDN'T LAST AND YOU DIDN'T HAVE MONEY TO GET MORE.: NEVER TRUE

## 2025-02-12 SDOH — SOCIAL STABILITY: SOCIAL INSECURITY: WITHIN THE LAST YEAR, HAVE YOU BEEN AFRAID OF YOUR PARTNER OR EX-PARTNER?: NO

## 2025-02-12 SDOH — ECONOMIC STABILITY: INCOME INSECURITY: IN THE PAST 12 MONTHS HAS THE ELECTRIC, GAS, OIL, OR WATER COMPANY THREATENED TO SHUT OFF SERVICES IN YOUR HOME?: NO

## 2025-02-12 SDOH — SOCIAL STABILITY: SOCIAL INSECURITY
WITHIN THE LAST YEAR, HAVE YOU BEEN RAPED OR FORCED TO HAVE ANY KIND OF SEXUAL ACTIVITY BY YOUR PARTNER OR EX-PARTNER?: NO

## 2025-02-12 SDOH — SOCIAL STABILITY: SOCIAL INSECURITY: WITHIN THE LAST YEAR, HAVE YOU BEEN HUMILIATED OR EMOTIONALLY ABUSED IN OTHER WAYS BY YOUR PARTNER OR EX-PARTNER?: NO

## 2025-02-12 SDOH — ECONOMIC STABILITY: FOOD INSECURITY: WITHIN THE PAST 12 MONTHS, YOU WORRIED THAT YOUR FOOD WOULD RUN OUT BEFORE YOU GOT THE MONEY TO BUY MORE.: NEVER TRUE

## 2025-02-12 SDOH — SOCIAL STABILITY: SOCIAL INSECURITY: HAVE YOU HAD THOUGHTS OF HARMING ANYONE ELSE?: NO

## 2025-02-12 SDOH — SOCIAL STABILITY: SOCIAL INSECURITY: WERE YOU ABLE TO COMPLETE ALL THE BEHAVIORAL HEALTH SCREENINGS?: YES

## 2025-02-12 ASSESSMENT — COGNITIVE AND FUNCTIONAL STATUS - GENERAL
PATIENT BASELINE BEDBOUND: NO
MOBILITY SCORE: 24
DAILY ACTIVITIY SCORE: 24
DAILY ACTIVITIY SCORE: 24
MOBILITY SCORE: 24

## 2025-02-12 ASSESSMENT — ACTIVITIES OF DAILY LIVING (ADL)
GROOMING: INDEPENDENT
BATHING: INDEPENDENT
FEEDING YOURSELF: INDEPENDENT
HEARING - LEFT EAR: FUNCTIONAL
WALKS IN HOME: INDEPENDENT
ADEQUATE_TO_COMPLETE_ADL: YES
JUDGMENT_ADEQUATE_SAFELY_COMPLETE_DAILY_ACTIVITIES: YES
PATIENT'S MEMORY ADEQUATE TO SAFELY COMPLETE DAILY ACTIVITIES?: YES
TOILETING: INDEPENDENT
LACK_OF_TRANSPORTATION: NO
HEARING - RIGHT EAR: FUNCTIONAL
DRESSING YOURSELF: INDEPENDENT

## 2025-02-12 ASSESSMENT — LIFESTYLE VARIABLES
HOW MANY STANDARD DRINKS CONTAINING ALCOHOL DO YOU HAVE ON A TYPICAL DAY: 1 OR 2
AUDIT-C TOTAL SCORE: 3
AUDIT-C TOTAL SCORE: 3
SKIP TO QUESTIONS 9-10: 1
HOW OFTEN DO YOU HAVE 6 OR MORE DRINKS ON ONE OCCASION: NEVER
HOW OFTEN DO YOU HAVE A DRINK CONTAINING ALCOHOL: 2-3 TIMES A WEEK

## 2025-02-12 ASSESSMENT — PAIN - FUNCTIONAL ASSESSMENT
PAIN_FUNCTIONAL_ASSESSMENT: 0-10
PAIN_FUNCTIONAL_ASSESSMENT: 0-10

## 2025-02-12 ASSESSMENT — PATIENT HEALTH QUESTIONNAIRE - PHQ9
1. LITTLE INTEREST OR PLEASURE IN DOING THINGS: NOT AT ALL
SUM OF ALL RESPONSES TO PHQ9 QUESTIONS 1 & 2: 0
2. FEELING DOWN, DEPRESSED OR HOPELESS: NOT AT ALL

## 2025-02-12 ASSESSMENT — PAIN SCALES - GENERAL
PAINLEVEL_OUTOF10: 0 - NO PAIN
PAINLEVEL_OUTOF10: 0 - NO PAIN

## 2025-02-12 NOTE — CARE PLAN
The patient's goals for the shift include      The clinical goals for the shift include pt will remain hds      Problem: Pain - Adult  Goal: Verbalizes/displays adequate comfort level or baseline comfort level  Outcome: Progressing     Problem: Chronic Conditions and Co-morbidities  Goal: Patient's chronic conditions and co-morbidity symptoms are monitored and maintained or improved  Outcome: Progressing     Problem: Nutrition  Goal: Nutrient intake appropriate for maintaining nutritional needs  Outcome: Progressing     Problem: Respiratory  Goal: Verbalize decreased shortness of breath this shift  Outcome: Progressing

## 2025-02-12 NOTE — H&P
HPI: Radha Puri is a 64 y.o. female, with a PMH of asthma, COPD, hx of throat cancer, HLD, GERD, migraines, depression, R hip OA, chronic back pain, sciatica, Insomnia, who presented to [facility] ED on 2/11/2025 for shortness of breath.     Reports she started feeling short of breath and wheezy on Saturday with a wet cough and fevers. Went to urgent care on Sunday, received duoneb aerosols and discharged home on Medrol dosepak and Z pack. Initially felt better with quick decline again yesterday. Went back to urgent care and advised to go to the emergency room. Was not checked for flu/covid at urgent care on Sunday. 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     ED Course: VSS. Labs largely unremarkable, no leukocytosis   Positive for Influenza A  Chest x-ray just showed atelectasis versus scarring but no focal consolidation  Received duoneb aerosol x 3, solumedrol 125 IVP      Patient History   PMH: She has a past medical history of Acute bronchitis due to other specified organisms (08/06/2019), Atelectasis (06/24/2013), Carpal tunnel syndrome, unspecified upper limb (06/24/2013), Chest pain, unspecified (04/02/2013), Chronic obstructive pulmonary disease, unspecified, Cutaneous abscess of face (04/02/2013), Diverticulosis, Enthesopathy, unspecified (04/02/2013), Erysipelas (04/02/2013), Gastro-esophageal reflux disease with esophagitis, without bleeding (08/14/2014), GERD (gastroesophageal reflux disease), Hard to intubate, Headache, unspecified (04/02/2013), Larynx cancer (Multi), Migraine, unspecified, not intractable, without status migrainosus (11/28/2018), Occipital neuralgia, Other conditions influencing health status (06/24/2013), Pain in leg, unspecified (06/24/2013), Personal history of malignant neoplasm of unspecified site of lip, oral cavity, and pharynx (08/14/2014), Personal history of other diseases of the  digestive system (04/26/2019), Personal history of other specified conditions (06/09/2014), Personal history of other specified conditions (06/12/2018), Personal history of pneumonia (recurrent) (01/23/2014), Sprain of joints and ligaments of unspecified parts of neck, initial encounter (04/02/2013), and Zoster without complications (04/02/2013).    She has no past medical history of Dental disease.  PSH: She has a past surgical history that includes Rotator cuff repair (Right, 09/05/2013); Cervical fusion (09/12/2013); Other surgical history (08/16/2014); Sinus surgery (11/07/2013); Eye surgery (Bilateral, 11/07/2013); Knee arthroscopy w/ debridement (Right, 11/07/2013); Cervical discectomy (11/07/2013); Other surgical history; Other surgical history; Breast biopsy (Right); and Bunionectomy (Left).  SH: She reports that she quit smoking about 31 years ago. Her smoking use included cigarettes. She started smoking about 51 years ago. She has a 20 pack-year smoking history. She has been exposed to tobacco smoke. She has never used smokeless tobacco. She reports current alcohol use of about 2.0 standard drinks of alcohol per week. She reports current drug use. Drug: Marijuana.  FH: family history includes Cirrhosis in her mother; Lung cancer in her father.     Allergies   Allergen Reactions    Keflex [Cephalexin] Anxiety and Insomnia    Doxycycline Diarrhea     Current Outpatient Medications   Medication Instructions    albuterol 90 mcg/actuation inhaler 2 puffs, inhalation, Every 4 hours PRN    albuterol 2.5 mg, Every 4 hours PRN    APPLE CIDER VINEGAR ORAL 1 capsule, Daily    azithromycin (Zithromax) 250 mg tablet Take 2 tabs (500 mg) by mouth today, then take 1 tab daily for 4 days.    azithromycin (Zithromax) 250 mg tablet Take 2 tabs (500 mg) by mouth today, then take 1 tab daily for 4 days.    cetirizine (ZYRTEC) 10 mg capsule 1 capsule, Daily    cevimeline (EVOXAC) 30 mg, oral, 3 times daily    cyclobenzaprine  "(FLEXERIL) 10 mg, oral, 3 times daily PRN    diclofenac sodium (Voltaren) 1 % gel gel 1 Application, Topical, 4 times daily    ELDERBERRY FRUIT ORAL 1 tablet, Daily    eletriptan (RELPAX) 40 mg, oral, Once as needed    flurandrenolide (Cordran Tape Large Roll) 4 mcg/cm2 tape Daily PRN    fluticasone propion-salmeteroL (Advair Diskus) 500-50 mcg/dose diskus inhaler 1 puff, 2 times daily RT    ibuprofen 800 mg, oral, 3 times daily PRN    L. acidophilus/Bifid. animalis 15.5 billion cell capsule 1 capsule, Daily    lidocaine (Lidoderm) 5 % patch Apply 1 patch and leave in place for 12 hours, then remove and leave off for 12 hours.    methylPREDNISolone (Medrol Dospak) 4 mg tablets Follow schedule on package instructions    montelukast (SINGULAIR) 10 mg, oral, Nightly    omeprazole (PRILOSEC) 20 mg, oral, Daily before breakfast    PARoxetine (PAXIL) 10 mg, oral, Every morning    pregabalin (LYRICA) 75 mg, oral, 2 times daily    zolpidem (AMBIEN) 5 mg, oral, Nightly PRN     Review of Systems   ROS: 10-point review of systems was performed and is otherwise negative except as noted in HPI.        Heart Rate:  [85-93]   Temperature:  [36.7 °C (98.1 °F)-36.8 °C (98.3 °F)]   Respirations:  [19-20]   BP: (125-151)/()   Height:  [157.5 cm (5' 2\")]   Weight:  [56.7 kg (125 lb)]   Pulse Ox:  [93 %-98 %]      0-10 (Numeric) Pain Score: 0 - No pain   Vitals:    02/11/25 1800   Weight: 56.7 kg (125 lb)        Physical Exam:  Vitals and nursing notes reviewed.  GENERAL: Alert and awake, cooperative; in no acute distress  SKIN: Warm and dry, cap refill <2  HEENT: Normocephalic, PEERL, mucous membranes pink and moist  CARDIAC: Regular rate and rhythm, S1S2, no murmurs or abnormal heart sounds  CHEST: coarse, expiratory wheezing,on room air  ABDOMEN: soft, non-distended, non-tender with palpation  EXTREMITIES: No lower extremity edema, normal pulses all 4 extremities  NEURO: Alert and oriented, mental status at baseline, no focal " deficits  PSYCH: Behavior and affect as expected     Medications  ipratropium-albuteroL, 3 mL, nebulization, Once         Diagnostic Results   CBC- 2/11/2025: 10:33 PM  5.9 12.7 304    38.8      BMP- 2/11/2025: 10:33 PM  139 12 _ 99   4.2 0.77 30    Estimated Creatinine Clearance: 57.6 mL/min (by C-G formula based on SCr of 0.77 mg/dL).     CA: 8.3 PROTIEN: 6.4 ALT: 14 Total Bili: 0.3 Mg: _   PHOS: _ ALBUMIN: 3.9 AST: 13   Alk Phos: 79      COAGS- No results in last year.  _   _ _     CV Labs  BNP   Date/Time Value Ref Range Status   02/28/2018 10:56 AM 10 0 - 99 pg/mL Final     Comment:     .  <100 pg/mL - Heart failure unlikely  100-299 pg/mL - Intermediate probability of acute heart  .               failure exacerbation. Correlate with clinical  .               context and patient history.    >=300 pg/mL - Heart Failure likely. Correlate with clinical  .               context and patient history.  BNP testing is performed using different testing   methodology at [facility] than at other   [facility]. Direct result comparisons should   only be made within the same method.       POC HEMOGLOBIN A1c   Date/Time Value Ref Range Status   07/19/2023 08:09 AM 5.9 4.2 - 6.5 % Final     HEMOGLOBIN A1c   Date/Time Value Ref Range Status   01/31/2025 09:38 AM 5.9 (H) <5.7 % of total Hgb Final     Comment:     For someone without known diabetes, a hemoglobin   A1c value between 5.7% and 6.4% is consistent with  prediabetes and should be confirmed with a   follow-up test.     For someone with known diabetes, a value <7%  indicates that their diabetes is well controlled. A1c  targets should be individualized based on duration of  diabetes, age, comorbid conditions, and other  considerations.     This assay result is consistent with an increased risk  of diabetes.     Currently, no consensus exists regarding use of  hemoglobin A1c for diagnosis of diabetes for children.          Hemoglobin A1C   Date/Time  Value Ref Range Status   08/12/2024 08:17 AM 6.1 (H) see below % Final     LDL-CHOLESTEROL   Date/Time Value Ref Range Status   01/31/2025 09:38  (H) mg/dL (calc) Final     Comment:     Reference range: <100     Desirable range <100 mg/dL for primary prevention;    <70 mg/dL for patients with CHD or diabetic patients   with > or = 2 CHD risk factors.     LDL-C is now calculated using the Iza   calculation, which is a validated novel method providing   better accuracy than the Friedewald equation in the   estimation of LDL-C.   Rei REYES et al. JC. 2013;310(19): 1158-8331   (http://[employer].com.[employer]/faq/SSH710)       LDL Calculated   Date/Time Value Ref Range Status   08/12/2024 08:17  (H) <=99 mg/dL Final     Comment:                                 Near   Borderline      AGE      Desirable  Optimal    High     High     Very High     0-19 Y     0 - 109     ---    110-129   >/= 130     ----    20-24 Y     0 - 119     ---    120-159   >/= 160     ----      >24 Y     0 -  99   100-129  130-159   160-189     >/=190       Pertinent Imaging  XR chest 1 view    Result Date: 2/11/2025  Interpreted By:  Gail Aguilera, STUDY: XR CHEST 1 VIEW;  2/11/2025 8:52 pm   INDICATION: Signs/Symptoms:cough flu a     COMPARISON: Chest x-ray 02/28/2018, 02/05/2021   ACCESSION NUMBER(S): [id number]   ORDERING CLINICIAN: GUMARO BHATT   TECHNIQUE: Upright frontal view of the chest was obtained .   FINDINGS: Redemonstration of electronic device overlying the left upper hemithorax with the leads extending superiorly into the soft tissues of the left neck with the superior extent not included on this exam.   The cardiomediastinal silhouette is within normal limits.   No focal consolidation, pleural effusion or pneumothorax. There is subsegmental atelectasis/scarring at the bilateral lung bases.   Postsurgical changes at the proximal right humerus.       1.  Subsegmental atelectasis/scarring at  the bilateral lung bases. Otherwise, no acute radiographic finding at the chest.       MACRO: None.   Signed by: Gail Aguilera 2/11/2025 9:10 PM Dictation workstation:   [id number]    ECG 12 Lead    Result Date: 2/11/2025  SR            Assessment/Plan   Radha Puri is a 64 y.o. female, with a PMH of asthma, COPD, hx of throat cancer, HLD, GERD, migraines, depression, R hip OA, chronic back pain, sciatica, Insomnia, who presented to [facility] ED on 2/11/2025 for shortness of breath.     Reports she started feeling short of breath and wheezy on Saturday with a wet cough and fevers. Went to urgent care on Sunday, received duoneb aerosols and discharged home on Medrol dosepak and Z pack. Initially felt better with quick decline again yesterday. Went back to urgent care and advised to go to the emergency room. Was not checked for flu/covid at urgent care on Sunday. 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     ED Course: VSS. Labs largely unremarkable, no leukocytosis   Positive for Influenza A  Chest x-ray just showed atelectasis versus scarring but no focal consolidation  Received duoneb aerosol x 3, solumedrol 125 IVP    Influenza A  Asthma/COPD exacerbation   Start Tamiflu  Solumedrol q8h  duonebs as needed  Mucolytic's as needed  Antipyretics as needed  Don't see a need for further antibiotics at this time, did receive roughly 3 days of zithro OP  Supplemental 02 as needed    GERD  PPI    Depression  Continue Paxil    GI/VTE PPX: PPI, SQ Lovenox    Chart, medical history, and labs/testing reviewed in detail.      Disposition: Discharge home once medically cleared and stable, pending  improvement of symptoms     JENELLE Sanchez-CNP   Observation/Internal Med [facility]  [facility]  02/12/25  12:06 AM  Total time of 45 minutes spent on professional and overall care, with >50% of time dedicated to  counseling/coordination of care.

## 2025-02-12 NOTE — CARE PLAN
The clinical goals for the shift include Pt will remain free from SOB at rest.    Problem: Pain - Adult  Goal: Verbalizes/displays adequate comfort level or baseline comfort level  Outcome: Progressing     Problem: Safety - Adult  Goal: Free from fall injury  Outcome: Met     Problem: Discharge Planning  Goal: Discharge to home or other facility with appropriate resources  Outcome: Met     Problem: Respiratory  Goal: No signs of respiratory distress (eg. Use of accessory muscles. Peds grunting)  Outcome: Met  Goal: Verbalize decreased shortness of breath this shift  Outcome: Progressing

## 2025-02-12 NOTE — DISCHARGE INSTRUCTIONS
Please take your duo-neb as needed for the next 5 days. Please do not take your duo neb and albuterol neb solution together as they both have albuterol.

## 2025-02-12 NOTE — ED PROVIDER NOTES
HPI   Chief Complaint   Patient presents with    Shortness of Breath    Cough       HPI  Patient is a 64-year-old female with a past medical history significant for asthma, recurrent pneumonia who presents emergency room with cough, shortness of breath and chest tightness.  Symptoms started at the end of last week.  She has been seen at urgent care twice already in the first time was started on Medrol Dosepak and a Z-Daron which she started on Sunday.  She states that she feels like her chest has rocks and she is still coughing and feels like her usual pneumonia.  Today she did multiple albuterol nebulizers at home without improvement.  She is wheezy, feels tight and went to urgent care for evaluation but they were unable to get a chest x-ray and sent her over for evaluation.  They did give her a DuoNeb and she says that it only helped a little bit.  She denies any fever, nausea, vomiting, diarrhea, leg pain or swelling.  Chest feels tight but not painful.      PMHx: As above  PSHx: Denies pertinent  FamilyHx: Exposure to sick contacts  SocialHx: Had flu and COVID vaccinations, quit smoking remotely  Allergies: Keflex, doxycycline  Medications: See Medication Reconciliation     ROS  As above otherwise denies      Physical Exam    GENERAL: Awake and Alert, No Acute Distress  HEENT: AT/NC, PERRL, EOMI, Normal Oropharynx, No Signs of Dehydration  NECK: Normal Inspection, No JVD  CARDIOVASCULAR: RRR, No M/R/G  RESPIRATORY: Diffuse bilateral inspiratory and expiratory wheezes without Rales or Rhonchi, Chest Wall Non-tender  ABDOMEN: Soft, non-tender abdomen, Normal Bowel Sounds, No Distention  BACK: No CVA Tenderness  SKIN: Normal Color, Warm, Dry, No Rashes   EXTREMITIES: Non-Tender, Full ROM, No Pedal Edema  NEURO: A&O x 3, Normal Motor and Sensation, Normal Mood and Affect    Nursing Assessment and Vitals Reviewed    KG showed a sinus rhythm at 74 bpm.  There are no significant interval prolongations or ischemic ST  changes.  No T wave inversions or axis deviation.    Medical Decision  Patient is a 64-year-old female with a past medical history significant for asthma, recurrent pneumonia who presents emergency room with cough, shortness of breath and chest tightness.  Symptoms started at the end of last week.  She has been seen at urgent care twice already in the first time was started on Medrol Dosepak and a Z-Daron which she started on Sunday.  She states that she feels like her chest has rocks and she is still coughing and feels like her usual pneumonia.  Today she did multiple albuterol nebulizers at home without improvement.  She is wheezy, feels tight and went to urgent care for evaluation but they were unable to get a chest x-ray and sent her over for evaluation.  They did give her a DuoNeb and she says that it only helped a little bit.  She denies any fever, nausea, vomiting, diarrhea, leg pain or swelling.  Chest feels tight but not painful.    On evaluation patient has repeated bouts of coughing.  She is saturating 91 to 93% while at rest with a good waveform.  She sounds diffusely wheezy but is able to speak in full sentences although not without coughing.  She has no leg pain or swelling.  Anticipate admission due to hypoxia while at rest and ongoing symptoms despite outpatient treatment.    Workup for patient included labs that did not reveal any emergent electrolyte imbalance.  She has no leukocytosis or anemia.  She is positive for influenza.  Chest x-ray showed atelectasis with no acute consolidation.  Patient is showing some improvement after breathing treatments and steroids.  She is admitted for further workup and management as well as reevaluation and repeat treatments.                Patient History   Past Medical History:   Diagnosis Date    Acute bronchitis due to other specified organisms 08/06/2019    Acute bacterial bronchitis    Atelectasis 06/24/2013    Atelectasis    Carpal tunnel syndrome,  unspecified upper limb 06/24/2013    Carpal tunnel syndrome    Chest pain, unspecified 04/02/2013    Chest pain    Chronic obstructive pulmonary disease, unspecified     Chronic obstructive pulmonary disease    Cutaneous abscess of face 04/02/2013    Abscess of face    Diverticulosis     Enthesopathy, unspecified 04/02/2013    Tendonitis    Erysipelas 04/02/2013    Erysipelas    Gastro-esophageal reflux disease with esophagitis, without bleeding 08/14/2014    Gastro-esophageal reflux disease with esophagitis    GERD (gastroesophageal reflux disease)     Hard to intubate     Headache, unspecified 04/02/2013    Headache    Larynx cancer (Multi)     surgery to remove only    Migraine, unspecified, not intractable, without status migrainosus 11/28/2018    Headache, migraine    Occipital neuralgia     Other conditions influencing health status 06/24/2013    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    Pain in leg, unspecified 06/24/2013    Lower extremity pain    Personal history of malignant neoplasm of unspecified site of lip, oral cavity, and pharynx 08/14/2014    History of malignant neoplasm of pharynx    Personal history of other diseases of the digestive system 04/26/2019    History of glossitis    Personal history of other specified conditions 06/09/2014    History of heartburn    Personal history of other specified conditions 06/12/2018    History of dyspnea    Personal history of pneumonia (recurrent) 01/23/2014    History of pneumonia    Sprain of joints and ligaments of unspecified parts of neck, initial encounter 04/02/2013    Neck sprain    Zoster without complications 04/02/2013    Herpes zoster     Past Surgical History:   Procedure Laterality Date    BREAST BIOPSY Right     benign    BUNIONECTOMY Left     CERVICAL DISCECTOMY  11/07/2013    Spinal Diskectomy Cervical    CERVICAL FUSION  09/12/2013    Cervical Vertebral Fusion    EYE SURGERY Bilateral 11/07/2013    Eye Surgery    KNEE ARTHROSCOPY W/  DEBRIDEMENT Right 2013    Arthroscopy Knee Right    OTHER SURGICAL HISTORY  2014    Throat Surgery to reove larynx cancer (several times)    OTHER SURGICAL HISTORY      occipital nerve stimulator to left upper chest    OTHER SURGICAL HISTORY      several cervical injections for pain    ROTATOR CUFF REPAIR Right 2013    Rotator Cuff Repair x2    SINUS SURGERY  2013    Sinus Surgery septoplasty x 2, nasal fracture x 2     Family History   Problem Relation Name Age of Onset    Cirrhosis Mother      Lung cancer Father       Social History     Tobacco Use    Smoking status: Former     Current packs/day: 0.00     Average packs/day: 1 pack/day for 20.0 years (20.0 ttl pk-yrs)     Types: Cigarettes     Start date:      Quit date:      Years since quittin.1     Passive exposure: Past    Smokeless tobacco: Never   Vaping Use    Vaping status: Never Used   Substance Use Topics    Alcohol use: Yes     Alcohol/week: 2.0 standard drinks of alcohol     Types: 2 Glasses of wine per week    Drug use: Yes     Types: Marijuana     Comment: gummies nightly for sleep       Physical Exam   ED Triage Vitals   Temperature Heart Rate Respirations BP   25 1800 25 1800 25 1800 25 1801   36.8 °C (98.3 °F) 87 20 151/72      Pulse Ox Temp src Heart Rate Source Patient Position   25 1800 -- -- --   94 %         BP Location FiO2 (%)     -- --             Physical Exam      ED Course & MDM   Diagnoses as of 25   Influenza   Moderate persistent asthma with exacerbation ([facility])                 No data recorded     Josselyn Coma Scale Score: 15 (255 : Bety Walker RN)                           Medical Decision Making      Procedure  Procedures     Seda Murray MD  25 [phone]

## 2025-02-12 NOTE — PROGRESS NOTES
"Pharmacy Medication History Review    Radha Puri \"Hilda\" is a 64 y.o. female admitted for Influenza A. Pharmacy reviewed the patient's [id number] medications and allergies for accuracy.    Reviewed home medications with patient.     Prior to Admission Medications   Prescriptions   APPLE CIDER VINEGAR ORAL   Sig: Take 1 capsule by mouth once daily.   ELDERBERRY FRUIT ORAL   Sig: Take 1 tablet by mouth once daily.   L. acidophilus/Bifid. animalis 15.5 billion cell capsule   Sig: Take 1 capsule by mouth once daily.   PARoxetine (Paxil) 10 mg tablet   Sig: Take 1 tablet (10 mg) by mouth once daily in the morning.   albuterol 2.5 mg /3 mL (0.083 %) nebulizer solution   Sig: Take 3 mL (2.5 mg) by nebulization every 4 hours if needed.   albuterol 90 mcg/actuation inhaler   Sig: Inhale 2 puffs every 4 hours if needed for wheezing.   azithromycin (Zithromax) 250 mg tablet   Sig: Take 2 tabs (500 mg) by mouth today, then take 1 tab daily for 4 days.   cetirizine (ZYRTEC) 10 mg capsule   Si capsule (10 mg) once daily.   cevimeline (Evoxac) 30 mg capsule   Sig: Take 1 capsule (30 mg) by mouth 3 times a day.   Patient taking differently: Take 1 capsule (30 mg) by mouth 2 times a day. With Lyrica   cyclobenzaprine (Flexeril) 10 mg tablet   Sig: Take 1 tablet (10 mg) by mouth 3 times a day as needed for muscle spasms.   diclofenac sodium (Voltaren) 1 % gel gel   Sig: Apply 1 Application topically 4 times a day.   Patient taking differently: Apply 4.5 inches (4 g) topically 4 times a day as needed.   eletriptan (Relpax) 40 mg tablet   Sig: Take 1 tablet (40 mg) by mouth 1 time if needed for migraine.   flurandrenolide (Cordran Tape Large Roll) 4 mcg/cm2 tape   Sig: Apply topically once daily as needed.   fluticasone propion-salmeteroL (Advair Diskus) 500-50 mcg/dose diskus inhaler   Sig: Inhale 1 puff 2 times a day.   ibuprofen 800 mg tablet   Sig: TAKE 1 TABLET (800 MG) BY MOUTH 3 TIMES A DAY AS NEEDED FOR MILD " PAIN (1 - 3) (PAIN).   lidocaine (Lidoderm) 5 % patch   Sig: Apply 1 patch and leave in place for 12 hours, then remove and leave off for 12 hours.   methylPREDNISolone (Medrol Dospak) 4 mg tablets   Sig: Follow schedule on package instructions   montelukast (Singulair) 10 mg tablet   Sig: Take 1 tablet (10 mg) by mouth once daily at bedtime.   Patient taking differently: Take 1 tablet (10 mg) by mouth once daily.   omeprazole (PriLOSEC) 20 mg DR capsule   Sig: Take 1 capsule (20 mg) by mouth once daily in the morning. Take before meals.   pregabalin (Lyrica) 75 mg capsule   Sig: Take 1 capsule (75 mg) by mouth 2 times a day.   zolpidem (Ambien) 5 mg tablet   Sig: Take 1 tablet (5 mg) by mouth as needed at bedtime for sleep.           VIVIEN KENNEDY

## 2025-02-12 NOTE — NURSING NOTE

## 2025-02-13 ENCOUNTER — PATIENT OUTREACH (OUTPATIENT)
Dept: PRIMARY CARE | Facility: CLINIC | Age: 65
End: 2025-02-13
Payer: COMMERCIAL

## 2025-02-13 NOTE — PROGRESS NOTES
Discharge Facility: [facility]   Discharge Diagnosis: Influenza A  Admission Date: 2/11/25  Discharge Date: 2/12/25    PCP Appointment Date: Message routed to office for scheduling     Specialist Appointment Date: N/a   Hospital Encounter and Summary Linked: ED to Hosp-Admission (Discharged) with Alexis Clarke MD; Seda Murray MD (02/11/2025)   See discharge assessment below for further details  Two attempts were made to reach patient within two business days after discharge. Voicemail left with contact information for patient to call back with any non-emergent questions or concerns.

## 2025-02-14 NOTE — DISCHARGE SUMMARY
Discharge Diagnosis  Influenza A    Issues Requiring Follow-Up  PCP    Discharge Meds     Medication List      START taking these medications     ipratropium-albuteroL 0.5-2.5 mg/3 mL nebulizer solution; Commonly known   as: Duo-Neb; Take 3 mL by nebulization 3 times a day as needed for   wheezing for up to 5 days.   oseltamivir 30 mg capsule; Commonly known as: Tamiflu; Take 1 capsule   (30 mg) by mouth every 12 hours for 4 days.   predniSONE 20 mg tablet; Commonly known as: Deltasone; Take 2 tablets   (40 mg) by mouth once daily for 5 days. Do not start before February 13, 2025.     CONTINUE taking these medications     * albuterol 2.5 mg /3 mL (0.083 %) nebulizer solution   * albuterol 90 mcg/actuation inhaler; Inhale 2 puffs every 4 hours if   needed for wheezing.   APPLE CIDER VINEGAR ORAL   cetirizine 10 mg capsule; Commonly known as: ZYRTEC   cevimeline 30 mg capsule; Commonly known as: Evoxac; Take 1 capsule (30   mg) by mouth 3 times a day.   Cordran Tape Large Roll 4 mcg/cm2 tape; Generic drug: flurandrenolide   cyclobenzaprine 10 mg tablet; Commonly known as: Flexeril; Take 1 tablet   (10 mg) by mouth 3 times a day as needed for muscle spasms.   diclofenac sodium 1 % gel; Commonly known as: Voltaren; Apply 1   Application topically 4 times a day.   ELDERBERRY FRUIT ORAL   eletriptan 40 mg tablet; Commonly known as: Relpax; Take 1 tablet (40   mg) by mouth 1 time if needed for migraine.   fluticasone propion-salmeteroL 500-50 mcg/dose diskus inhaler; Commonly   known as: Advair Diskus   ibuprofen 800 mg tablet; TAKE 1 TABLET (800 MG) BY MOUTH 3 TIMES A DAY   AS NEEDED FOR MILD PAIN (1 - 3) (PAIN).   L. acidophilus/Bifid. animalis 15.5 billion cell capsule   lidocaine 5 % patch; Commonly known as: Lidoderm; Apply 1 patch and   leave in place for 12 hours, then remove and leave off for 12 hours.   montelukast 10 mg tablet; Commonly known as: Singulair; Take 1 tablet   (10 mg) by mouth once daily at  bedtime.   omeprazole 20 mg DR capsule; Commonly known as: PriLOSEC; Take 1 capsule   (20 mg) by mouth once daily in the morning. Take before meals.   PARoxetine 10 mg tablet; Commonly known as: Paxil; Take 1 tablet (10 mg)   by mouth once daily in the morning.   pregabalin 75 mg capsule; Commonly known as: Lyrica; Take 1 capsule (75   mg) by mouth 2 times a day.   zolpidem 5 mg tablet; Commonly known as: Ambien; Take 1 tablet (5 mg) by   mouth as needed at bedtime for sleep.  * This list has 2 medication(s) that are the same as other medications   prescribed for you. Read the directions carefully, and ask your doctor or   other care provider to review them with you.     STOP taking these medications     azithromycin 250 mg tablet; Commonly known as: Zithromax   methylPREDNISolone 4 mg tablets; Commonly known as: Medrol Dospak       Test Results Pending At Discharge  Pending Labs       No current pending labs.            Hospital Course  Radha Puri is a 64 y.o. female, with a PMH of asthma, COPD, hx of throat cancer, HLD, GERD, migraines, depression, R hip OA, chronic back pain, sciatica, Insomnia, who presented to [facility] ED on 2/11/2025 for shortness of breath.      Reports she started feeling short of breath and wheezy on Saturday with a wet cough and fevers. Went to urgent care on Sunday, received duoneb aerosols and discharged home on Medrol dosepak and Z pack. Initially felt better with quick decline again yesterday. Went back to urgent care and advised to go to the emergency room. Was not checked for flu/covid at urgent care on Sunday. 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      ED Course: VSS. Labs largely unremarkable, no leukocytosis   Positive for Influenza A  Chest x-ray just showed atelectasis versus scarring but no focal consolidation  Received duoneb aerosol x 3, solumedrol 125 IVP     Influenza  A  Asthma/COPD exacerbation   -Continue Tamiflu x 4 days  -Duoneb solution as needed prescribed for home, advised pt to not take both duoneb and albuterol solution she already has at home together  -Don't see a need for further antibiotics at this time, did receive roughly 3 days of zithro OP  -prednisone burst taper x 5 days for op    Lungs sound clear on auscultation, pt feels much improved    Pertinent Physical Exam At Time of Discharge  Physical Exam  Constitutional:       Appearance: Normal appearance.   Cardiovascular:      Rate and Rhythm: Normal rate and regular rhythm.   Pulmonary:      Effort: Pulmonary effort is normal.      Breath sounds: Normal breath sounds.   Skin:     General: Skin is warm and dry.   Neurological:      Mental Status: She is alert.         Outpatient Follow-Up  Future Appointments   Date Time Provider Department Center   8/1/2025  7:30 AM Elizabeth Donahue DO [id number]     Vitals and labs reviewed. Patient stable for discharge. Both plan and discharge discussed with Dr. Clarke and the interdisciplinary team.    Yuridia Ordoñez PA-C

## 2025-02-16 DIAGNOSIS — G44.009 MIGRAINE-CLUSTER HEADACHE SYNDROME: ICD-10-CM

## 2025-02-17 RX ORDER — ELETRIPTAN HYDROBROMIDE 40 MG/1
40 TABLET, FILM COATED ORAL ONCE AS NEEDED
Qty: 6 TABLET | Refills: 5 | Status: SHIPPED | OUTPATIENT
Start: 2025-02-17

## 2025-02-21 DIAGNOSIS — J45.909 ASTHMA, UNSPECIFIED ASTHMA SEVERITY, UNSPECIFIED WHETHER COMPLICATED, UNSPECIFIED WHETHER PERSISTENT (HHS-HCC): Primary | ICD-10-CM

## 2025-02-21 DIAGNOSIS — J44.9 CHRONIC OBSTRUCTIVE PULMONARY DISEASE, UNSPECIFIED COPD TYPE (MULTI): ICD-10-CM

## 2025-02-24 DIAGNOSIS — M54.31 SCIATICA OF RIGHT SIDE: ICD-10-CM

## 2025-02-24 RX ORDER — IBUPROFEN 800 MG/1
800 TABLET ORAL 3 TIMES DAILY PRN
Qty: 90 TABLET | Refills: 5 | Status: SHIPPED | OUTPATIENT
Start: 2025-02-24 | End: 2026-02-24

## 2025-04-23 ENCOUNTER — APPOINTMENT (OUTPATIENT)
Dept: ORTHOPEDIC SURGERY | Facility: CLINIC | Age: 65
End: 2025-04-23
Payer: COMMERCIAL

## 2025-04-23 ENCOUNTER — PREP FOR PROCEDURE (OUTPATIENT)
Dept: ORTHOPEDIC SURGERY | Facility: CLINIC | Age: 65
End: 2025-04-23

## 2025-04-23 VITALS — BODY MASS INDEX: 23.92 KG/M2 | HEIGHT: 62 IN | WEIGHT: 130 LBS

## 2025-04-23 DIAGNOSIS — M19.049 CMC DJD(CARPOMETACARPAL DEGENERATIVE JOINT DISEASE), LOCALIZED PRIMARY, UNSPECIFIED LATERALITY: ICD-10-CM

## 2025-04-23 PROCEDURE — 1157F ADVNC CARE PLAN IN RCRD: CPT | Performed by: ORTHOPAEDIC SURGERY

## 2025-04-23 PROCEDURE — 3008F BODY MASS INDEX DOCD: CPT | Performed by: ORTHOPAEDIC SURGERY

## 2025-04-23 PROCEDURE — 1159F MED LIST DOCD IN RCRD: CPT | Performed by: ORTHOPAEDIC SURGERY

## 2025-04-23 PROCEDURE — 1160F RVW MEDS BY RX/DR IN RCRD: CPT | Performed by: ORTHOPAEDIC SURGERY

## 2025-04-23 PROCEDURE — 1036F TOBACCO NON-USER: CPT | Performed by: ORTHOPAEDIC SURGERY

## 2025-04-23 PROCEDURE — 99214 OFFICE O/P EST MOD 30 MIN: CPT | Performed by: ORTHOPAEDIC SURGERY

## 2025-04-23 PROCEDURE — 1123F ACP DISCUSS/DSCN MKR DOCD: CPT | Performed by: ORTHOPAEDIC SURGERY

## 2025-04-23 PROCEDURE — 1125F AMNT PAIN NOTED PAIN PRSNT: CPT | Performed by: ORTHOPAEDIC SURGERY

## 2025-04-23 ASSESSMENT — ENCOUNTER SYMPTOMS
OCCASIONAL FEELINGS OF UNSTEADINESS: 0
DEPRESSION: 0
LOSS OF SENSATION IN FEET: 0

## 2025-04-23 ASSESSMENT — PAIN - FUNCTIONAL ASSESSMENT: PAIN_FUNCTIONAL_ASSESSMENT: 0-10

## 2025-04-23 ASSESSMENT — PAIN SCALES - GENERAL: PAINLEVEL_OUTOF10: 7

## 2025-04-23 NOTE — H&P (VIEW-ONLY)
64 y.o. female presents today for follow up of bilateral base of thumb pain. The patient reports symptoms for months, getting worse recently. Pain is controlled. Patient reports no numbness and tingling.  Reports no previous surgeries or trauma to the area.  Reports pain worse with use, better at rest.   Pain dull ache, sharp at times.   Pain worse opening bottles and jars.  Right worse than left.  Had injections in the past, about 6 months ago, helping less.  Would like surgery on the right.    Review of Systems    Constitutional: see HPI, no fever, no chills, not feeling tired, no significant weight gain or weight loss.   Eyes: No vision changes  ENT: no nosebleeds.   Cardiovascular: no chest pain.   Respiratory: no shortness of breath and no cough.   Gastrointestinal: no abdominal pain, no nausea, no vomiting and no diarrhea.   Musculoskeletal: per HPI  Neurological: no headache, no gait disturbances  Psychiatric: no depression and no sleep disturbances.   Endocrine: no muscle weakness and no muscle cramps.   Hematologic/Lymphatic: no swollen glands and no tendency for easy bruising or excessive swelling.     Patient's past medical history, past surgical history, allergies, and medications have been reviewed unless otherwise noted in the chart.     CMC Arthritis Exam  Inspection:  no evidence of infection, no edema, no erythema, no ecchymosis, Palpation:  compartments are soft, pain with palpation over the Thumb CMC joint, Range of Motion:  full wrist and finger range of motion, Stability:  no wrist or finger instability detected, Strength:  5/5  and pinch strength, Skin:  intact, Vascular:  capillary refill <2 seconds distally, Sensation:  intact to light touch distally, Tests:  negative Finkelstein's , positive Thumb CMC Grind.      Constitutional   General appearance: Alert and in no acute distress. Well developed, well nourished.    Eyes   External Eye, Conjunctiva and lids: Normal external exam - pupils  were equal in size, round, reactive to light (PERRL) with normal accommodation and extraocular movements intact (EOMI).   Ears, Nose, Mouth, and Throat   Hearing: Normal.   Neck   Neck: No neck mass was observed. Supple.   Pulmonary   Respiratory effort: No respiratory distress.   Cardiovascular   Examination of extremities: No peripheral edema.   Psychiatric   Judgment and insight: Intact.   Orientation to person, place, and time: Alert and oriented x 3.       Mood and affect: Normal.      X-rays of the bilateral hands show moderate CMC arthritis, right worse than left    Bilateral thumb CMC arthritis  Surgery discussion: I discussed the diagnosis and treatment options with the patient today along with their associated risks and benefits. After thorough discussion, the patient has elected to proceed with surgical intervention. The patient understands the risks of,including, but not limited to, bleeding, infection, loss of life or limb, pain, permanent numbness, tingling, weakness, loss of motion, failure of the goals of surgery or the potential need for additional surgery. The patient would like to accept these risks and proceed. All questions were answered to the patients satisfaction and the patient seems satisfied with the plan.    Plan right thumb CMC arthroplasty with left thumb CMC steroid injection  Follow-up 2 weeks after new x-ray

## 2025-04-23 NOTE — PROGRESS NOTES
F/U Bilateral thumb CMC arthritis. Right is worse. They were given bilateral thumb CMC on 10/9/2024 which provided improvement for not as long as they would lave liked. They would like to discuss other options

## 2025-05-01 ENCOUNTER — ANESTHESIA EVENT (OUTPATIENT)
Dept: OPERATING ROOM | Facility: HOSPITAL | Age: 65
End: 2025-05-01
Payer: COMMERCIAL

## 2025-05-05 ENCOUNTER — HOSPITAL ENCOUNTER (OUTPATIENT)
Dept: RADIOLOGY | Facility: CLINIC | Age: 65
Discharge: HOME | End: 2025-05-05
Payer: COMMERCIAL

## 2025-05-05 ENCOUNTER — APPOINTMENT (OUTPATIENT)
Dept: ORTHOPEDIC SURGERY | Facility: CLINIC | Age: 65
End: 2025-05-05
Payer: COMMERCIAL

## 2025-05-05 VITALS — BODY MASS INDEX: 23.92 KG/M2 | HEIGHT: 62 IN | WEIGHT: 130 LBS

## 2025-05-05 DIAGNOSIS — M25.561 RIGHT KNEE PAIN, UNSPECIFIED CHRONICITY: ICD-10-CM

## 2025-05-05 DIAGNOSIS — M17.0 PRIMARY OSTEOARTHRITIS OF KNEES, BILATERAL: Primary | ICD-10-CM

## 2025-05-05 PROCEDURE — 73564 X-RAY EXAM KNEE 4 OR MORE: CPT | Mod: 50

## 2025-05-05 PROCEDURE — 3008F BODY MASS INDEX DOCD: CPT | Performed by: STUDENT IN AN ORGANIZED HEALTH CARE EDUCATION/TRAINING PROGRAM

## 2025-05-05 PROCEDURE — 1160F RVW MEDS BY RX/DR IN RCRD: CPT | Performed by: STUDENT IN AN ORGANIZED HEALTH CARE EDUCATION/TRAINING PROGRAM

## 2025-05-05 PROCEDURE — 20610 DRAIN/INJ JOINT/BURSA W/O US: CPT | Performed by: STUDENT IN AN ORGANIZED HEALTH CARE EDUCATION/TRAINING PROGRAM

## 2025-05-05 PROCEDURE — 99214 OFFICE O/P EST MOD 30 MIN: CPT | Performed by: STUDENT IN AN ORGANIZED HEALTH CARE EDUCATION/TRAINING PROGRAM

## 2025-05-05 PROCEDURE — 1159F MED LIST DOCD IN RCRD: CPT | Performed by: STUDENT IN AN ORGANIZED HEALTH CARE EDUCATION/TRAINING PROGRAM

## 2025-05-05 PROCEDURE — 1036F TOBACCO NON-USER: CPT | Performed by: STUDENT IN AN ORGANIZED HEALTH CARE EDUCATION/TRAINING PROGRAM

## 2025-05-05 PROCEDURE — 73564 X-RAY EXAM KNEE 4 OR MORE: CPT | Mod: BILATERAL PROCEDURE | Performed by: RADIOLOGY

## 2025-05-05 RX ADMIN — TRIAMCINOLONE ACETONIDE 40 MG: 40 INJECTION, SUSPENSION INTRA-ARTICULAR; INTRAMUSCULAR at 13:40

## 2025-05-05 RX ADMIN — LIDOCAINE HYDROCHLORIDE 2 ML: 10 INJECTION, SOLUTION INFILTRATION; PERINEURAL at 13:40

## 2025-05-05 ASSESSMENT — PAIN - FUNCTIONAL ASSESSMENT: PAIN_FUNCTIONAL_ASSESSMENT: 0-10

## 2025-05-05 ASSESSMENT — PAIN SCALES - GENERAL: PAINLEVEL_OUTOF10: 1

## 2025-05-05 NOTE — PROGRESS NOTES
PRIMARY CARE PHYSICIAN: Elizabeth Donahue DO  REFERRING PROVIDER: No referring provider defined for this encounter.     CONSULT ORTHOPAEDIC: Knee Evaluation    ASSESSMENT & PLAN    Impression: 64 y.o. female with bilateral knee osteoarthritis.    Plan:   I explained to the patient the nature of their diagnosis.  I reviewed their imaging studies with them.    Based on the history, physical exam and imaging studies above, the patient's presentation is consistent with the above diagnosis.  I had a long discussion with the patient regarding their presentation and the treatment options.  We discussed continued nonoperative versus operative management options as well as potential further diagnostic imaging.  I reviewed the patient's x-ray findings with her.  Her findings are most consistent with osteoarthritis of the bilateral knees. She was provided with a corticosteroid injection today as described below.  She tolerated this well.  She will focus on low impact activities and continue to maintain her quadricep strength and hamstring flexibility via her home exercise program.  She is not ready for any sort of arthroplasty procedure although she may benefit from an arthroplasty in the future.  She understands that it is at least 3 months from the injection until the time of surgery.    Follow-Up: Patient will follow-up as needed    At the end of the visit, all questions were answered in full. The patient is in agreement with the plan and recommendations. They will call the office with any questions/concerns.    Note dictated with [employer] software. Completed without full typed error editing and sent to avoid delay.       SUBJECTIVE  CHIEF COMPLAINT:   Chief Complaint   Patient presents with    Right Knee - Follow-up    Left Knee - Follow-up        HPI: Radha Puri returns to clinic for bilateral knee pain. Bilateral knee XR performed today. She is requesting bilateral knee CSI today.  She notes  that the previous injection lasted for a number of months.    6/19/24  Radha Puri is a 64 y.o. patient who presents today with left knee pain. States she has a partial hamstring tear that she found out about a couple months ago, caused her to put more strain on her knee.  She continues to have pain and dysfunction in the left knee.  She feels like she may have tore something.  She has pain persistently in the knee with associated swelling.  She has been going to physical therapy.  She has an upcoming trip and is requesting a corticosteroid injection.    They deny any constant or progressive numbness or tingling in their legs.     REVIEW OF SYSTEMS  Constitutional: See HPI for pain assessment, No significant weight loss, recent trauma  Cardiovascular: No chest pain, shortness of breath  Respiratory: No difficulty breathing, cough  Gastrointestinal: No nausea, vomiting, diarrhea, constipation  Musculoskeletal: Noted in HPI, positive for pain, restricted motion, stiffness  Integumentary: No rashes, easy bruising, redness   Neurological: no numbness or tingling in extremities, no gait disturbances   Psychiatric: No mood changes, memory changes, social issues  Heme/Lymph: no excessive swelling, easy bruising, excessive bleeding  ENT: No hearing changes  Eyes: No vision changes    Past Medical History:   Diagnosis Date    Acute bronchitis due to other specified organisms 08/06/2019    Acute bacterial bronchitis    Atelectasis 06/24/2013    Atelectasis    Carpal tunnel syndrome, unspecified upper limb 06/24/2013    Carpal tunnel syndrome    Chest pain, unspecified 04/02/2013    Chest pain    Chronic obstructive pulmonary disease, unspecified     Chronic obstructive pulmonary disease    Cutaneous abscess of face 04/02/2013    Abscess of face    Diverticulosis     Enthesopathy, unspecified 04/02/2013    Tendonitis    Erysipelas 04/02/2013    Erysipelas    Gastro-esophageal reflux disease with esophagitis, without  bleeding 08/14/2014    Gastro-esophageal reflux disease with esophagitis    GERD (gastroesophageal reflux disease)     Hard to intubate     Headache, unspecified 04/02/2013    Headache    Larynx cancer (Multi)     surgery to remove only    Migraine, unspecified, not intractable, without status migrainosus 11/28/2018    Headache, migraine    Occipital neuralgia     Other conditions influencing health status 06/24/2013    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    Pain in leg, unspecified 06/24/2013    Lower extremity pain    Personal history of malignant neoplasm of unspecified site of lip, oral cavity, and pharynx 08/14/2014    History of malignant neoplasm of pharynx    Personal history of other diseases of the digestive system 04/26/2019    History of glossitis    Personal history of other specified conditions 06/09/2014    History of heartburn    Personal history of other specified conditions 06/12/2018    History of dyspnea    Personal history of pneumonia (recurrent) 01/23/2014    History of pneumonia    Sprain of joints and ligaments of unspecified parts of neck, initial encounter 04/02/2013    Neck sprain    Zoster without complications 04/02/2013    Herpes zoster        Allergies   Allergen Reactions    Keflex [Cephalexin] Anxiety and Insomnia    Doxycycline Diarrhea        Past Surgical History:   Procedure Laterality Date    BREAST BIOPSY Right     benign    BUNIONECTOMY Left     CERVICAL DISCECTOMY  11/07/2013    Spinal Diskectomy Cervical    CERVICAL FUSION  09/12/2013    Cervical Vertebral Fusion    EYE SURGERY Bilateral 11/07/2013    Eye Surgery    KNEE ARTHROSCOPY W/ DEBRIDEMENT Right 11/07/2013    Arthroscopy Knee Right    OTHER SURGICAL HISTORY  08/16/2014    Throat Surgery to reove larynx cancer (several times)    OTHER SURGICAL HISTORY      occipital nerve stimulator to left upper chest    OTHER SURGICAL HISTORY      several cervical injections for pain    ROTATOR CUFF REPAIR Right  2013    Rotator Cuff Repair x2    SINUS SURGERY  2013    Sinus Surgery septoplasty x 2, nasal fracture x 2        Family History   Problem Relation Name Age of Onset    Cirrhosis Mother      Lung cancer Father          Social History     Socioeconomic History    Marital status:      Spouse name: Not on file    Number of children: Not on file    Years of education: Not on file    Highest education level: Not on file   Occupational History    Not on file   Tobacco Use    Smoking status: Former     Current packs/day: 0.00     Average packs/day: 1 pack/day for 20.0 years (20.0 ttl pk-yrs)     Types: Cigarettes     Start date:      Quit date:      Years since quittin.3     Passive exposure: Past    Smokeless tobacco: Never   Vaping Use    Vaping status: Never Used   Substance and Sexual Activity    Alcohol use: Yes     Alcohol/week: 2.0 standard drinks of alcohol     Types: 2 Glasses of wine per week    Drug use: Yes     Types: Marijuana     Comment: gummies nightly for sleep    Sexual activity: Defer   Other Topics Concern    Not on file   Social History Narrative    Not on file     Social Drivers of Health     Financial Resource Strain: Low Risk  (2025)    Overall Financial Resource Strain (CARDIA)     Difficulty of Paying Living Expenses: Not hard at all   Food Insecurity: No Food Insecurity (2025)    Hunger Vital Sign     Worried About Running Out of Food in the Last Year: Never true     Ran Out of Food in the Last Year: Never true   Transportation Needs: No Transportation Needs (2025)    PRAPARE - Transportation     Lack of Transportation (Medical): No     Lack of Transportation (Non-Medical): No   Physical Activity: Insufficiently Active (2023)    Exercise Vital Sign     Days of Exercise per Week: 7 days     Minutes of Exercise per Session: 20 min   Stress: No Stress Concern Present (2023)    [employer] - Occupational Stress  Questionnaire     Feeling of Stress : Not at all   Social Connections: Feeling Socially Integrated (1/9/2024)    OASIS : Social Isolation     Frequency of experiencing loneliness or isolation: Never   Recent Concern: Social Connections - Moderately Isolated (12/21/2023)    Social Connection and Isolation Panel [NHANES]     Frequency of Communication with Friends and Family: More than three times a week     Frequency of Social Gatherings with Friends and Family: More than three times a week     Attends [employer]ism Services: Never     Active Member of Clubs or Organizations: No     Attends Club or Organization Meetings: Never     Marital Status:    Intimate Partner Violence: Not At Risk (2/12/2025)    Humiliation, Afraid, Rape, and Kick questionnaire     Fear of Current or Ex-Partner: No     Emotionally Abused: No     Physically Abused: No     Sexually Abused: No   Housing Stability: Low Risk  (2/12/2025)    Housing Stability Vital Sign     Unable to Pay for Housing in the Last Year: No     Number of Times Moved in the Last Year: 0     Homeless in the Last Year: No        CURRENT MEDICATIONS:   Current Outpatient Medications   Medication Sig Dispense Refill    albuterol 2.5 mg /3 mL (0.083 %) nebulizer solution Take 3 mL (2.5 mg) by nebulization every 4 hours if needed.      albuterol 90 mcg/actuation inhaler Inhale 2 puffs every 4 hours if needed for wheezing. 18 g 3    APPLE CIDER VINEGAR ORAL Take 1 capsule by mouth once daily.      cetirizine (ZYRTEC) 10 mg capsule 1 capsule (10 mg) once daily.      cevimeline (Evoxac) 30 mg capsule Take 1 capsule (30 mg) by mouth 3 times a day. 270 capsule 3    cyclobenzaprine (Flexeril) 10 mg tablet Take 1 tablet (10 mg) by mouth 3 times a day as needed for muscle spasms. 90 tablet 1    diclofenac sodium (Voltaren) 1 % gel gel Apply 1 Application topically 4 times a day. 150 g 1    ELDERBERRY FRUIT ORAL Take 1 tablet by mouth once daily.      eletriptan (Relpax) 40 mg  "tablet TAKE 1 TABLET (40 MG) BY MOUTH 1 TIME IF NEEDED FOR MIGRAINE. 6 tablet 5    flurandrenolide (Cordran Tape Large Roll) 4 mcg/cm2 tape Apply topically once daily as needed.      fluticasone propion-salmeteroL (Advair Diskus) 500-50 mcg/dose diskus inhaler Inhale 1 puff 2 times a day.      ibuprofen 800 mg tablet TAKE 1 TABLET (800 MG) BY MOUTH 3 TIMES A DAY AS NEEDED FOR MILD PAIN (1 - 3) (PAIN). 90 tablet 5    ipratropium-albuteroL (Duo-Neb) 0.5-2.5 mg/3 mL nebulizer solution Take 3 mL by nebulization 3 times a day as needed for wheezing for up to 5 days. 45 mL 0    L. acidophilus/Bifid. animalis 15.5 billion cell capsule Take 1 capsule by mouth once daily.      lidocaine (Lidoderm) 5 % patch Apply 1 patch and leave in place for 12 hours, then remove and leave off for 12 hours. 30 patch 5    montelukast (Singulair) 10 mg tablet Take 1 tablet (10 mg) by mouth once daily at bedtime. 90 tablet 3    omeprazole (PriLOSEC) 20 mg DR capsule Take 1 capsule (20 mg) by mouth once daily in the morning. Take before meals. 90 capsule 3    PARoxetine (Paxil) 10 mg tablet Take 1 tablet (10 mg) by mouth once daily in the morning. 90 tablet 1    pregabalin (Lyrica) 75 mg capsule Take 1 capsule (75 mg) by mouth 2 times a day. 180 capsule 1    zolpidem (Ambien) 5 mg tablet Take 1 tablet (5 mg) by mouth as needed at bedtime for sleep. 30 tablet 5     No current facility-administered medications for this visit.        OBJECTIVE    PHYSICAL EXAM      2/11/2025    11:22 PM 2/12/2025    12:15 AM 2/12/2025     4:39 AM 2/12/2025     7:45 AM 2/12/2025    12:33 PM 2/12/2025     2:00 PM 4/23/2025    12:58 PM   Vitals   Systolic 128 123 131 133 110 136    Diastolic 60 71 80 86 98 69    BP Location   Right arm Right arm Right arm Right arm    Heart Rate 85 80 69 67 93     Temp   36.1 °C (97 °F) 36.1 °C (97 °F) 36 °C (96.8 °F)     Resp 20 19 18 18 17     Height       1.575 m (5' 2\")   Weight (lb)       130   BMI       23.78 kg/m2   BSA (m2)  "      1.61 m2   Visit Report Report      Report      There is no height or weight on file to calculate BMI.    GENERAL: A/Ox3, NAD. Appears healthy, well nourished  PSYCHIATRIC: Mood stable, appropriate memory recall  EYES: EOM intact, no scleral icterus  CARDIOVASCULAR: Palpable peripheral pulses  LUNGS: Breathing non-labored on room air  SKIN: no erythema, rashes, or ecchymoses     MUSCULOSKELETAL:  Laterality: bilateral Knee Exam  - Skin intact  - No erythema or warmth  - No ecchymosis or soft tissue swelling  - Alignment: neutral  - Palpation: Positive tenderness along the medial and lateral joint lines  - ROM: 0 - 0 - 120, patella mobility 1+ medial and lateral  - Effusion: Small  - Strength: knee extension and flexion 5/5, EHL/PF/DF motor intact  - Stability:        Anterior drawer stable       Posterior drawer stable       Varus/valgus stable       negative Lachman  - positive Aydee's  - Gait: Antalgic  - Hip Exam: flexion to 100+ degrees, full extension, internal/external rotation adequate, and no pain with log roll    NEUROVASCULAR:  - Neurovascular Status: sensation intact to light touch distally, lower extremity motor intact  - Capillary refill brisk at extremities, Bilateral dorsalis pedis pulse 2+    Imaging: Multiple views of the affected bilateral knee(s) demonstrate: Bilateral degenerative changes worse on the right.   X-rays were personally reviewed and interpreted by me.  Radiology reports were reviewed by me as well, if readily available at the time.    L Inj/Asp: bilateral knee on 5/5/2025 1:40 PM  Indications: pain  Details: 25 G needle, superolateral approach  Medications (Right): 40 mg triamcinolone acetonide 40 mg/mL; 2 mL lidocaine 10 mg/mL (1 %)  Medications (Left): 40 mg triamcinolone acetonide 40 mg/mL; 2 mL lidocaine 10 mg/mL (1 %)  Outcome: tolerated well, no immediate complications  Procedure, treatment alternatives, risks and benefits explained, specific risks discussed. Consent was  given by the patient. Immediately prior to procedure a time out was called to verify the correct patient, procedure, equipment, support staff and site/side marked as required. Patient was prepped and draped in the usual sterile fashion.               Leonard Sal MD  Attending Surgeon    Sports Medicine Orthopaedic Surgery  [facility] [employer]

## 2025-05-06 ENCOUNTER — APPOINTMENT (OUTPATIENT)
Dept: CARDIOLOGY | Facility: HOSPITAL | Age: 65
End: 2025-05-06
Payer: COMMERCIAL

## 2025-05-06 ENCOUNTER — ANESTHESIA (OUTPATIENT)
Dept: OPERATING ROOM | Facility: HOSPITAL | Age: 65
End: 2025-05-06
Payer: COMMERCIAL

## 2025-05-06 ENCOUNTER — HOSPITAL ENCOUNTER (OUTPATIENT)
Facility: HOSPITAL | Age: 65
Setting detail: OUTPATIENT SURGERY
Discharge: HOME | End: 2025-05-06
Attending: ORTHOPAEDIC SURGERY | Admitting: ORTHOPAEDIC SURGERY
Payer: COMMERCIAL

## 2025-05-06 ENCOUNTER — PHARMACY VISIT (OUTPATIENT)
Dept: PHARMACY | Facility: CLINIC | Age: 65
End: 2025-05-06
Payer: COMMERCIAL

## 2025-05-06 VITALS
RESPIRATION RATE: 20 BRPM | SYSTOLIC BLOOD PRESSURE: 144 MMHG | OXYGEN SATURATION: 94 % | HEIGHT: 62 IN | DIASTOLIC BLOOD PRESSURE: 78 MMHG | TEMPERATURE: 97.1 F | WEIGHT: 130 LBS | HEART RATE: 80 BPM | BODY MASS INDEX: 23.92 KG/M2

## 2025-05-06 DIAGNOSIS — M19.049 CMC DJD(CARPOMETACARPAL DEGENERATIVE JOINT DISEASE), LOCALIZED PRIMARY, UNSPECIFIED LATERALITY: Primary | ICD-10-CM

## 2025-05-06 LAB
ATRIAL RATE: 86 BPM
P AXIS: 57 DEGREES
PR INTERVAL: 174 MS
Q ONSET: 253 MS
QRS COUNT: 14 BEATS
QRS DURATION: 91 MS
QT INTERVAL: 368 MS
QTC CALCULATION(BAZETT): 446 MS
QTC FREDERICIA: 418 MS
R AXIS: 23 DEGREES
T AXIS: 47 DEGREES
T OFFSET: 437 MS
VENTRICULAR RATE: 88 BPM

## 2025-05-06 PROCEDURE — 2500000004 HC RX 250 GENERAL PHARMACY W/ HCPCS (ALT 636 FOR OP/ED): Mod: JZ | Performed by: LICENSED PRACTICAL NURSE

## 2025-05-06 PROCEDURE — 2500000005 HC RX 250 GENERAL PHARMACY W/O HCPCS: Performed by: NURSE ANESTHETIST, CERTIFIED REGISTERED

## 2025-05-06 PROCEDURE — 3600000003 HC OR TIME - INITIAL BASE CHARGE - PROCEDURE LEVEL THREE: Performed by: ORTHOPAEDIC SURGERY

## 2025-05-06 PROCEDURE — 7100000010 HC PHASE TWO TIME - EACH INCREMENTAL 1 MINUTE: Performed by: ORTHOPAEDIC SURGERY

## 2025-05-06 PROCEDURE — 2500000004 HC RX 250 GENERAL PHARMACY W/ HCPCS (ALT 636 FOR OP/ED): Performed by: NURSE ANESTHETIST, CERTIFIED REGISTERED

## 2025-05-06 PROCEDURE — RXMED WILLOW AMBULATORY MEDICATION CHARGE

## 2025-05-06 PROCEDURE — 20600 DRAIN/INJ JOINT/BURSA W/O US: CPT

## 2025-05-06 PROCEDURE — 25447 ARTHRP NTRCRP/CRP/MTCR NTRPS: CPT | Performed by: ORTHOPAEDIC SURGERY

## 2025-05-06 PROCEDURE — 3600000008 HC OR TIME - EACH INCREMENTAL 1 MINUTE - PROCEDURE LEVEL THREE: Performed by: ORTHOPAEDIC SURGERY

## 2025-05-06 PROCEDURE — 2500000001 HC RX 250 WO HCPCS SELF ADMINISTERED DRUGS (ALT 637 FOR MEDICARE OP): Performed by: ANESTHESIOLOGY

## 2025-05-06 PROCEDURE — 7100000009 HC PHASE TWO TIME - INITIAL BASE CHARGE: Performed by: ORTHOPAEDIC SURGERY

## 2025-05-06 PROCEDURE — 2500000004 HC RX 250 GENERAL PHARMACY W/ HCPCS (ALT 636 FOR OP/ED): Performed by: ORTHOPAEDIC SURGERY

## 2025-05-06 PROCEDURE — 26480 TRANSPLANT HAND TENDON: CPT | Performed by: ORTHOPAEDIC SURGERY

## 2025-05-06 PROCEDURE — 20600 DRAIN/INJ JOINT/BURSA W/O US: CPT | Performed by: ORTHOPAEDIC SURGERY

## 2025-05-06 PROCEDURE — 3700000001 HC GENERAL ANESTHESIA TIME - INITIAL BASE CHARGE: Performed by: ORTHOPAEDIC SURGERY

## 2025-05-06 PROCEDURE — 3700000002 HC GENERAL ANESTHESIA TIME - EACH INCREMENTAL 1 MINUTE: Performed by: ORTHOPAEDIC SURGERY

## 2025-05-06 PROCEDURE — 2500000004 HC RX 250 GENERAL PHARMACY W/ HCPCS (ALT 636 FOR OP/ED): Mod: JZ | Performed by: ANESTHESIOLOGY

## 2025-05-06 PROCEDURE — 93005 ELECTROCARDIOGRAM TRACING: CPT | Mod: 59

## 2025-05-06 PROCEDURE — 2720000007 HC OR 272 NO HCPCS: Performed by: ORTHOPAEDIC SURGERY

## 2025-05-06 RX ORDER — METOCLOPRAMIDE HYDROCHLORIDE 5 MG/ML
10 INJECTION INTRAMUSCULAR; INTRAVENOUS ONCE
Status: COMPLETED | OUTPATIENT
Start: 2025-05-06 | End: 2025-05-06

## 2025-05-06 RX ORDER — TRIAMCINOLONE ACETONIDE 40 MG/ML
INJECTION, SUSPENSION INTRA-ARTICULAR; INTRAMUSCULAR AS NEEDED
Status: DISCONTINUED | OUTPATIENT
Start: 2025-05-06 | End: 2025-05-06 | Stop reason: HOSPADM

## 2025-05-06 RX ORDER — SODIUM CHLORIDE, SODIUM LACTATE, POTASSIUM CHLORIDE, CALCIUM CHLORIDE 600; 310; 30; 20 MG/100ML; MG/100ML; MG/100ML; MG/100ML
20 INJECTION, SOLUTION INTRAVENOUS CONTINUOUS
Status: DISCONTINUED | OUTPATIENT
Start: 2025-05-06 | End: 2025-05-06 | Stop reason: HOSPADM

## 2025-05-06 RX ORDER — OXYCODONE AND ACETAMINOPHEN 5; 325 MG/1; MG/1
1 TABLET ORAL EVERY 6 HOURS PRN
Qty: 28 TABLET | Refills: 0 | Status: SHIPPED | OUTPATIENT
Start: 2025-05-06

## 2025-05-06 RX ORDER — FENTANYL CITRATE 50 UG/ML
INJECTION, SOLUTION INTRAMUSCULAR; INTRAVENOUS
Status: COMPLETED | OUTPATIENT
Start: 2025-05-06 | End: 2025-05-06

## 2025-05-06 RX ORDER — PROCHLORPERAZINE EDISYLATE 5 MG/ML
5 INJECTION INTRAMUSCULAR; INTRAVENOUS ONCE AS NEEDED
Status: DISCONTINUED | OUTPATIENT
Start: 2025-05-06 | End: 2025-05-06 | Stop reason: HOSPADM

## 2025-05-06 RX ORDER — BUPIVACAINE HYDROCHLORIDE 5 MG/ML
INJECTION, SOLUTION PERINEURAL AS NEEDED
Status: DISCONTINUED | OUTPATIENT
Start: 2025-05-06 | End: 2025-05-06 | Stop reason: HOSPADM

## 2025-05-06 RX ORDER — NALOXONE HYDROCHLORIDE 4 MG/.1ML
SPRAY NASAL
Qty: 2 EACH | Refills: 0 | OUTPATIENT
Start: 2025-05-06

## 2025-05-06 RX ORDER — PROPOFOL 10 MG/ML
INJECTION, EMULSION INTRAVENOUS AS NEEDED
Status: DISCONTINUED | OUTPATIENT
Start: 2025-05-06 | End: 2025-05-06

## 2025-05-06 RX ORDER — ROPIVACAINE HYDROCHLORIDE 5 MG/ML
INJECTION, SOLUTION EPIDURAL; INFILTRATION; PERINEURAL
Status: COMPLETED | OUTPATIENT
Start: 2025-05-06 | End: 2025-05-06

## 2025-05-06 RX ORDER — CLINDAMYCIN PHOSPHATE 600 MG/50ML
600 INJECTION, SOLUTION INTRAVENOUS ONCE
Status: COMPLETED | OUTPATIENT
Start: 2025-05-06 | End: 2025-05-06

## 2025-05-06 RX ORDER — SODIUM CITRATE AND CITRIC ACID MONOHYDRATE 334; 500 MG/5ML; MG/5ML
30 SOLUTION ORAL ONCE
Status: COMPLETED | OUTPATIENT
Start: 2025-05-06 | End: 2025-05-06

## 2025-05-06 RX ORDER — TRANEXAMIC ACID 100 MG/ML
INJECTION, SOLUTION INTRAVENOUS AS NEEDED
Status: DISCONTINUED | OUTPATIENT
Start: 2025-05-06 | End: 2025-05-06

## 2025-05-06 RX ORDER — MIDAZOLAM HYDROCHLORIDE 1 MG/ML
INJECTION INTRAMUSCULAR; INTRAVENOUS
Status: COMPLETED | OUTPATIENT
Start: 2025-05-06 | End: 2025-05-06

## 2025-05-06 RX ORDER — LIDOCAINE HCL/PF 100 MG/5ML
SYRINGE (ML) INTRAVENOUS AS NEEDED
Status: DISCONTINUED | OUTPATIENT
Start: 2025-05-06 | End: 2025-05-06

## 2025-05-06 RX ORDER — HYDROMORPHONE HYDROCHLORIDE 0.2 MG/ML
0.2 INJECTION INTRAMUSCULAR; INTRAVENOUS; SUBCUTANEOUS EVERY 5 MIN PRN
Status: DISCONTINUED | OUTPATIENT
Start: 2025-05-06 | End: 2025-05-06 | Stop reason: HOSPADM

## 2025-05-06 RX ORDER — SCOPOLAMINE 1 MG/3D
1 PATCH, EXTENDED RELEASE TRANSDERMAL ONCE
Status: DISCONTINUED | OUTPATIENT
Start: 2025-05-06 | End: 2025-05-06 | Stop reason: HOSPADM

## 2025-05-06 RX ORDER — ONDANSETRON HYDROCHLORIDE 2 MG/ML
4 INJECTION, SOLUTION INTRAVENOUS ONCE
Status: COMPLETED | OUTPATIENT
Start: 2025-05-06 | End: 2025-05-06

## 2025-05-06 RX ORDER — IPRATROPIUM BROMIDE AND ALBUTEROL SULFATE 2.5; .5 MG/3ML; MG/3ML
3 SOLUTION RESPIRATORY (INHALATION) ONCE
Status: DISCONTINUED | OUTPATIENT
Start: 2025-05-06 | End: 2025-05-06 | Stop reason: HOSPADM

## 2025-05-06 RX ORDER — ONDANSETRON HYDROCHLORIDE 2 MG/ML
4 INJECTION, SOLUTION INTRAVENOUS ONCE AS NEEDED
Status: DISCONTINUED | OUTPATIENT
Start: 2025-05-06 | End: 2025-05-06 | Stop reason: HOSPADM

## 2025-05-06 RX ORDER — FAMOTIDINE 10 MG/ML
20 INJECTION, SOLUTION INTRAVENOUS ONCE
Status: COMPLETED | OUTPATIENT
Start: 2025-05-06 | End: 2025-05-06

## 2025-05-06 RX ORDER — LIDOCAINE HYDROCHLORIDE AND EPINEPHRINE 10; 10 UG/ML; MG/ML
INJECTION, SOLUTION INFILTRATION; PERINEURAL AS NEEDED
Status: DISCONTINUED | OUTPATIENT
Start: 2025-05-06 | End: 2025-05-06

## 2025-05-06 RX ADMIN — FAMOTIDINE 20 MG: 10 INJECTION, SOLUTION INTRAVENOUS at 10:01

## 2025-05-06 RX ADMIN — SODIUM CHLORIDE, POTASSIUM CHLORIDE, SODIUM LACTATE AND CALCIUM CHLORIDE 20 ML/HR: 600; 310; 30; 20 INJECTION, SOLUTION INTRAVENOUS at 10:16

## 2025-05-06 RX ADMIN — LIDOCAINE HYDROCHLORIDE,EPINEPHRINE BITARTRATE 5 ML: 10; .01 INJECTION, SOLUTION INFILTRATION; PERINEURAL at 10:51

## 2025-05-06 RX ADMIN — DEXAMETHASONE SODIUM PHOSPHATE 8 MG: 4 INJECTION, SOLUTION INTRAMUSCULAR; INTRAVENOUS at 10:01

## 2025-05-06 RX ADMIN — METOCLOPRAMIDE 10 MG: 5 INJECTION, SOLUTION INTRAMUSCULAR; INTRAVENOUS at 10:02

## 2025-05-06 RX ADMIN — ROPIVACAINE HYDROCHLORIDE 15 ML: 5 INJECTION, SOLUTION EPIDURAL; INFILTRATION; PERINEURAL at 10:51

## 2025-05-06 RX ADMIN — SODIUM CITRATE AND CITRIC ACID MONOHYDRATE 30 ML: 500; 334 SOLUTION ORAL at 10:02

## 2025-05-06 RX ADMIN — ONDANSETRON 4 MG: 2 INJECTION INTRAMUSCULAR; INTRAVENOUS at 10:02

## 2025-05-06 RX ADMIN — PROPOFOL 200 MG: 10 INJECTION, EMULSION INTRAVENOUS at 12:21

## 2025-05-06 RX ADMIN — FENTANYL CITRATE 100 MCG: 50 INJECTION INTRAMUSCULAR; INTRAVENOUS at 10:45

## 2025-05-06 RX ADMIN — CLINDAMYCIN PHOSPHATE 600 MG: 600 INJECTION, SOLUTION INTRAVENOUS at 12:13

## 2025-05-06 RX ADMIN — SCOPOLAMINE 1 PATCH: 1.5 PATCH, EXTENDED RELEASE TRANSDERMAL at 10:02

## 2025-05-06 RX ADMIN — PROPOFOL 150 MCG/KG/MIN: 10 INJECTION, EMULSION INTRAVENOUS at 12:25

## 2025-05-06 RX ADMIN — TRANEXAMIC ACID 1000 MG: 100 INJECTION, SOLUTION INTRAVENOUS at 12:24

## 2025-05-06 RX ADMIN — LIDOCAINE HYDROCHLORIDE 60 MG: 20 INJECTION, SOLUTION INTRAVENOUS at 12:23

## 2025-05-06 RX ADMIN — MIDAZOLAM HYDROCHLORIDE 2 MG: 1 INJECTION, SOLUTION INTRAMUSCULAR; INTRAVENOUS at 10:45

## 2025-05-06 RX ADMIN — DEXAMETHASONE SODIUM PHOSPHATE 4 MG: 4 INJECTION INTRA-ARTICULAR; INTRALESIONAL; INTRAMUSCULAR; INTRAVENOUS; SOFT TISSUE at 10:51

## 2025-05-06 SDOH — HEALTH STABILITY: MENTAL HEALTH: CURRENT SMOKER: 0

## 2025-05-06 ASSESSMENT — PAIN SCALES - GENERAL
PAINLEVEL_OUTOF10: 9
PAINLEVEL_OUTOF10: 0 - NO PAIN
PAINLEVEL_OUTOF10: 4
PAINLEVEL_OUTOF10: 0 - NO PAIN

## 2025-05-06 ASSESSMENT — COLUMBIA-SUICIDE SEVERITY RATING SCALE - C-SSRS
1. IN THE PAST MONTH, HAVE YOU WISHED YOU WERE DEAD OR WISHED YOU COULD GO TO SLEEP AND NOT WAKE UP?: NO
2. HAVE YOU ACTUALLY HAD ANY THOUGHTS OF KILLING YOURSELF?: NO

## 2025-05-06 ASSESSMENT — PAIN DESCRIPTION - DESCRIPTORS
DESCRIPTORS: ACHING;SORE
DESCRIPTORS: ACHING

## 2025-05-06 ASSESSMENT — PAIN - FUNCTIONAL ASSESSMENT
PAIN_FUNCTIONAL_ASSESSMENT: 0-10

## 2025-05-06 NOTE — DISCHARGE INSTRUCTIONS
[facility]  [phone]   Fax: [phone] [address]    [facility] -  [address] [place], [place] [id number] [place] [id number]    Upper Extremity   Post-Operatively (After Surgery) - Thumb Arthritis Surgery    1. Post-Operative Course    Following surgery, your surgeon will see your family in the family call room. Once stable, and in the Post Anesthesia Care Unit (PACU), you will be transported to your hospital room or allowed to go home if an outpatient procedure.     2. Dressing    You have a splint (hard casting material), do NOT remove the dressing until follow up. DO NOT GET DRESSING WET! Once at home, if your dressing, splint, or cast feels too tight or mistakenly gets wet, please contact the office. Further dressing instructions may be given at surgery.     If your sutures are visible (black strings), they will be removed about 10-14 days after surgery.  If you do not see any sutures, then they are absorbable and will not need to be removed.  In either case, you should have an appointment to see your physician 10-14 after surgery.    3. Activity     Most people underestimate the length of time required to fully recover from surgery. If you had a block (where your arm feels numb), the sensation typically returns around 18-24 hours later.  It is recommended you take some pain medication the evening following your surgery so when the block wears off (in the middle of the night), you are not in severe pain.   With pain medication, start at the lowest dose that controls your pain.       After the first 1-3 days, we encourage you to balance your activity between ambulation, sitting in a chair,   and resting in bed with your arm elevated. Let swelling and pain be your guide to activity, you should   avoid prolonged (over 20 minutes) standing or sitting. In general, limit your activities to home for the first   1-3 days.    4. Pain    You will be discharged  to home with a prescription for oral pain medication. A most important factor in pain control is rest and elevation. Ice may also be applied to the operative site for 30 minute intervals. Please use a waterproof bag for ice or cold packs.  Again, as you feel the numbness resolving and some onset of discomfort, please take pain medication, don't wait till full resolution of block.   Try to use the lowest dose of pain medication that controls your pain.      The pain medication may cause constipation. Drink plenty of fluids, eat fruits and vegetables. You may use an over the counter laxative or stool softener if necessary. Take pain medication with some food in your stomach, as prescribed by your pharmacist.     5. Elevation   Elevation of the operative extremity is critical. Elevation reduces swelling and minimizes pain. Less swelling is associated with a lower infectious rate, fewer wound complications, less post-operative stiffness, and more rapid recovery of function. To keep the swelling down, your hand must be kept above the level of your heart.

## 2025-05-06 NOTE — ANESTHESIA PROCEDURE NOTES
Peripheral Block    Patient location during procedure: pre-op  Medication administered at: 5/6/2025 10:51 AM  End time: 5/6/2025 10:55 AM  Reason for block: at surgeon's request and post-op pain management  Staffing  Performed: CRNA   Authorized by: YEN Ahn    Performed by: YEN Ahn  Preanesthetic Checklist  Completed: patient identified, IV checked, site marked, risks and benefits discussed, surgical consent, monitors and equipment checked, pre-op evaluation and timeout performed   Timeout performed at: 5/6/2025 10:45 AM  Peripheral Block  Patient position: sitting  Prep: ChloraPrep  Patient monitoring: heart rate, cardiac monitor and continuous pulse ox  Block type: interscalene  Laterality: right  Injection technique: single-shot  Guidance: nerve stimulator and ultrasound guided  Local infiltration: ropivacaine  Infiltration strength: 0.5 %  Dose: 15 mL  Needle  Needle gauge: 20 G  Needle localization: nerve stimulator and ultrasound guidance  Assessment  Injection assessment: negative aspiration for heme, no paresthesia on injection, incremental injection and local visualized surrounding nerve on ultrasound  Paresthesia pain: none  Heart rate change: no  Slow fractionated injection: yes  Additional Notes  Anesthesia consult was placed by Dr. Bennett for post procedural analgesia.  The patient's chart was reviewed and they were deemed an appropriate candidate for the procedure. The patient was educated in detail on the risks, benefits, and alternatives to the block including but not limited to: temporary or permanent nerve damage, bleeding, infection, damage to surrounding tissues, possible block failure and the potential for local anesthesia toxicity syndrome.  The patient expressed understanding and all questions were answered prior to the initiation of the procedure.  Informed consent was also signed by the patient and laterality determined per institutional policy.  A  "formal \"time out \"consistent with the institutions rules and regulations was performed by the anesthesia provider and appropriate RN.     Procedure  The patient was placed in the sitting position. The RIGHT side was marked and skin prep applied and allowed to dry. Proper monitors were applied with oxygen.  Sedation was provided by administering     Versed 2 mg IV  Fentanyl 100 mcg IV    A high frequency linear ultrasound probe with probe cover and sterile coupling gel was applied over the anterior neck and C-5/6/7 roots tightly located. The fascial planes between the anterior and middle scalene muscles as well as the great vessels of the neck were also identified. The probe was then positioned for a short axis view structural view, allowing for exclusion of any nearby vessels,and doppler mode was engaged to assist in this identification.   A 20G stimuplex needle was then advanced maintaining an in-plane visualization throughout the procedure, under ultrasound guidance from lateral to medial to come to rest just deep to the neurovascular sheath, but avoiding contact of the brachial plexus. A motor response was visualized from the nerve stimulator, loss of response was seen at 0.5 mAmp.  Upon negative aspiration, 15 ml 0.5% Ropivacaine with 4 mg decadron preservative free was administered safely and cautiously between the muscle planes.  Aspiration every 3-5mls was done to avoid potential intravascular injection.  All injections were done without resistance and were free of blood/ CSF.  The patient tolerated the procedure well without report of intense pain, tinnitus, metallic taste, or circumoral numbness.  The block was then evaluated after a few minutes and appeared to be functioning appropriately.        Medications Administered  dexAMETHasone (Decadron) injection - perineural injection   4 mg - 5/6/2025 10:51:00 AM  midazolam (VERSED) IV - intravenous   2 mg - 5/6/2025 10:45:00 AM  fentaNYL (SUBLIMAZE) IV - " intravenous   100 mcg - 5/6/2025 10:45:00 AM  ropivacaine (NAROPIN) 5 MG/ML Perineural - perineural injection   15 mL - 5/6/2025 10:51:00 AM

## 2025-05-06 NOTE — OP NOTE
ORTHOPEDIC OPERATIVE NOTE    Name: Radha Puri  : 1960  Surgeon: Marco Bennett DO  Facility: [facility]  Date of Surgery: 25     SURGEON:     Marco Bennett DO  PRE OP DIAGNOSIS:                    Bilateral thumb carpometacarpal osteoarthritis  POST OP DIAGNOSIS:  Same  PROCEDURE:   Right thumb carpometacarpal arthroplasty with FCR tendon transfer suspension plasty, left thumb CMC steroid injection    ANESTHESIA:  Regional Sedation  ASSISTANT:   Val Evans MS  BLOOD LOSS: Minimal    INDICATIONS FOR PROCEDURE: The patient is a 64 y.o. -year-old female who has failed nonsurgical management for osteoarthritis of the left thumb CMC joint including corticosteroid injections. He has been fully informed of the risks and benefits of the procedure and elected to undergo the surgery.    PROCEDURE IN DETAIL: The patient was seen and consented preoperatively with the side and site of surgery appropriately marked. The patient was taken back to operative suite, placed supine on the operative table, and placed on monitor for the duration of the case.  The patient was administered sedation and monitored throughout the entire surgery by Department of Anesthesia. While sedated, the right upper extremity was sterilely prepped and draped in the sterile orthopedic fashion, elevated with an Esmarch, tourniquet inflated to 250 mmHg for duration of case. A time-out was performed confirming the site of surgery and surgery to be performed.    Pfannenstiel incision was made to the radial aspect of the thumb over the CMC joint. Skin and underlying tissues were sharply dissected down. Hemostasis maintained with bipolar  electrocauterization. Branches of superficial radial nerve isolated and protected throughout the approach. Radial artery was then freed up with and perforating branches coagulated with electrocautery. The capsulectomy was carried out dorsally and volarly to evaluate the CMC joint. Significant  osteoarthritis present at the joint. The trapezium was then transected and removed in its entirety, confirmed visually as well as palpably.    The base of the thumb metacarpal was then removed using a sagittal saw, taking off a wafer of bone. A radial sided kimberli hole was created with a kimberli an the base of the metacarpal and another through the direct proximal base of the metacarpal centrally located.    Two separate 1-cm transverse incision were made at the forearm proximally over the FCR using a scalpel, skin and underlying tissues were sharply dissected down. Half of the FCR was then transected and brought out through the distal incision and was placed through the base of the first metacarpal through the above kimberli holes and woven into itself with suspension plasty using 3-0 supramid. The remaining tendon was then woven as an anchovy and sutured to the volar capsule as an interposition using 3-0 supramid. This resulted in good position of thumb. No evidence of impingement. The STT joint had been evaluated. No evidence of osteoarthritis was present.    Under sterile technique I injected 1 cc lidocaine and 40 mg Kenalog in the left thumb CMC joint.  Band-Aid placed    The wound was irrigated with sterile saline. The capsule was closed with 3-0 Supramid. Deep tissue closed with 4-0 vicryl.  The skin was then closed with 5-0 nylon suture. Sterile dressing was placed of Xeroform and 4x4s, affixed with Kerlix. The patient was placed in well-padded thumb spica splint. Tourniquet was deflated. Adequate perfusion returned to the hand.  The patient was taken to PACU in satisfactory condition.    Electronically signed  Marco Bennett DO    p/w chronic constipation, ileus  could be in the setting of post-op anesthesia vs. opioid induced constipation  Abd XR (9/23) as outpatient showed generalized ileus  CT A/P  NPO for now  start bowel regimen (miralax and senna)  give enema STAT  If no BM can consider naloxegol  avoid opioids for pain regimen  Surgery consulted - P/w chronic constipation and ileus  - Possibly in the setting of post-op anesthesia vs. opioid induced constipation  - Abd XR (9/23) as outpatient showed generalized ileus  - S/p CT A/P  - C/w NPO for now  - S/p enema   - RN reported large BM.  C/w bowel regimen (miralax and senna)  - Continue to avoid opioids for pain regimen  - Started clear liquid diet.  Continue to advance as tolerated

## 2025-05-08 RX ORDER — LIDOCAINE HYDROCHLORIDE 10 MG/ML
2 INJECTION, SOLUTION INFILTRATION; PERINEURAL
Status: COMPLETED | OUTPATIENT
Start: 2025-05-05 | End: 2025-05-05

## 2025-05-08 RX ORDER — TRIAMCINOLONE ACETONIDE 40 MG/ML
40 INJECTION, SUSPENSION INTRA-ARTICULAR; INTRAMUSCULAR
Status: COMPLETED | OUTPATIENT
Start: 2025-05-05 | End: 2025-05-05

## 2025-05-21 ENCOUNTER — APPOINTMENT (OUTPATIENT)
Dept: OCCUPATIONAL THERAPY | Facility: CLINIC | Age: 65
End: 2025-05-21
Payer: COMMERCIAL

## 2025-05-21 ENCOUNTER — APPOINTMENT (OUTPATIENT)
Dept: ORTHOPEDIC SURGERY | Facility: CLINIC | Age: 65
End: 2025-05-21
Payer: COMMERCIAL

## 2025-05-21 VITALS — WEIGHT: 127 LBS | BODY MASS INDEX: 22.5 KG/M2 | HEIGHT: 63 IN

## 2025-05-21 DIAGNOSIS — M19.031 CMC DJD(CARPOMETACARPAL DEGENERATIVE JOINT DISEASE), LOCALIZED PRIMARY, RIGHT: ICD-10-CM

## 2025-05-21 DIAGNOSIS — M19.032 CMC DJD(CARPOMETACARPAL DEGENERATIVE JOINT DISEASE), LOCALIZED PRIMARY, LEFT: Primary | ICD-10-CM

## 2025-05-21 PROCEDURE — 1160F RVW MEDS BY RX/DR IN RCRD: CPT | Performed by: ORTHOPAEDIC SURGERY

## 2025-05-21 PROCEDURE — L3809 WHFO W/O JOINTS PRE OTS: HCPCS | Performed by: ORTHOPAEDIC SURGERY

## 2025-05-21 PROCEDURE — 1159F MED LIST DOCD IN RCRD: CPT | Performed by: ORTHOPAEDIC SURGERY

## 2025-05-21 PROCEDURE — 1036F TOBACCO NON-USER: CPT | Performed by: ORTHOPAEDIC SURGERY

## 2025-05-21 PROCEDURE — 1125F AMNT PAIN NOTED PAIN PRSNT: CPT | Performed by: ORTHOPAEDIC SURGERY

## 2025-05-21 PROCEDURE — 99024 POSTOP FOLLOW-UP VISIT: CPT | Performed by: ORTHOPAEDIC SURGERY

## 2025-05-21 PROCEDURE — 3008F BODY MASS INDEX DOCD: CPT | Performed by: ORTHOPAEDIC SURGERY

## 2025-05-21 ASSESSMENT — PAIN SCALES - GENERAL: PAINLEVEL_OUTOF10: 3

## 2025-05-21 ASSESSMENT — PAIN - FUNCTIONAL ASSESSMENT: PAIN_FUNCTIONAL_ASSESSMENT: 0-10

## 2025-05-21 NOTE — PROGRESS NOTES
2 weeks postop s/p right thumb carpal metacarpal arthroplasty and left thumb CMC CSI done 5/6/2025. Pain is well controlled with the current treatment plan. Denies numbness/tingling. Overall, they are doing well. They have no concerns. Sutures removed ands Steris applied.

## 2025-05-21 NOTE — PROGRESS NOTES
64 y.o. female presents today for for follow up after right thumb CMC arthroplasty with left thumb steroid injection CMC. The patient reports doing well, symptoms much improved. The DOS was 2 weeks ago. Pain is controlled. Patient denies numbness and tingling.  Splint has been worn as directed.       Review of Systems    Constitutional: see HPI, no fever, no chills, not feeling tired, no significant weight gain or weight loss.   Eyes: No vision changes  ENT: no nosebleeds.   Cardiovascular: no chest pain.   Respiratory: no shortness of breath and no cough.   Gastrointestinal: no abdominal pain, no nausea, no vomiting and no diarrhea.   Musculoskeletal: per HPI  Neurological: no headache, no gait disturbances  Psychiatric: no depression and no sleep disturbances.   Endocrine: no muscle weakness and no muscle cramps.   Hematologic/Lymphatic: no swollen glands and no tendency for easy bruising or excessive swelling.     Patient's past medical history, past surgical history, allergies, and medications have been reviewed unless otherwise noted in the chart.     Post-Op Exam  Inspection:  no evidence of infection, no erythema, Palpation:  compartments are soft, Range of Motion:  not tested, Stability:  not tested, Strength:  not tested, Skin:  incision site clean, dry and intact, healing without complication, Vascular:  capillary refill <2 seconds distally, Sensation:  intact to light touch distally.    Constitutional   General appearance: Alert and in no acute distress. Well developed, well nourished.    Eyes   External Eye, Conjunctiva and lids: Normal external exam - pupils were equal in size, round, reactive to light (PERRL) with normal accommodation and extraocular movements intact (EOMI).   Ears, Nose, Mouth, and Throat   Hearing: Normal.   Neck   Neck: No neck mass was observed. Supple.   Pulmonary   Respiratory effort: No respiratory distress.   Cardiovascular   Examination of extremities: No peripheral edema.    Psychiatric   Judgment and insight: Intact.   Orientation to person, place, and time: Alert and oriented x 3.       Mood and affect: Normal.      2 weeks status post right thumb CMC arthroplasty and left thumb CMC steroid injection  Based on the history, physical exam and imaging studies above, the patient's presentation is consistent with the above diagnosis.  I had a long discussion with the patient regarding their presentation and the treatment options.    We again discussed her treatment options going forward along with their associated risks and benefits. After thorough discussion, the patient has elected to proceed with postoperative care. All questions were answered to the patients satisfaction who seems satisfied with the plan.  They will call the office with any questions/concerns.     Sutures removed  Steris  Vitamin E cream prn to scar tissue  Occupational Therapy evaluation and treatment, forearm-based thumb spica  Follow-up 4 weeks no x-ray

## 2025-05-27 DIAGNOSIS — M19.032 CMC DJD(CARPOMETACARPAL DEGENERATIVE JOINT DISEASE), LOCALIZED PRIMARY, LEFT: ICD-10-CM

## 2025-05-27 DIAGNOSIS — M19.049 CMC DJD(CARPOMETACARPAL DEGENERATIVE JOINT DISEASE), LOCALIZED PRIMARY, UNSPECIFIED LATERALITY: ICD-10-CM

## 2025-05-27 DIAGNOSIS — M19.031 CMC DJD(CARPOMETACARPAL DEGENERATIVE JOINT DISEASE), LOCALIZED PRIMARY, RIGHT: ICD-10-CM

## 2025-06-04 ENCOUNTER — APPOINTMENT (OUTPATIENT)
Dept: OCCUPATIONAL THERAPY | Facility: CLINIC | Age: 65
End: 2025-06-04
Payer: COMMERCIAL

## 2025-06-25 ENCOUNTER — APPOINTMENT (OUTPATIENT)
Dept: ORTHOPEDIC SURGERY | Facility: CLINIC | Age: 65
End: 2025-06-25
Payer: COMMERCIAL

## 2025-06-25 VITALS — WEIGHT: 127 LBS | BODY MASS INDEX: 22.5 KG/M2 | HEIGHT: 63 IN

## 2025-06-25 DIAGNOSIS — M19.031 CMC DJD(CARPOMETACARPAL DEGENERATIVE JOINT DISEASE), LOCALIZED PRIMARY, RIGHT: Primary | ICD-10-CM

## 2025-06-25 PROCEDURE — 99024 POSTOP FOLLOW-UP VISIT: CPT | Performed by: ORTHOPAEDIC SURGERY

## 2025-06-25 PROCEDURE — 3008F BODY MASS INDEX DOCD: CPT | Performed by: ORTHOPAEDIC SURGERY

## 2025-06-25 PROCEDURE — 1159F MED LIST DOCD IN RCRD: CPT | Performed by: ORTHOPAEDIC SURGERY

## 2025-06-25 PROCEDURE — 1160F RVW MEDS BY RX/DR IN RCRD: CPT | Performed by: ORTHOPAEDIC SURGERY

## 2025-06-25 PROCEDURE — 1036F TOBACCO NON-USER: CPT | Performed by: ORTHOPAEDIC SURGERY

## 2025-06-25 ASSESSMENT — ENCOUNTER SYMPTOMS
DEPRESSION: 0
LOSS OF SENSATION IN FEET: 0
OCCASIONAL FEELINGS OF UNSTEADINESS: 0

## 2025-06-25 ASSESSMENT — PAIN - FUNCTIONAL ASSESSMENT: PAIN_FUNCTIONAL_ASSESSMENT: NO/DENIES PAIN

## 2025-06-25 NOTE — PROGRESS NOTES
7+ weeks postop s/p right thumb carpal metacarpal arthroplasty and left thumb CMC CSI done 5/6/2025. Pain is well controlled with the current treatment plan. They went occupational therapy for their forearm based thumb spica and wore that as directed. Denies numbness/tingling. Overall, they are doing well. They have no concerns.

## 2025-06-25 NOTE — PROGRESS NOTES
64 y.o. female presents today for for follow up after right thumb CMC arthroplasty with left thumb steroid injection CMC. The patient reports doing well, symptoms much improved. The DOS was 6 weeks ago. Pain is controlled. Patient denies numbness and tingling.  Splint has been worn as directed.   Been going to therapy, symptoms much improved.    Review of Systems    Constitutional: see HPI, no fever, no chills, not feeling tired, no significant weight gain or weight loss.   Eyes: No vision changes  ENT: no nosebleeds.   Cardiovascular: no chest pain.   Respiratory: no shortness of breath and no cough.   Gastrointestinal: no abdominal pain, no nausea, no vomiting and no diarrhea.   Musculoskeletal: per HPI  Neurological: no headache, no gait disturbances  Psychiatric: no depression and no sleep disturbances.   Endocrine: no muscle weakness and no muscle cramps.   Hematologic/Lymphatic: no swollen glands and no tendency for easy bruising or excessive swelling.     Patient's past medical history, past surgical history, allergies, and medications have been reviewed unless otherwise noted in the chart.     Hand/Wrist Post-Op Exam  Inspection:  no evidence of infection, no erythema, Palpation:  compartments are soft, Range of Motion:  F/E 80/80, S/P 90/90 Finger ROM Full extension, full flexion Stability:  stable Strength:  5/5 F/E, 5/5 Adduction/Abduction 5/5 Opposition to small finger Skin:  incision site clean, dry and intact, healing without complication, Vascular:  capillary refill <2 seconds distally, Sensation:  intact to light touch distally.    Constitutional   General appearance: Alert and in no acute distress. Well developed, well nourished.    Eyes   External Eye, Conjunctiva and lids: Normal external exam - pupils were equal in size, round, reactive to light (PERRL) with normal accommodation and extraocular movements intact (EOMI).   Ears, Nose, Mouth, and Throat   Hearing: Normal.   Neck   Neck: No neck mass  was observed. Supple.   Pulmonary   Respiratory effort: No respiratory distress.   Cardiovascular   Examination of extremities: No peripheral edema.   Psychiatric   Judgment and insight: Intact.   Orientation to person, place, and time: Alert and oriented x 3.       Mood and affect: Normal.      6 weeks status post right thumb CMC arthroplasty and left thumb CMC steroid injection  Based on the history, physical exam and imaging studies above, the patient's presentation is consistent with the above diagnosis.  I had a long discussion with the patient regarding their presentation and the treatment options.    We again discussed her treatment options going forward along with their associated risks and benefits. After thorough discussion, the patient has elected to proceed with postoperative care. All questions were answered to the patients satisfaction who seems satisfied with the plan.  They will call the office with any questions/concerns.     Vitamin E cream prn to scar tissue  Occupational Therapy evaluation and treatment, ahdn-based thumb spica  Follow-up 6 weeks no x-ray

## 2025-07-20 ENCOUNTER — APPOINTMENT (OUTPATIENT)
Dept: URGENT CARE | Age: 65
End: 2025-07-20
Payer: COMMERCIAL

## 2025-07-20 ENCOUNTER — OFFICE VISIT (OUTPATIENT)
Dept: URGENT CARE | Age: 65
End: 2025-07-20
Payer: COMMERCIAL

## 2025-07-20 DIAGNOSIS — L03.011 CELLULITIS OF RIGHT RING FINGER: Primary | ICD-10-CM

## 2025-07-20 RX ORDER — SULFAMETHOXAZOLE AND TRIMETHOPRIM 800; 160 MG/1; MG/1
1 TABLET ORAL 2 TIMES DAILY
Qty: 10 TABLET | Refills: 0 | Status: SHIPPED | OUTPATIENT
Start: 2025-07-20 | End: 2025-07-30

## 2025-07-20 NOTE — PROGRESS NOTES
Urgent Care Virtual Video Visit    Patient Location: [place], [place]  Provider Location: [facility]      HPI :   Patient presents on virtual visit for redness and rash to right ring finger. She explains that she thought she had something small in her finger, almost like a stinger, removed it and washed the area. Then the area blistered and she popped the area and since it has gotten red and swollen. Denies limitation to rom but claims pain, denies circumferential swelling or any red streaking into hand. Claims some drainage from area. Denies fever, chills, sweats. Denies n/v/d. Denies chest pain, sob.       PHYSICAL EXAM :  Limited secondary to nature of virtual visit  CONSTITUTIONAL: non-toxic appearing, no evidence of diaphoresis   HEENT: EOMi bilaterally  NECK: normal ROM  CV: appears well perfused, non-cyanotic  RESPIRATORY: no apparent respiratory distress, no evidence of accessory muscle use, no nasal flaring, no tripoding  SKIN: erythema, to proximal aspect of palmar surface of right ring finger. No circumferential swelling, rom intact.    PSYCH: pleasant      VITALS : Unable to complete vitals secondary to nature of virtual visit  Pulse Ox: unavailable  BP: blood pressure cuff unavailable   Pulse: unavailable   RR: unavailable   Temp: unavailable      ASSESMENT AND PLAN :    MDM- Signs and symptoms consistent with cellulitis. No suspicion for sepsis, abscess, or other complications. Plan is for antibiotic therapy and symptomatic support at home. Plan to follow with PCP. Patient advised to return to clinic or go to the ED if symptoms change or worsen. Patient verbalized understanding and agrees with plan.     Patient plans to use our in house pharmacy and will  her prescription from [facility] today.         I have communicated my name and active licensure. The patient's identity and physical location were verified at the time of this visit. Either the patient or their legal representative has  been informed of the risks and benefit of treatment through a remote evaluation vs in person visit and consented to proceed with the evaluation remotely. This visit was conducted using video and audio.      I have communicated my name and active licensure. Video visit completed with realtime synchronous video/audio connection. Informed consent was obtained from the patient. Patient was made aware that my evaluation and diagnosis are limited due to the fact that we are not in the same room during the interview and that this is a virtual encounter that took place via videoconferencing. Patient verbalized understanding.     Patient disposition: Home    Electronically signed by Gali Mejia PA-C  5:12 PM

## 2025-07-20 NOTE — PATIENT INSTRUCTIONS
Keep area clean with soap and water and dry, keep open or cover with simple bandaid if needed    Start bactrim as antibiotic  Take 1 tablet by mouth 2 times a day for 10 days.     Go to emergency room  if any numbness, tingling, joint pain, fever.

## 2025-07-21 ENCOUNTER — OFFICE VISIT (OUTPATIENT)
Dept: URGENT CARE | Age: 65
End: 2025-07-21
Payer: COMMERCIAL

## 2025-07-21 DIAGNOSIS — L08.9 FINGER INFECTION: Primary | ICD-10-CM

## 2025-07-21 RX ORDER — MUPIROCIN 20 MG/G
OINTMENT TOPICAL 2 TIMES DAILY
Qty: 22 G | Refills: 0 | Status: SHIPPED | OUTPATIENT
Start: 2025-07-21 | End: 2025-07-28

## 2025-07-21 ASSESSMENT — ENCOUNTER SYMPTOMS
CONSTITUTIONAL NEGATIVE: 1
WOUND: 1

## 2025-07-21 NOTE — PROGRESS NOTES
"Subjective   Patient ID: Radha Puri \"Jefe" is a 64 y.o. female. They present today with a chief complaint of No chief complaint on file..    History of Present Illness  64-year-old female presents clinic today with complaints of a finger infection.  Patient states that she originally had a possible foreign body or bite wound to the area.  She states is progressively gotten red, swollen and blistered.  She was evaluated here yesterday and had the wound slightly debrided as well as being placed on Bactrim.  She states the redness has not worsened but the skin is continue to peel.          Past Medical History  Allergies as of 07/21/2025 - Reviewed 06/25/2025   Allergen Reaction Noted    Keflex [cephalexin] Anxiety and Insomnia 02/20/2024    Doxycycline Diarrhea 05/02/2023       Prescriptions Prior to Admission[1]     Medical History[2]    Surgical History[3]     reports that she quit smoking about 31 years ago. Her smoking use included cigarettes. She started smoking about 51 years ago. She has a 20 pack-year smoking history. She has been exposed to tobacco smoke. She has never used smokeless tobacco. She reports current alcohol use of about 2.0 standard drinks of alcohol per week. She reports current drug use. Drug: Marijuana.    Review of Systems  Review of Systems   Constitutional: Negative.    Skin:  Positive for wound.                                  Objective    There were no vitals filed for this visit.  No LMP recorded. Patient is postmenopausal.    Physical Exam  Constitutional:       Appearance: Normal appearance.     Skin:     Comments: Debrided skin, erythema, no streaking, no fluctuance, no drainage     Neurological:      Mental Status: She is alert.         Procedures    Point of Care Test & Imaging Results from this visit  No results found for this visit on 07/21/25.   Imaging  No results found.    Cardiology, Vascular, and Other Imaging  No other imaging results found for the past 2 " days      Diagnostic study results (if any) were reviewed by Aramis Vasquez PA-C.    Assessment/Plan   Allergies, medications, history, and pertinent labs/EKGs/Imaging reviewed by Aramis Vasquez PA-C.     Medical Decision Making  Patient pleasant cooperative on examination.    On examination there is a wound to the finger.  The skin underneath is red but is most likely due to that being fracture/new her skin.  There is no significant streaking.  No fluctuance.  There is some bubbling starting into the palmar aspect.    I discussed possibly switching antibiotic to Doxy however patient cannot tolerate that or Keflex at this time.    Patient is only taken 1 day of the Bactrim.  Advised her to continue with the Bactrim as well as prescribing mupirocin ointment to use and monitor for worsening or persistent signs.    Patient in agreement with plan.  Patient alert and oriented, no acute distress upon discharge.    Orders and Diagnoses  There are no diagnoses linked to this encounter.    Medical Admin Record      Patient disposition: Home    Electronically signed by Aramis Vasquez PA-C  6:45 PM           [1] (Not in a hospital admission)  [2]   Past Medical History:  Diagnosis Date    Acute bronchitis due to other specified organisms 08/06/2019    Acute bacterial bronchitis    Atelectasis 06/24/2013    Atelectasis    Carpal tunnel syndrome, unspecified upper limb 06/24/2013    Carpal tunnel syndrome    Chest pain, unspecified 04/02/2013    Chest pain    Chronic obstructive pulmonary disease, unspecified     Chronic obstructive pulmonary disease    Cutaneous abscess of face 04/02/2013    Abscess of face    Diverticulosis     Enthesopathy, unspecified 04/02/2013    Tendonitis    Erysipelas 04/02/2013    Erysipelas    Gastro-esophageal reflux disease with esophagitis, without bleeding 08/14/2014    Gastro-esophageal reflux disease with esophagitis    GERD (gastroesophageal reflux disease)     Hard to intubate     Headache,  unspecified 04/02/2013    Headache    Larynx cancer (Multi)     surgery to remove only    Migraine, unspecified, not intractable, without status migrainosus 11/28/2018    Headache, migraine    Occipital neuralgia     Other conditions influencing health status 06/24/2013    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    Pain in leg, unspecified 06/24/2013    Lower extremity pain    Personal history of malignant neoplasm of unspecified site of lip, oral cavity, and pharynx 08/14/2014    History of malignant neoplasm of pharynx    Personal history of other diseases of the digestive system 04/26/2019    History of glossitis    Personal history of other specified conditions 06/09/2014    History of heartburn    Personal history of other specified conditions 06/12/2018    History of dyspnea    Personal history of pneumonia (recurrent) 01/23/2014    History of pneumonia    Sprain of joints and ligaments of unspecified parts of neck, initial encounter 04/02/2013    Neck sprain    Zoster without complications 04/02/2013    Herpes zoster   [3]   Past Surgical History:  Procedure Laterality Date    BREAST BIOPSY Right     benign    BUNIONECTOMY Left     CERVICAL DISCECTOMY  11/07/2013    Spinal Diskectomy Cervical    CERVICAL FUSION  09/12/2013    Cervical Vertebral Fusion    EYE SURGERY Bilateral 11/07/2013    Eye Surgery    KNEE ARTHROSCOPY W/ DEBRIDEMENT Right 11/07/2013    Arthroscopy Knee Right    OTHER SURGICAL HISTORY  08/16/2014    Throat Surgery to reove larynx cancer (several times)    OTHER SURGICAL HISTORY      occipital nerve stimulator to left upper chest    OTHER SURGICAL HISTORY      several cervical injections for pain    ROTATOR CUFF REPAIR Right 09/05/2013    Rotator Cuff Repair x2    SINUS SURGERY  11/07/2013    Sinus Surgery septoplasty x 2, nasal fracture x 2

## 2025-07-21 NOTE — PATIENT INSTRUCTIONS
Apply thin layer of bacitracin or Neosporin 2-3 times daily changing bandage each time.     Do not leave bandage on for greater than 24 hours    Watch for signs of infection this includes increasing redness, increasing pain, increasing swelling, pus or drainage from the wound. If you develope these symptoms I recommend you  follow-up for further evaluation and treatment.     Avoid aggressive scrubbing. If the area gets wet you may pat dry.

## 2025-07-30 DIAGNOSIS — Z13.6 ENCOUNTER FOR SCREENING FOR CORONARY ARTERY DISEASE: ICD-10-CM

## 2025-07-30 DIAGNOSIS — R73.9 HYPERGLYCEMIA: ICD-10-CM

## 2025-07-30 DIAGNOSIS — E78.2 MIXED HYPERLIPIDEMIA: Primary | ICD-10-CM

## 2025-07-31 LAB
ALBUMIN SERPL-MCNC: 4.3 G/DL (ref 3.6–5.1)
ALP SERPL-CCNC: 79 U/L (ref 37–153)
ALT SERPL-CCNC: 14 U/L (ref 6–29)
ANION GAP SERPL CALCULATED.4IONS-SCNC: 10 MMOL/L (CALC) (ref 7–17)
AST SERPL-CCNC: 14 U/L (ref 10–35)
BILIRUB SERPL-MCNC: 0.6 MG/DL (ref 0.2–1.2)
BUN SERPL-MCNC: 24 MG/DL (ref 7–25)
CALCIUM SERPL-MCNC: 9 MG/DL (ref 8.6–10.4)
CHLORIDE SERPL-SCNC: 104 MMOL/L (ref 98–110)
CHOLEST SERPL-MCNC: 222 MG/DL
CHOLEST/HDLC SERPL: 4.2 (CALC)
CO2 SERPL-SCNC: 22 MMOL/L (ref 20–32)
CREAT SERPL-MCNC: 0.92 MG/DL (ref 0.5–1.05)
EGFRCR SERPLBLD CKD-EPI 2021: 69 ML/MIN/1.73M2
ERYTHROCYTE [DISTWIDTH] IN BLOOD BY AUTOMATED COUNT: 12.5 % (ref 11–15)
EST. AVERAGE GLUCOSE BLD GHB EST-MCNC: 123 MG/DL
EST. AVERAGE GLUCOSE BLD GHB EST-SCNC: 6.8 MMOL/L
GLUCOSE SERPL-MCNC: 87 MG/DL (ref 65–99)
HBA1C MFR BLD: 5.9 %
HCT VFR BLD AUTO: 40.5 % (ref 35–45)
HDLC SERPL-MCNC: 53 MG/DL
HGB BLD-MCNC: 13.3 G/DL (ref 11.7–15.5)
LDLC SERPL CALC-MCNC: 143 MG/DL (CALC)
MCH RBC QN AUTO: 31.1 PG (ref 27–33)
MCHC RBC AUTO-ENTMCNC: 32.8 G/DL (ref 32–36)
MCV RBC AUTO: 94.6 FL (ref 80–100)
NONHDLC SERPL-MCNC: 169 MG/DL (CALC)
PLATELET # BLD AUTO: 315 THOUSAND/UL (ref 140–400)
PMV BLD REES-ECKER: 10.3 FL (ref 7.5–12.5)
POTASSIUM SERPL-SCNC: 4.4 MMOL/L (ref 3.5–5.3)
PROT SERPL-MCNC: 6.8 G/DL (ref 6.1–8.1)
RBC # BLD AUTO: 4.28 MILLION/UL (ref 3.8–5.1)
SODIUM SERPL-SCNC: 136 MMOL/L (ref 135–146)
TRIGL SERPL-MCNC: 134 MG/DL
WBC # BLD AUTO: 5.1 THOUSAND/UL (ref 3.8–10.8)

## 2025-07-31 NOTE — PROGRESS NOTES
"Subjective   Patient ID: Radha Puri \"Hilda\" is a 64 y.o. female who presents for Annual Exam.      HPI   Specialists seen by patient: Ortho  -GYN: Dr Rincon  -eye doctor  -dentist     Last pap/cervical cancer screening: a year ago  Last mammogram: 2022, maybe 2023. Dr Rincon puts that in  Hx of colon ca screening: Colonoscopy 2024.  Repeat in 7 years  Hx of DXA: Declines right now but will let me know  History of anxiety or depression: Yes, doing well on paxil   Immunizations: Prevnar 20 will get in fall with flu and covid  Diet: Follows a healthy diet, well rounded  Exercise: Gets regular exercise, swimming   Alcohol abuse screen:   Number of alcoholic drinks per week: 2  How many times in the past year 4 or more drinks in a day? several  Lung cancer screening:   Smoking history: ex-smoker and quit in 1994  Drug use: No  Depression screen yearly (phq-9): 0  Living will/healthcare power of :Yes - reviewed    Fasting blood work came back with an A1c of 5.9, stable from earlier this year.  Total cholesterol 222, HDL 53, , triglycerides 134.  Slightly higher than 6 months ago. Does try to follow a low carb diet. Trying to go more keto.    Patient also needs medication refills.  Needs refill of ambien. Takes it nightly. Has tried melatonin. Hasn't tried trazodone or amitriptyline.      She is also on the Lyrica for her chronic low back pain.  She takes cyclobenzaprine as needed  She is on 10 mg of Paxil as well.    OARRS:  Elizabeth Donahue DO on 8/1/2025  7:46 AM  I have personally reviewed the OARRS report for Radha Puri. I have considered the risks of abuse, dependence, addiction and diversion    Is the patient prescribed a combination of a benzodiazepine and opioid?  No    Last Urine Drug Screen / ordered today: Yes  Recent Results (from the past 8760 hours)   Confirmation Opiate/Opioid/Benzo Prescription Compliance    Collection Time: 08/14/24  7:50 AM   Result Value Ref Range    " Clonazepam <25 <25 ng/mL    7-Aminoclonazepam <25 <25 ng/mL    Alprazolam <25 <25 ng/mL    Alpha-Hydroxyalprazolam <25 <25 ng/mL    Midazolam <25 <25 ng/mL    Alpha-Hydroxymidazolam <25 <25 ng/mL    Chlordiazepoxide <25 <25 ng/mL    Diazepam <25 <25 ng/mL    Nordiazepam <25 <25 ng/mL    Temazepam <25 <25 ng/mL    Oxazepam <25 <25 ng/mL    Lorazepam <25 <25 ng/mL    Methadone <25 <25 ng/mL    EDDP <25 <25 ng/mL    6-Acetylmorphine <25 <25 ng/mL    Codeine <50 <50 ng/mL    Hydrocodone <25 <25 ng/mL    Hydromorphone <25 <25 ng/mL    Morphine  <50 <50 ng/mL    Norhydrocodone <25 <25 ng/mL    Noroxycodone <25 <25 ng/mL    Oxycodone <25 <25 ng/mL    Oxymorphone <25 <25 ng/mL    Fentanyl <2.5 <2.5 ng/mL    Norfentanyl <2.5 <2.5 ng/mL    Tramadol <50 <50 ng/mL    O-Desmethyltramadol <50 <50 ng/mL    Zolpidem <25 <25 ng/mL    Zolpidem Metabolite (ZCA) >1,000 (H) <25 ng/mL   Screen Opiate/Opioid/Benzo Prescription Compliance    Collection Time: 08/14/24  7:50 AM   Result Value Ref Range    Creatinine, Urine Random 94.3 20.0 - 320.0 mg/dL    Amphetamine Screen, Urine Presumptive Negative Presumptive Negative    Barbiturate Screen, Urine Presumptive Negative Presumptive Negative    Cannabinoid Screen, Urine Presumptive Negative Presumptive Negative    Cocaine Metabolite Screen, Urine Presumptive Negative Presumptive Negative    PCP Screen, Urine Presumptive Negative Presumptive Negative   Zolpidem Confirmation, Urine    Collection Time: 08/14/24  7:50 AM   Result Value Ref Range    Zolpidem <25 <25 ng/mL    Zolpidem Metabolite (ZCA) >1,000 (H) <25 ng/mL     Results are as expected.         Controlled Substance Agreement:  Date of the Last Agreement: 8/1/2025    Reviewed Controlled Substance Agreement including but not limited to the benefits, risks, and alternatives to treatment with a Controlled Substance medication(s).    Sleep Aids:   What is the patient's goal of therapy?  Improved sleep  Is this being achieved with current  "treatment?  Yes    Activities of Daily Living:   Is your overall impression that this patient is benefiting (symptom reduction outweighs side effects) from sleep aid therapy? Yes     1. Physical Functioning: Better  2. Family Relationship: Same  3. Social Relationship: Same  4. Mood: Same  5. Sleep Patterns: Better  6. Overall Function: Better       and Lyrica:  What is the patient's goal of therapy?  Decrease pain  Is this being achieved with current treatment?  Yes    Pain Assessment:  No data recorded    Activities of Daily Living:  Is your overall impression that this patient is benefiting (symptom reduction outweighs side effects) from Lyrica therapy? Yes     1. Physical Functioning: Better  2. Family Relationship: Same  3. Social Relationship: Better  4. Mood: Better  5. Sleep Patterns: Better  6. Overall Function: Better        Review of Systems   Constitutional:  Negative for chills, fatigue and fever.   Respiratory:  Negative for cough, shortness of breath and wheezing.    Cardiovascular:  Negative for chest pain, palpitations and leg swelling.   Gastrointestinal:  Negative for abdominal pain, constipation, diarrhea and nausea.   Genitourinary:  Negative for dysuria, frequency, hematuria and urgency.   Neurological:  Negative for dizziness, numbness and headaches.   Psychiatric/Behavioral:  Negative for dysphoric mood. The patient is not nervous/anxious.        Objective   /80 (BP Location: Left arm, Patient Position: Sitting, BP Cuff Size: Adult)   Pulse 81   Temp 36.4 °C (97.5 °F) (Temporal)   Ht 1.585 m (5' 2.4\")   Wt 57.1 kg (125 lb 12.8 oz)   SpO2 93%   BMI 22.71 kg/m²     Physical Exam  Constitutional:       General: She is not in acute distress.     Appearance: Normal appearance.   HENT:      Head: Normocephalic and atraumatic.      Right Ear: There is impacted cerumen.      Left Ear: Tympanic membrane and ear canal normal.      Mouth/Throat:      Mouth: Mucous membranes are moist.      " Pharynx: No posterior oropharyngeal erythema.     Eyes:      Extraocular Movements: Extraocular movements intact.      Pupils: Pupils are equal, round, and reactive to light.       Cardiovascular:      Rate and Rhythm: Normal rate and regular rhythm.      Heart sounds: No murmur heard.  Pulmonary:      Effort: Pulmonary effort is normal. No respiratory distress.      Breath sounds: Normal breath sounds. No wheezing.   Abdominal:      General: Bowel sounds are normal.      Palpations: Abdomen is soft.      Tenderness: There is no abdominal tenderness. There is no guarding.     Musculoskeletal:         General: Normal range of motion.      Cervical back: Neck supple.     Skin:     General: Skin is warm and dry.     Neurological:      General: No focal deficit present.      Mental Status: She is alert and oriented to person, place, and time.     Psychiatric:         Mood and Affect: Mood normal.         Behavior: Behavior normal.         Assessment/Plan   Problem List Items Addressed This Visit       Insomnia    Relevant Medications    zolpidem (Ambien) 5 mg tablet    Other Relevant Orders    Zolpidem Confirmation, Urine    Primary osteoarthritis of right hip    Relevant Medications    cyclobenzaprine (Flexeril) 10 mg tablet    Gastroesophageal reflux disease    Relevant Medications    cyclobenzaprine (Flexeril) 10 mg tablet    omeprazole (PriLOSEC) 20 mg DR capsule    Depression    Relevant Medications    zolpidem (Ambien) 5 mg tablet    pregabalin (Lyrica) 75 mg capsule    PARoxetine (Paxil) 10 mg tablet    Hyperlipidemia    Relevant Medications    atorvastatin (Lipitor) 20 mg tablet    Other Relevant Orders    Lipid Panel    Comprehensive Metabolic Panel    Asthma    Relevant Medications    fluticasone propion-salmeteroL (Advair Diskus) 500-50 mcg/dose diskus inhaler    Dry mouth    Relevant Medications    cevimeline (Evoxac) 30 mg capsule    Migraine-cluster headache syndrome    Relevant Medications    eletriptan  (Relpax) 40 mg tablet    Other Relevant Orders    Opiate/Opioid/Benzo Prescription Compliance     Other Visit Diagnoses         Health maintenance examination    -  Primary      Sciatica of right side        Relevant Medications    pregabalin (Lyrica) 75 mg capsule    Other Relevant Orders    Opiate/Opioid/Benzo Prescription Compliance      Medication management        Relevant Orders    Opiate/Opioid/Benzo Prescription Compliance    Zolpidem Confirmation, Urine      Allergy, subsequent encounter        Relevant Medications    montelukast (Singulair) 10 mg tablet      Depression screening                Fasting blood work reviewed.  Will start on a low-dose of atorvastatin and recheck blood work in 3 months.  We reviewed appropriate preventative health screening guidelines.  We discussed regular aerobic exercise. We discussed proper nutrition and weight control.    Will continue her on Ambien and Lyrica as they are working well for her.    Depression well-controlled, will continue Paxil    All medications refilled today    I personally have reviewed the OARRS report for this patient.  This report is scanned into the electronic medical record.  I have considered the risks of abuse, dependence, addiction and diversion.  I believe that it is clinically appropriate for the patient to be prescribed this medication.  Urine drug screen and drug contract either pending or up-to-date.    5-10 minutes were spent screening for depression using PHQ-2/PHQ-9 as documented in the chart           Patient understands and agrees with treatment plan    Elizabeth Donahue,    08/01/25

## 2025-08-01 ENCOUNTER — APPOINTMENT (OUTPATIENT)
Dept: PRIMARY CARE | Facility: CLINIC | Age: 65
End: 2025-08-01
Payer: COMMERCIAL

## 2025-08-01 VITALS
SYSTOLIC BLOOD PRESSURE: 120 MMHG | HEIGHT: 62 IN | DIASTOLIC BLOOD PRESSURE: 80 MMHG | BODY MASS INDEX: 23.15 KG/M2 | WEIGHT: 125.8 LBS | OXYGEN SATURATION: 93 % | HEART RATE: 81 BPM | TEMPERATURE: 97.5 F

## 2025-08-01 DIAGNOSIS — K21.9 GASTROESOPHAGEAL REFLUX DISEASE, UNSPECIFIED WHETHER ESOPHAGITIS PRESENT: ICD-10-CM

## 2025-08-01 DIAGNOSIS — M16.11 PRIMARY OSTEOARTHRITIS OF RIGHT HIP: ICD-10-CM

## 2025-08-01 DIAGNOSIS — F34.1 PERSISTENT DEPRESSIVE DISORDER: ICD-10-CM

## 2025-08-01 DIAGNOSIS — Z13.31 DEPRESSION SCREENING: ICD-10-CM

## 2025-08-01 DIAGNOSIS — E78.2 MIXED HYPERLIPIDEMIA: ICD-10-CM

## 2025-08-01 DIAGNOSIS — Z79.899 MEDICATION MANAGEMENT: ICD-10-CM

## 2025-08-01 DIAGNOSIS — G44.009 MIGRAINE-CLUSTER HEADACHE SYNDROME: ICD-10-CM

## 2025-08-01 DIAGNOSIS — M54.31 SCIATICA OF RIGHT SIDE: ICD-10-CM

## 2025-08-01 DIAGNOSIS — T78.40XD ALLERGY, SUBSEQUENT ENCOUNTER: ICD-10-CM

## 2025-08-01 DIAGNOSIS — R68.2 DRY MOUTH: ICD-10-CM

## 2025-08-01 DIAGNOSIS — J45.20 MILD INTERMITTENT ASTHMA WITHOUT COMPLICATION (HHS-HCC): ICD-10-CM

## 2025-08-01 DIAGNOSIS — G47.00 INSOMNIA, UNSPECIFIED TYPE: ICD-10-CM

## 2025-08-01 DIAGNOSIS — Z00.00 HEALTH MAINTENANCE EXAMINATION: Primary | ICD-10-CM

## 2025-08-01 PROCEDURE — 1160F RVW MEDS BY RX/DR IN RCRD: CPT | Performed by: STUDENT IN AN ORGANIZED HEALTH CARE EDUCATION/TRAINING PROGRAM

## 2025-08-01 PROCEDURE — 1158F ADVNC CARE PLAN TLK DOCD: CPT | Performed by: STUDENT IN AN ORGANIZED HEALTH CARE EDUCATION/TRAINING PROGRAM

## 2025-08-01 PROCEDURE — 99397 PER PM REEVAL EST PAT 65+ YR: CPT | Performed by: STUDENT IN AN ORGANIZED HEALTH CARE EDUCATION/TRAINING PROGRAM

## 2025-08-01 PROCEDURE — 1159F MED LIST DOCD IN RCRD: CPT | Performed by: STUDENT IN AN ORGANIZED HEALTH CARE EDUCATION/TRAINING PROGRAM

## 2025-08-01 PROCEDURE — 3008F BODY MASS INDEX DOCD: CPT | Performed by: STUDENT IN AN ORGANIZED HEALTH CARE EDUCATION/TRAINING PROGRAM

## 2025-08-01 PROCEDURE — 96127 BRIEF EMOTIONAL/BEHAV ASSMT: CPT | Performed by: STUDENT IN AN ORGANIZED HEALTH CARE EDUCATION/TRAINING PROGRAM

## 2025-08-01 PROCEDURE — 99214 OFFICE O/P EST MOD 30 MIN: CPT | Performed by: STUDENT IN AN ORGANIZED HEALTH CARE EDUCATION/TRAINING PROGRAM

## 2025-08-01 RX ORDER — PREGABALIN 75 MG/1
75 CAPSULE ORAL 2 TIMES DAILY
Qty: 180 CAPSULE | Refills: 1 | Status: SHIPPED | OUTPATIENT
Start: 2025-08-01 | End: 2026-01-28

## 2025-08-01 RX ORDER — CYCLOBENZAPRINE HCL 10 MG
10 TABLET ORAL 3 TIMES DAILY PRN
Qty: 90 TABLET | Refills: 1 | Status: SHIPPED | OUTPATIENT
Start: 2025-08-01

## 2025-08-01 RX ORDER — CEVIMELINE HYDROCHLORIDE 30 MG/1
30 CAPSULE ORAL 3 TIMES DAILY
Qty: 270 CAPSULE | Refills: 3 | Status: SHIPPED | OUTPATIENT
Start: 2025-08-01

## 2025-08-01 RX ORDER — ATORVASTATIN CALCIUM 20 MG/1
20 TABLET, FILM COATED ORAL DAILY
Qty: 90 TABLET | Refills: 1 | Status: SHIPPED | OUTPATIENT
Start: 2025-08-01 | End: 2026-01-28

## 2025-08-01 RX ORDER — PAROXETINE 10 MG/1
10 TABLET, FILM COATED ORAL EVERY MORNING
Qty: 90 TABLET | Refills: 1 | Status: SHIPPED | OUTPATIENT
Start: 2025-08-01

## 2025-08-01 RX ORDER — ELETRIPTAN HYDROBROMIDE 40 MG/1
40 TABLET, FILM COATED ORAL ONCE AS NEEDED
Qty: 18 TABLET | Refills: 3 | Status: SHIPPED | OUTPATIENT
Start: 2025-08-01

## 2025-08-01 RX ORDER — ZOLPIDEM TARTRATE 5 MG/1
5 TABLET ORAL NIGHTLY PRN
Qty: 30 TABLET | Refills: 5 | Status: SHIPPED | OUTPATIENT
Start: 2025-08-01

## 2025-08-01 RX ORDER — MONTELUKAST SODIUM 10 MG/1
10 TABLET ORAL NIGHTLY
Qty: 90 TABLET | Refills: 3 | Status: SHIPPED | OUTPATIENT
Start: 2025-08-01

## 2025-08-01 RX ORDER — FLUTICASONE PROPIONATE AND SALMETEROL 500; 50 UG/1; UG/1
1 POWDER RESPIRATORY (INHALATION)
Qty: 60 EACH | Refills: 3 | Status: SHIPPED | OUTPATIENT
Start: 2025-08-01

## 2025-08-01 RX ORDER — OMEPRAZOLE 20 MG/1
20 CAPSULE, DELAYED RELEASE ORAL
Qty: 90 CAPSULE | Refills: 3 | Status: SHIPPED | OUTPATIENT
Start: 2025-08-01

## 2025-08-01 ASSESSMENT — ENCOUNTER SYMPTOMS
WHEEZING: 0
FEVER: 0
DEPRESSION: 0
PALPITATIONS: 0
FREQUENCY: 0
FATIGUE: 0
NERVOUS/ANXIOUS: 0
HEADACHES: 0
DYSPHORIC MOOD: 0
DIARRHEA: 0
CONSTIPATION: 0
HEMATURIA: 0
DIZZINESS: 0
OCCASIONAL FEELINGS OF UNSTEADINESS: 1
SHORTNESS OF BREATH: 0
NAUSEA: 0
NUMBNESS: 0
ABDOMINAL PAIN: 0
COUGH: 0
CHILLS: 0
DYSURIA: 0

## 2025-08-01 ASSESSMENT — ANXIETY QUESTIONNAIRES
2. NOT BEING ABLE TO STOP OR CONTROL WORRYING: NOT AT ALL
3. WORRYING TOO MUCH ABOUT DIFFERENT THINGS: NOT AT ALL
GAD7 TOTAL SCORE: 0
1. FEELING NERVOUS, ANXIOUS, OR ON EDGE: NOT AT ALL
7. FEELING AFRAID AS IF SOMETHING AWFUL MIGHT HAPPEN: NOT AT ALL
5. BEING SO RESTLESS THAT IT IS HARD TO SIT STILL: NOT AT ALL
6. BECOMING EASILY ANNOYED OR IRRITABLE: NOT AT ALL
4. TROUBLE RELAXING: NOT AT ALL

## 2025-08-01 ASSESSMENT — PATIENT HEALTH QUESTIONNAIRE - PHQ9
1. LITTLE INTEREST OR PLEASURE IN DOING THINGS: NOT AT ALL
SUM OF ALL RESPONSES TO PHQ9 QUESTIONS 1 AND 2: 0
2. FEELING DOWN, DEPRESSED OR HOPELESS: NOT AT ALL

## 2025-08-03 LAB
1OH-MIDAZOLAM UR-MCNC: NEGATIVE NG/ML
7AMINOCLONAZEPAM UR-MCNC: NEGATIVE NG/ML
A-OH ALPRAZ UR-MCNC: NEGATIVE NG/ML
A-OH-TRIAZOLAM UR-MCNC: NEGATIVE NG/ML
AMOBARBITAL UR-MCNC: ABNORMAL NG/ML
AMPHET UR-MCNC: ABNORMAL NG/ML
AMPHETAMINES UR QL: ABNORMAL
BARBITURATES UR QL: ABNORMAL
BUTALBITAL UR-MCNC: ABNORMAL NG/ML
BZE UR QL: ABNORMAL
BZE UR-MCNC: ABNORMAL MG/L
CODEINE UR-MCNC: NEGATIVE NG/ML
CREAT UR-MCNC: ABNORMAL MG/DL
DRUG SCREEN COMMENT UR-IMP: ABNORMAL
EDDP UR-MCNC: ABNORMAL NG/ML
FENTANYL UR-MCNC: ABNORMAL NG/ML
HYDROCODONE UR-MCNC: NEGATIVE NG/ML
HYDROMORPHONE UR-MCNC: NEGATIVE NG/ML
LORAZEPAM UR-MCNC: NEGATIVE NG/ML
METHADONE UR-MCNC: ABNORMAL UG/L
METHAMPHET UR-MCNC: ABNORMAL UG/ML
MORPHINE UR-MCNC: NEGATIVE NG/ML
NORDIAZEPAM UR-MCNC: NEGATIVE NG/ML
NORFENTANYL UR-MCNC: ABNORMAL NG/ML
NORHYDROCODONE UR CFM-MCNC: NEGATIVE NG/ML
NOROXYCODONE UR CFM-MCNC: NEGATIVE NG/ML
NORTRAMADOL UR-MCNC: NEGATIVE NG/ML
OH-ETHYLFLURAZ UR-MCNC: NEGATIVE NG/ML
OXAZEPAM UR-MCNC: 110 NG/ML
OXIDANTS UR QL: ABNORMAL
OXYCODONE UR CFM-MCNC: NEGATIVE NG/ML
OXYMORPHONE UR CFM-MCNC: NEGATIVE NG/ML
PCP UR QL: ABNORMAL
PCP UR-MCNC: ABNORMAL UG/L
PENTOBARB UR-MCNC: ABNORMAL UG/ML
PH UR: ABNORMAL [PH]
PHENOBARB UR-MCNC: ABNORMAL UG/ML
QUEST 6 ACETYLMORPHINE: ABNORMAL
QUEST ABNORMAL SPECIMEN VALIDITY TEST:: ABNORMAL
QUEST NOTES AND COMMENTS: ABNORMAL
QUEST PATIENT HISTORICAL REPORT: ABNORMAL
QUEST ZOLPIDEM: ABNORMAL
SECOBARBITAL UR-MCNC: ABNORMAL UG/ML
SP GR UR: ABNORMAL
TEMAZEPAM UR-MCNC: 88 NG/ML
THC UR QL: ABNORMAL
THC UR-MCNC: ABNORMAL NG/ML
TRAMADOL UR-MCNC: NEGATIVE NG/ML
ZOLPIDEM PHENYL-4-CARB UR CFM-MCNC: ABNORMAL NG/ML

## 2025-08-05 DIAGNOSIS — Z12.31 ENCOUNTER FOR SCREENING MAMMOGRAM FOR BREAST CANCER: ICD-10-CM

## 2025-08-06 ENCOUNTER — APPOINTMENT (OUTPATIENT)
Dept: ORTHOPEDIC SURGERY | Facility: CLINIC | Age: 65
End: 2025-08-06
Payer: COMMERCIAL

## 2025-08-06 VITALS — WEIGHT: 125 LBS | HEIGHT: 63 IN | BODY MASS INDEX: 22.15 KG/M2

## 2025-08-06 DIAGNOSIS — M19.031 CMC DJD(CARPOMETACARPAL DEGENERATIVE JOINT DISEASE), LOCALIZED PRIMARY, RIGHT: Primary | ICD-10-CM

## 2025-08-06 LAB
1OH-MIDAZOLAM UR-MCNC: NEGATIVE NG/ML
7AMINOCLONAZEPAM UR-MCNC: NEGATIVE NG/ML
A-OH ALPRAZ UR-MCNC: NEGATIVE NG/ML
A-OH-TRIAZOLAM UR-MCNC: NEGATIVE NG/ML
AMPHETAMINES UR QL: NEGATIVE NG/ML
BARBITURATES UR QL: NEGATIVE NG/ML
BZE UR QL: NEGATIVE NG/ML
CODEINE UR-MCNC: NEGATIVE NG/ML
CREAT UR-MCNC: 196.7 MG/DL
DRUG SCREEN COMMENT UR-IMP: ABNORMAL
EDDP UR-MCNC: NEGATIVE NG/ML
FENTANYL UR-MCNC: NEGATIVE NG/ML
HYDROCODONE UR-MCNC: NEGATIVE NG/ML
HYDROMORPHONE UR-MCNC: NEGATIVE NG/ML
LORAZEPAM UR-MCNC: NEGATIVE NG/ML
METHADONE UR-MCNC: NEGATIVE NG/ML
MORPHINE UR-MCNC: NEGATIVE NG/ML
NORDIAZEPAM UR-MCNC: NEGATIVE NG/ML
NORFENTANYL UR-MCNC: NEGATIVE NG/ML
NORHYDROCODONE UR CFM-MCNC: NEGATIVE NG/ML
NOROXYCODONE UR CFM-MCNC: NEGATIVE NG/ML
NORTRAMADOL UR-MCNC: NEGATIVE NG/ML
OH-ETHYLFLURAZ UR-MCNC: NEGATIVE NG/ML
OXAZEPAM UR-MCNC: 110 NG/ML
OXIDANTS UR QL: NEGATIVE MCG/ML
OXYCODONE UR CFM-MCNC: NEGATIVE NG/ML
OXYMORPHONE UR CFM-MCNC: NEGATIVE NG/ML
PCP UR QL: NEGATIVE NG/ML
PH UR: 6.2 [PH] (ref 4.5–9)
QUEST 6 ACETYLMORPHINE: NEGATIVE NG/ML
QUEST NOTES AND COMMENTS: ABNORMAL
QUEST ZOLPIDEM: 8 NG/ML
TEMAZEPAM UR-MCNC: 88 NG/ML
THC UR QL: POSITIVE NG/ML
THC UR-MCNC: 30 NG/ML
TRAMADOL UR-MCNC: NEGATIVE NG/ML
ZOLPIDEM PHENYL-4-CARB UR CFM-MCNC: >2500 NG/ML

## 2025-08-06 PROCEDURE — 1160F RVW MEDS BY RX/DR IN RCRD: CPT | Performed by: ORTHOPAEDIC SURGERY

## 2025-08-06 PROCEDURE — 99213 OFFICE O/P EST LOW 20 MIN: CPT | Performed by: ORTHOPAEDIC SURGERY

## 2025-08-06 PROCEDURE — 1159F MED LIST DOCD IN RCRD: CPT | Performed by: ORTHOPAEDIC SURGERY

## 2025-08-06 PROCEDURE — 3008F BODY MASS INDEX DOCD: CPT | Performed by: ORTHOPAEDIC SURGERY

## 2025-08-06 ASSESSMENT — ENCOUNTER SYMPTOMS
LOSS OF SENSATION IN FEET: 0
DEPRESSION: 0
OCCASIONAL FEELINGS OF UNSTEADINESS: 0

## 2025-08-06 ASSESSMENT — PAIN - FUNCTIONAL ASSESSMENT: PAIN_FUNCTIONAL_ASSESSMENT: NO/DENIES PAIN

## 2025-08-06 NOTE — PROGRESS NOTES
3 months postop s/p right thumb carpal metacarpal arthroplasty and left thumb CMC CSI done 5/6/2025. They deny pain. Reports some numbness/tingling around the 1st MC. They are still in occupational therapy at [facility] in [place], [place] and are doing exercises on their own. Overall, they are doing fine. They have no concerns. Their left thumb is doing fine right now.

## 2025-08-06 NOTE — PROGRESS NOTES
64 y.o. female presents today for for follow up after right thumb CMC arthroplasty with left thumb steroid injection CMC. The patient reports doing well, symptoms much improved. The DOS was 12 weeks ago. Pain is controlled. Patient denies numbness and tingling.  Splint has been worn as directed.   Been going to therapy, symptoms much improved.  Did get a staph infection in her right ring finger which is resolving now.  Overall doing very well.    Review of Systems    Constitutional: see HPI, no fever, no chills, not feeling tired, no significant weight gain or weight loss.   Eyes: No vision changes  ENT: no nosebleeds.   Cardiovascular: no chest pain.   Respiratory: no shortness of breath and no cough.   Gastrointestinal: no abdominal pain, no nausea, no vomiting and no diarrhea.   Musculoskeletal: per HPI  Neurological: no headache, no gait disturbances  Psychiatric: no depression and no sleep disturbances.   Endocrine: no muscle weakness and no muscle cramps.   Hematologic/Lymphatic: no swollen glands and no tendency for easy bruising or excessive swelling.     Patient's past medical history, past surgical history, allergies, and medications have been reviewed unless otherwise noted in the chart.     Hand/Wrist Post-Op Exam  Inspection:  no evidence of infection, no erythema, Palpation:  compartments are soft, Range of Motion:  F/E 80/80, S/P 90/90 Finger ROM Full extension, full flexion Stability:  stable Strength:  5/5 F/E, 5/5 Adduction/Abduction 5/5 Opposition to small finger Skin:  incision site clean, dry and intact, healing without complication, Vascular:  capillary refill <2 seconds distally, Sensation:  intact to light touch distally.    Constitutional   General appearance: Alert and in no acute distress. Well developed, well nourished.    Eyes   External Eye, Conjunctiva and lids: Normal external exam - pupils were equal in size, round, reactive to light (PERRL) with normal accommodation and extraocular  movements intact (EOMI).   Ears, Nose, Mouth, and Throat   Hearing: Normal.   Neck   Neck: No neck mass was observed. Supple.   Pulmonary   Respiratory effort: No respiratory distress.   Cardiovascular   Examination of extremities: No peripheral edema.   Psychiatric   Judgment and insight: Intact.   Orientation to person, place, and time: Alert and oriented x 3.       Mood and affect: Normal.      12 weeks status post right thumb CMC arthroplasty and left thumb CMC steroid injection  Based on the history, physical exam and imaging studies above, the patient's presentation is consistent with the above diagnosis.  I had a long discussion with the patient regarding their presentation and the treatment options.    We again discussed her treatment options going forward along with their associated risks and benefits. After thorough discussion, the patient has elected to proceed with postoperative care. All questions were answered to the patients satisfaction who seems satisfied with the plan.  They will call the office with any questions/concerns.     Vitamin E cream prn to scar tissue  Occupational Therapy evaluation and treatment prn  Follow-up 6 weeks prn no x-ray

## 2025-08-13 DIAGNOSIS — M54.31 SCIATICA OF RIGHT SIDE: ICD-10-CM

## 2025-08-13 RX ORDER — IBUPROFEN 800 MG/1
800 TABLET, FILM COATED ORAL 3 TIMES DAILY PRN
Qty: 90 TABLET | Refills: 0 | Status: SHIPPED | OUTPATIENT
Start: 2025-08-13 | End: 2026-08-13

## 2025-08-14 DIAGNOSIS — M54.31 SCIATICA OF RIGHT SIDE: ICD-10-CM

## 2025-08-14 DIAGNOSIS — Z79.899 MEDICATION MANAGEMENT: ICD-10-CM

## 2025-08-14 DIAGNOSIS — G89.29 OTHER CHRONIC PAIN: ICD-10-CM

## 2025-09-06 LAB
AMPHETAMINES UR QL: NEGATIVE NG/ML
BARBITURATES UR QL: NEGATIVE NG/ML
BENZODIAZ UR QL: NEGATIVE NG/ML
BZE UR QL: NEGATIVE NG/ML
CREAT UR-MCNC: 52.3 MG/DL
FENTANYL UR QL SCN: NEGATIVE NG/ML
METHADONE UR QL: NEGATIVE NG/ML
OPIATES UR QL: NEGATIVE NG/ML
OXIDANTS UR QL: NEGATIVE MCG/ML
OXYCODONE UR QL: NEGATIVE NG/ML
PCP UR QL: NEGATIVE NG/ML
PH UR: 6 [PH] (ref 4.5–9)
QUEST NOTES AND COMMENTS: NORMAL
THC UR QL: NEGATIVE NG/ML

## 2026-01-22 ENCOUNTER — APPOINTMENT (OUTPATIENT)
Dept: PRIMARY CARE | Facility: CLINIC | Age: 66
End: 2026-01-22
Payer: COMMERCIAL

## (undated) DEVICE — DRAPE, SHEET, U, STERI DRAPE, 47 X 51 IN, DISPOSABLE, STERILE

## (undated) DEVICE — GLOVE, SURGICAL, PROTEXIS PI BLUE W/NEUTHERA, 8.0, PF, LF

## (undated) DEVICE — SOLUTION, IRRIGATION, X RX SODIUM CHL 0.9%, 1000ML BTL

## (undated) DEVICE — SOLUTION, POVIDONE IODINE 10%, 0.75 OZ, NS

## (undated) DEVICE — SUTURE, SUPRAMID EXTRA, 3-0, 18IN 1/2 CIR

## (undated) DEVICE — Device

## (undated) DEVICE — PADDING, CAST, SOFT, 2 X 4YDS

## (undated) DEVICE — RETRIEVER, SUTURE, HEWSON

## (undated) DEVICE — SUTURE, VICRYL, 4-0, 18 IN, UNDYED BR PS-2

## (undated) DEVICE — DRAPE, INSTRUMENT, W/POUCH, STERI DRAPE, 7 X 11 IN, DISPOSABLE, STERILE

## (undated) DEVICE — BANDAGE, COFLEX, 6 X 5 YDS, TAN, STERILE, LF

## (undated) DEVICE — SPLINT SYSTEM, ORTHO GLASS, 4 IN X 15 FT, SYNTHETIC

## (undated) DEVICE — SOLUTION, IRRIGATION, SODIUM CHLORIDE 0.9%, 1000 ML, POUR BOTTLE

## (undated) DEVICE — SOLUTION, INJECTION, SODIUM CHLORIDE 9%, 500ML

## (undated) DEVICE — ELECTRODE, ELECTROSURGICAL, BLADE, EXTENDED

## (undated) DEVICE — CORD, CAUTERY, BIOPOLAR FORCEP, 12FT

## (undated) DEVICE — CUFF, TOURNIQUET, 18 X 4, DUAL PORT/SNGL BLADDER, DISP, LF

## (undated) DEVICE — SUTURE, VICRYL, 2-0, 18 IN CP-2, UNDYED

## (undated) DEVICE — TOWEL PACK, STERILE, 4/PACK, BLUE

## (undated) DEVICE — COVER HANDLE LIGHT, STERIS, BLUE, STERILE

## (undated) DEVICE — SOLUTION, INJECTION, USP, LACTATED RINGERS, LIFECARE, 1000ML

## (undated) DEVICE — GLOVE, PROTEXIS PI CLASSIC, SZ-8.0, PF, PF, LF

## (undated) DEVICE — SYRINGE, 50 CC, LUER LOCK

## (undated) DEVICE — CATHETER, SUCTION, CATH-N-GLOVE, PEEL POUCH, 18 FR

## (undated) DEVICE — BUR, ROUND, 4MM, 12 FLUTES CUTTING

## (undated) DEVICE — BLADE, SAW, OSC TIP, FALCON, 19.5 X 1.28 X 90MM

## (undated) DEVICE — SUTURE, VICRYL, 1, 36 IN, CT-1, UNDYED

## (undated) DEVICE — CATHETER TRAY, SURESTEP, 14FR, PRECONNECTED DRAIN BAG, PVC

## (undated) DEVICE — NEEDLE, HYPODERMIC, REGULAR WALL, REGULAR BEVEL, 18 G X 1.5 IN

## (undated) DEVICE — DRAPE, C-ARM, FLUROSCAN, MINI

## (undated) DEVICE — TRACKING KIT, HIP PROCEDURES, VIZADISC

## (undated) DEVICE — HOOD, SURGICAL, FLYTE HYBRID

## (undated) DEVICE — DRAPE, INCISE, ANTIMICROBIAL, IOBAN 2, STERI DRAPE, 23 X 33 IN, DISPOSABLE, STERILE

## (undated) DEVICE — SUTURE, ETHILON, 4-0, BLK, MONO, PS-2 18

## (undated) DEVICE — GLOVE, SURGICAL, PROTEXIS PI , 7.5, PF, LF

## (undated) DEVICE — SYRINGE, 10 CC, LUER LOCK

## (undated) DEVICE — DRESSING, GAUZE, PETROLATUM, STRIP, XEROFORM, 1 X 8 IN, STERILE

## (undated) DEVICE — STRYKER RIO DRAPE KIT (FORMERLY MFR'D BY MAKO)

## (undated) DEVICE — NEEDLE, HYPODERMIC, REGULAR WALL, REGULAR BEVEL, 25 G X 1.5 IN

## (undated) DEVICE — BLADE, OSCILLATING/SAGITTAL, 31MM X 9MM

## (undated) DEVICE — TUBING, IRRIGATION, HIGH FLOW, HAND PIECE SET

## (undated) DEVICE — APPLICATOR, CHLORAPREP, W/ORANGE TINT, 26ML

## (undated) DEVICE — KIT, UPPER EXTREMITY, CUSTOM, PORTAGE

## (undated) DEVICE — DRAPE, SHEET, 17 X 23 IN

## (undated) DEVICE — BANDAGE, ELASTIC, PREMIUM, SELF-CLOSE, 2 IN X 5 YD, STERILE